# Patient Record
Sex: MALE | Race: WHITE | NOT HISPANIC OR LATINO | Employment: OTHER | URBAN - METROPOLITAN AREA
[De-identification: names, ages, dates, MRNs, and addresses within clinical notes are randomized per-mention and may not be internally consistent; named-entity substitution may affect disease eponyms.]

---

## 2017-01-01 ENCOUNTER — GENERIC CONVERSION - ENCOUNTER (OUTPATIENT)
Dept: OTHER | Facility: OTHER | Age: 82
End: 2017-01-01

## 2017-01-01 ENCOUNTER — ALLSCRIPTS OFFICE VISIT (OUTPATIENT)
Dept: OTHER | Facility: OTHER | Age: 82
End: 2017-01-01

## 2017-01-01 ENCOUNTER — APPOINTMENT (OUTPATIENT)
Dept: PHYSICAL THERAPY | Facility: REHABILITATION | Age: 82
End: 2017-01-01
Payer: COMMERCIAL

## 2017-01-01 ENCOUNTER — HOSPITAL ENCOUNTER (OUTPATIENT)
Dept: ULTRASOUND IMAGING | Facility: MEDICAL CENTER | Age: 82
Discharge: HOME | End: 2017-12-21
Payer: COMMERCIAL

## 2017-01-01 ENCOUNTER — LAB CONVERSION - ENCOUNTER (OUTPATIENT)
Dept: OTHER | Facility: OTHER | Age: 82
End: 2017-01-01

## 2017-01-01 ENCOUNTER — GENERIC CONVERSION - ENCOUNTER (OUTPATIENT)
Dept: FAMILY MEDICINE CLINIC | Facility: CLINIC | Age: 82
End: 2017-01-01

## 2017-01-01 ENCOUNTER — TRANSCRIBE ORDERS (OUTPATIENT)
Dept: ADMINISTRATIVE | Facility: HOSPITAL | Age: 82
End: 2017-01-01

## 2017-01-01 DIAGNOSIS — R33.9 INCOMPLETE BLADDER EMPTYING: ICD-10-CM

## 2017-01-01 DIAGNOSIS — I83.009 VARICOSE VEINS OF LOWER EXTREMITY WITH ULCER (HCC): ICD-10-CM

## 2017-01-01 DIAGNOSIS — G62.9 POLYNEUROPATHY: ICD-10-CM

## 2017-01-01 DIAGNOSIS — N52.9 MALE ERECTILE DYSFUNCTION: ICD-10-CM

## 2017-01-01 DIAGNOSIS — R33.9 RETENTION OF URINE: ICD-10-CM

## 2017-01-01 DIAGNOSIS — N40.0 BENIGN PROSTATIC HYPERPLASIA WITHOUT LOWER URINARY TRACT SYMPTOMS: ICD-10-CM

## 2017-01-01 DIAGNOSIS — R33.9 INCOMPLETE BLADDER EMPTYING: Primary | ICD-10-CM

## 2017-01-01 DIAGNOSIS — I73.9 PERIPHERAL VASCULAR DISEASE (HCC): ICD-10-CM

## 2017-01-01 DIAGNOSIS — R26.89 OTHER ABNORMALITIES OF GAIT AND MOBILITY: ICD-10-CM

## 2017-01-01 DIAGNOSIS — R31.9 HEMATURIA: ICD-10-CM

## 2017-01-01 DIAGNOSIS — R31.0 GROSS HEMATURIA: ICD-10-CM

## 2017-01-01 DIAGNOSIS — R73.9 HYPERGLYCEMIA: ICD-10-CM

## 2017-01-01 DIAGNOSIS — Z12.5 ENCOUNTER FOR SCREENING FOR MALIGNANT NEOPLASM OF PROSTATE: ICD-10-CM

## 2017-01-01 DIAGNOSIS — L97.909 VARICOSE VEINS OF LOWER EXTREMITY WITH ULCER (HCC): ICD-10-CM

## 2017-01-01 DIAGNOSIS — D03.9 MELANOMA IN SITU (HCC): ICD-10-CM

## 2017-01-01 LAB
A/G RATIO (HISTORICAL): 1.6 (CALC) (ref 1–2.5)
ALBUMIN SERPL BCP-MCNC: 4.4 G/DL (ref 3.6–5.1)
ALP SERPL-CCNC: 67 U/L (ref 40–115)
ALT SERPL W P-5'-P-CCNC: 20 U/L (ref 9–46)
AST SERPL W P-5'-P-CCNC: 25 U/L (ref 10–35)
BASOPHILS # BLD AUTO: 0.3 %
BASOPHILS # BLD AUTO: 11 CELLS/UL (ref 0–200)
BILIRUB SERPL-MCNC: 1 MG/DL (ref 0.2–1.2)
BUN SERPL-MCNC: 12 MG/DL (ref 7–25)
BUN/CREA RATIO (HISTORICAL): NORMAL (CALC) (ref 6–22)
CALCIUM SERPL-MCNC: 9.7 MG/DL (ref 8.6–10.3)
CHLORIDE SERPL-SCNC: 104 MMOL/L (ref 98–110)
CHOLEST SERPL-MCNC: 154 MG/DL (ref 125–200)
CHOLEST SERPL-MCNC: 174 MG/DL
CHOLEST/HDLC SERPL: 4.1 (CALC)
CHOLEST/HDLC SERPL: 5 (CALC)
CLARITY UR: NORMAL
CO2 SERPL-SCNC: 26 MMOL/L (ref 20–31)
COLOR UR: YELLOW
CREAT SERPL-MCNC: 0.99 MG/DL (ref 0.7–1.11)
DEPRECATED RDW RBC AUTO: 13.1 % (ref 11–15)
EGFR AFRICAN AMERICAN (HISTORICAL): 80 ML/MIN/1.73M2
EGFR-AMERICAN CALC (HISTORICAL): 69 ML/MIN/1.73M2
EOSINOPHIL # BLD AUTO: 148 CELLS/UL (ref 15–500)
EOSINOPHIL # BLD AUTO: 3.9 %
GAMMA GLOBULIN (HISTORICAL): 2.7 G/DL (CALC) (ref 1.9–3.7)
GLUCOSE (HISTORICAL): 95 MG/DL (ref 65–99)
GLUCOSE (HISTORICAL): NORMAL
HBA1C MFR BLD HPLC: 5.4 % OF TOTAL HGB
HBA1C MFR BLD HPLC: 5.5 % OF TOTAL HGB
HCT VFR BLD AUTO: 45.8 % (ref 38.5–50)
HDLC SERPL-MCNC: 35 MG/DL
HDLC SERPL-MCNC: 38 MG/DL
HGB BLD-MCNC: 15.5 G/DL (ref 13.2–17.1)
HGB UR QL STRIP.AUTO: NORMAL
KETONES UR STRIP-MCNC: NORMAL MG/DL
LDL CHOLESTEROL (HISTORICAL): 102 MG/DL (CALC)
LDL CHOLESTEROL (HISTORICAL): 117 MG/DL (CALC)
LEUKOCYTE ESTERASE UR QL STRIP: NORMAL
LYMPHOCYTES # BLD AUTO: 1554 CELLS/UL (ref 850–3900)
LYMPHOCYTES # BLD AUTO: 40.9 %
MCH RBC QN AUTO: 31.8 PG (ref 27–33)
MCHC RBC AUTO-ENTMCNC: 33.8 G/DL (ref 32–36)
MCV RBC AUTO: 93.9 FL (ref 80–100)
MONOCYTES # BLD AUTO: 426 CELLS/UL (ref 200–950)
MONOCYTES (HISTORICAL): 11.2 %
NEUTROPHILS # BLD AUTO: 1661 CELLS/UL (ref 1500–7800)
NEUTROPHILS # BLD AUTO: 43.7 %
NITRITE UR QL STRIP: NORMAL
NON-HDL-CHOL (CHOL-HDL) (HISTORICAL): 116 MG/DL (CALC)
NON-HDL-CHOL (CHOL-HDL) (HISTORICAL): 139 MG/DL (CALC)
PH UR STRIP.AUTO: 6 [PH]
PLATELET # BLD AUTO: 154 THOUSAND/UL (ref 140–400)
PMV BLD AUTO: 12 FL (ref 7.5–12.5)
POTASSIUM SERPL-SCNC: 4.3 MMOL/L (ref 3.5–5.3)
PROSTATE SPECIFIC ANTIGEN TOTAL (HISTORICAL): 1.9 NG/ML
PROT UR STRIP-MCNC: NORMAL MG/DL
RBC # BLD AUTO: 4.88 MILLION/UL (ref 4.2–5.8)
SODIUM SERPL-SCNC: 139 MMOL/L (ref 135–146)
SP GR UR STRIP.AUTO: 1.01
TOTAL PROTEIN (HISTORICAL): 7.1 G/DL (ref 6.1–8.1)
TRIGL SERPL-MCNC: 113 MG/DL
TRIGL SERPL-MCNC: 70 MG/DL
TSH SERPL DL<=0.05 MIU/L-ACNC: 1.73 MIU/L (ref 0.4–4.5)
TSH SERPL DL<=0.05 MIU/L-ACNC: 2.34 MIU/L (ref 0.4–4.5)
WBC # BLD AUTO: 3.8 THOUSAND/UL (ref 3.8–10.8)

## 2017-01-01 PROCEDURE — 97530 THERAPEUTIC ACTIVITIES: CPT

## 2017-01-01 PROCEDURE — 97110 THERAPEUTIC EXERCISES: CPT

## 2017-01-01 PROCEDURE — G8990 OTHER PT/OT CURRENT STATUS: HCPCS

## 2017-01-01 PROCEDURE — 97112 NEUROMUSCULAR REEDUCATION: CPT

## 2017-01-01 PROCEDURE — G8991 OTHER PT/OT GOAL STATUS: HCPCS

## 2017-01-01 PROCEDURE — 97140 MANUAL THERAPY 1/> REGIONS: CPT

## 2017-01-01 PROCEDURE — 51798 US URINE CAPACITY MEASURE: CPT

## 2017-01-01 PROCEDURE — 97161 PT EVAL LOW COMPLEX 20 MIN: CPT

## 2017-01-05 ENCOUNTER — GENERIC CONVERSION - ENCOUNTER (OUTPATIENT)
Dept: OTHER | Facility: OTHER | Age: 82
End: 2017-01-05

## 2017-01-24 ENCOUNTER — GENERIC CONVERSION - ENCOUNTER (OUTPATIENT)
Dept: OTHER | Facility: OTHER | Age: 82
End: 2017-01-24

## 2017-01-24 ENCOUNTER — APPOINTMENT (OUTPATIENT)
Dept: PHYSICAL THERAPY | Facility: REHABILITATION | Age: 82
End: 2017-01-24
Payer: COMMERCIAL

## 2017-01-24 PROCEDURE — 97161 PT EVAL LOW COMPLEX 20 MIN: CPT

## 2017-01-27 ENCOUNTER — APPOINTMENT (OUTPATIENT)
Dept: PHYSICAL THERAPY | Facility: REHABILITATION | Age: 82
End: 2017-01-27
Payer: COMMERCIAL

## 2017-01-27 PROCEDURE — 97112 NEUROMUSCULAR REEDUCATION: CPT

## 2017-01-31 ENCOUNTER — APPOINTMENT (OUTPATIENT)
Dept: PHYSICAL THERAPY | Facility: REHABILITATION | Age: 82
End: 2017-01-31
Payer: COMMERCIAL

## 2017-01-31 PROCEDURE — 97112 NEUROMUSCULAR REEDUCATION: CPT

## 2017-02-02 ENCOUNTER — APPOINTMENT (OUTPATIENT)
Dept: PHYSICAL THERAPY | Facility: REHABILITATION | Age: 82
End: 2017-02-02
Payer: COMMERCIAL

## 2017-02-02 PROCEDURE — 97112 NEUROMUSCULAR REEDUCATION: CPT

## 2017-02-03 DIAGNOSIS — R31.9 HEMATURIA: ICD-10-CM

## 2017-02-07 ENCOUNTER — APPOINTMENT (OUTPATIENT)
Dept: PHYSICAL THERAPY | Facility: REHABILITATION | Age: 82
End: 2017-02-07
Payer: COMMERCIAL

## 2017-02-07 PROCEDURE — 97112 NEUROMUSCULAR REEDUCATION: CPT

## 2017-02-09 ENCOUNTER — APPOINTMENT (OUTPATIENT)
Dept: PHYSICAL THERAPY | Facility: REHABILITATION | Age: 82
End: 2017-02-09
Payer: COMMERCIAL

## 2017-02-09 PROCEDURE — 97112 NEUROMUSCULAR REEDUCATION: CPT

## 2017-02-14 ENCOUNTER — APPOINTMENT (OUTPATIENT)
Dept: PHYSICAL THERAPY | Facility: REHABILITATION | Age: 82
End: 2017-02-14
Payer: COMMERCIAL

## 2017-02-14 PROCEDURE — 97112 NEUROMUSCULAR REEDUCATION: CPT

## 2017-02-16 ENCOUNTER — APPOINTMENT (OUTPATIENT)
Dept: PHYSICAL THERAPY | Facility: REHABILITATION | Age: 82
End: 2017-02-16
Payer: COMMERCIAL

## 2017-02-16 ENCOUNTER — ALLSCRIPTS OFFICE VISIT (OUTPATIENT)
Dept: OTHER | Facility: OTHER | Age: 82
End: 2017-02-16

## 2017-02-16 PROCEDURE — 97112 NEUROMUSCULAR REEDUCATION: CPT

## 2017-02-21 ENCOUNTER — APPOINTMENT (OUTPATIENT)
Dept: PHYSICAL THERAPY | Facility: REHABILITATION | Age: 82
End: 2017-02-21
Payer: COMMERCIAL

## 2017-02-21 PROCEDURE — 97112 NEUROMUSCULAR REEDUCATION: CPT

## 2017-02-23 ENCOUNTER — APPOINTMENT (OUTPATIENT)
Dept: PHYSICAL THERAPY | Facility: REHABILITATION | Age: 82
End: 2017-02-23
Payer: COMMERCIAL

## 2017-11-03 NOTE — PROGRESS NOTES
Assessment  1  Peripheral arterial disease (443 9) (I73 9)   2  Melanoma in situ (172 9) (D03 9)   3  Hyperglycemia (790 29) (R73 9)   4  Benign enlargement of prostate (600 00) (N40 0)   5  Never a smoker   6  Balance problems (781 99) (R26 89)   7  Peripheral neuropathy (356 9) (G62 9)    Plan  Balance problems, Peripheral neuropathy    · *1 - [facility]  Balance Training  Status: Active   Requested for: 19FAI3924  Care Summary provided  : Yes  Health Maintenance    · *VB - Urinary Incontinence Screen (Dx Z13 89 Screen for UI); Status:Complete -  Retrospective By Protocol Authorization;   Done: 63JAV0255 09:13AM    Discussion/Summary    1  Loss of balance-this is likely related to his peripheral neuropathy and would recommend evaluation by [facility] physical therapy, balance Center  Peripheral neuropathy-idiopathic  He has not had any problems with alcohol abuse, glucose abnormality  Peripheral arterial disease-presently stable per vascular specialist, no medication changes  Melanoma-stable per Dermatology  maintenance-flu vaccine and Prevnar 13 given today  Labs were ordered for 6 months  Chief Complaint  Follow up to chronic conditions and review bw off balance frequently  History of Present Illness  This is an 80year-old gentleman that presents to the office for follow-up of chronic conditions and recent blood test  He has been feeling well for the most part but he does mention some ongoing problems with loss of balance and feels that it has been worsening over time  He is not sure if this is related to his history of peripheral neuropathy or if it may be something to do with his balance Center  He does not have any dizziness however and just sitting at rest does not feel any sense of spinning  He does not have trouble with head movements  He just states that when he is walking he has a poor sense of his positioning   He also has a history of melanoma, basal cell cancer, and squamous cell cancer in continues to follow with Dermatology  Review of Systems    Constitutional: No fever or chills, feels well, no tiredness, no recent weight gain or weight loss  Eyes: No complaints of eye pain, no red eyes, no discharge from eyes, no itchy eyes  ENT: no complaints of earache, no hearing loss, no nosebleeds, no nasal discharge, no sore throat, no hoarseness  Cardiovascular: No complaints of slow heart rate, no fast heart rate, no chest pain, no palpitations, no leg claudication, no lower extremity  Respiratory: No complaints of shortness of breath, no wheezing, no cough, no SOB on exertion, no orthopnea or PND  Gastrointestinal: No complaints of abdominal pain, no constipation, no nausea or vomiting, no diarrhea or bloody stools  Genitourinary: No complaints of dysuria, no incontinence, no hesitancy, no nocturia, no genital lesion, no testicular pain  Musculoskeletal: No complaints of arthralgia, no myalgias, no joint swelling or stiffness, no limb pain or swelling  Integumentary: No complaints of skin rash or skin lesions, no itching, no skin wound, no dry skin  Neurological: No compliants of headache, no confusion, no convulsions, no numbness or tingling, no dizziness or fainting, no limb weakness, no difficulty walking  Hematologic/Lymphatic: No complaints of swollen glands, no swollen glands in the neck, does not bleed easily, no easy bruising  Active Problems  1  Acute conjunctivitis (372 00) (H10 30)   2  Acute pain of right foot (729 5) (M79 671)   3  Atopic dermatitis (691 8) (L20 9)   4  Basal cell carcinoma of skin (173 91) (C44 91)   5  Basal cell carcinoma of upper extremity (173 61) (C44 611)   6  Benign enlargement of prostate (600 00) (N40 0)   7  Blood pressure check (V81 1) (Z01 30)   8  Contusion of hand, right (923 20) (S60 221A)   9  Contusion of right shoulder (923 00) (S40 011A)   10   Contusion of right wrist (923 21) (K48 098Q) 11  Diverticulosis (562 10) (K57 90)   12  Encounter for prostate cancer screening (V76 44) (Z12 5)   13  Epistaxis (784 7) (R04 0)   14  Erectile dysfunction (607 84) (N52 9)   15  Hematuria (599 70) (R31 9)   16  Hematuria, gross (599 71) (R31 0)   17  Hyperglycemia (790 29) (R73 9)   18  Laceration of oral cavity, subsequent encounter (V58 89,873 60) (S01 512D)   19  Melanoma in situ (172 9) (D03 9)   20  Multiple contusions (924 8) (T07  XXXA)   21  Neck muscle spasm (728 85) (M62 838)   22  Neck pain (723 1) (M54 2)   23  Need for immunization against influenza (V04 81) (Z23)   24  Peripheral arterial disease (443 9) (I73 9)   25  Peripheral neuropathy (356 9) (G62 9)   26  Peripheral vascular disease (443 9) (I73 9)   27  Postoperative state (V45 89) (Z98 890)   28  Preop cardiovascular exam (V72 81) (Z01 810)   29  Right rotator cuff tear (840 4) (M75 101)   30  Rotator cuff tendinitis (726 10) (M75 80)   31  Sepsis secondary to UTI (038 9,995 91,599 0) (A41 9,N39 0)   32  Skin ulcer (707 9) (L98 499)   33  Squamous Cell Carcinoma Of The Skin (173 92)   34  Tinea cruris (110 3) (B35 6)   35  Triceps strain, left, initial encounter (840 8) (S46 312A)   36  Urinary retention (788 20) (R33 9)   37  UTI (urinary tract infection) (599 0) (N39 0)   38  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)   39  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)   40  Vasovagal syncope (780 2) (R55)   41  Visual disturbances (368 9) (H53 9)    Past Medical History  1  History of Abscess of face (682 0) (L02 01)   2  History of Herpes zoster (053 9) (B02 9)    The active problems and past medical history were reviewed and updated today  Surgical History  1  History of Complete Colonoscopy   2  History of Diagnostic Cystoscopy   3  History of Endovenous Ablation Of Incompetent Vein Laser   4  History of Hernia Repair   5   History of Stab Phlebectomy Of Varicose Veins 10-20 Stab Incisions    Family History  Mother    1  Family history of Coronary Artery Disease (V17 49)   2  Family history of Family Health Status Of Mother -    3  Family history of Stroke Syndrome (V17 1)  Father    4  Family history of Coronary Artery Disease (V17 49)   5  Family history of Family Health Status Of Father -    10  Family history of Heart Disease (V17 49)    Social History   · Consumes alcohol occasionally (V49 89) (Z78 9)   · Marital History - Currently    · Never a smoker   · No secondhand smoke exposure (V49 89) (Z78 9)   · Occupation: Retired    Current Meds   1  Centrum Silver Oral Tablet; TAKE 1 TABLET DAILY; Therapy: 57Oog3176 to Recorded   2  Fish Oil 1000 MG Oral Capsule; TAKE 1 CAPSULE DAILY IN THE MORNING Recorded   3  Vitamin C 250 MG Oral Tablet; Take 1 tablet daily Recorded    The medication list was reviewed and updated today  Allergies  1  IVP Dye  Denied    2  Bactrim TABS    Vitals  Vital Signs    Recorded: 88FQN4696 09:08AM   Heart Rate 76   Systolic 614   Diastolic 80   Height 5 ft 10 in   Weight 176 lb    BMI Calculated 25 25   BSA Calculated 1 98     Physical Exam    Constitutional   General appearance: No acute distress, well appearing and well nourished  Eyes   Conjunctiva and lids: No swelling, erythema, or discharge  Pupils and irises: Equal, round and reactive to light  Ears, Nose, Mouth, and Throat   External inspection of ears and nose: Normal     Otoscopic examination: Tympanic membrance translucent with normal light reflex  Canals patent without erythema  Nasal mucosa, septum, and turbinates: Normal without edema or erythema  Oropharynx: Normal with no erythema, edema, exudate or lesions  Pulmonary   Respiratory effort: No increased work of breathing or signs of respiratory distress  Auscultation of lungs: Clear to auscultation, equal breath sounds bilaterally, no wheezes, no rales, no rhonci      Cardiovascular   Auscultation of heart: Normal rate and rhythm, normal S1 and S2, without murmurs  Examination of extremities for edema and/or varicosities: Normal     Abdomen   Abdomen: Non-tender, no masses  Liver and spleen: No hepatomegaly or splenomegaly  Musculoskeletal   Gait and station: Normal     Digits and nails: Normal without clubbing or cyanosis  Neurologic   Reflexes: 2+ and symmetric  Psychiatric   Orientation to person, place and time: Normal     Mood and affect: Normal          Results/Data  *VB - Urinary Incontinence Screen (Dx Z13 89 Screen for UI) 61CMG3478 09:13AM Vineet Rogers     Test Name Result Flag Reference   Urinary Incontinence Assessment [id number]       PHQ-2 Adult Depression Screening 49LFD5145 09:12AM User, Ahs     Test Name Result Flag Reference   PHQ-2 Adult Depression Score 0     Over the last two weeks, how often have you been bothered by any of the following problems? Little interest or pleasure in doing things: Not at all - 0  Feeling down, depressed, or hopeless: Not at all - 0   PHQ-2 Adult Depression Screening Negative       Falls Risk Assessment (Dx Z13 89 Screen for Neurologic Disorder) 48UJK6155 09:12AM User, Ahs     Test Name Result Flag Reference   Falls Risk      No falls in the past year       Future Appointments    Date/Time Provider Specialty Site   11/16/2017 09:00 AM CHERRY Stein   [facility] PANIAGUA     Signatures   Electronically signed by : Adi Baugh, [facility]; Nov 2 2017  9:40AM EST                       (Author)    Electronically signed by : Nia Gann DO; Nov 2 2017  9:43AM EST

## 2017-11-17 NOTE — PROGRESS NOTES
Assessment    1  Enlarged prostate without lower urinary tract symptoms (luts) (600 00) (N40 0)   2  Urinary retention (788 20) (R33 9)    Plan  Contusion of hand, right    · Urine Dip Non-Automated- POC; Status:Complete - Retrospective By ProtocolAuthorization;   Done: 43MXC8655 09:12AM   Performed: In Office; Due:24Juh3747; Last Updated Dianne Malcolm; 11/16/2017 9:15:57 AM;Ordered;of hand, right; Ordered By:Marin Garnica; Hematuria, Hematuria, gross, Urinary retention    · US RETROPERITONEAL COMPLETE; Status:Active - Retrospective By ProtocolAuthorization; Requested for:63Ytb6620 10:45AM;    Perform:[facility] Radiology; Order Comments:with pvr; SKQ:33AGC1157; Last Updated By:Danni Payne; 11/16/2017 9:44:02 AM;Ordered; Hematuria, gross, Urinary retention; Ordered By:Marin Garnica;  Urinary retention    · Follow-up visit in 6 weeks Evaluation and Treatment  Follow-up  Status: Complete  Done:94Ynd1332   Ordered;Urinary retention; Ordered By: Janessa Steiner Performed:  Due: 65HYB2560; Last Updated By: Elena Cunningham; 11/16/2017 9:44:25 AM    Discussion/Summary  Discussion Summary:   We discussed that he continues to retain urine  He is comfortable however without symptoms  We discussed and I recommend double voiding  He is willing to try this  I also recommend additional cystoscopy due to his intermittent hematuria  He is not interested in this at this point  He does agree to at least an ultrasound of the urinary tract to evaluate for any gross abnormalities  Will also been opportunity to recheck his postvoid residual the  Follow-up to discuss results  Goals and Barriers: The patient has the current Goals: The patent has the current Barriers:   Patient's Capacity to Self-Care: Patient is able to Self-Care        Chief Complaint  Chief Complaint Free Text Note Form: Patient presents with urinary retention, BPH, PVR      History of Present Illness  HPI: Patient has good urinary stream without at any episodes of urinary frequency or urgency  AUA symptom score 1  He does report intermittent hematuria about once every 3 months  He denies constipation  His last cystoscopy was August 2016 which revealed a trabeculated bladder with a minimal bladder outlet obstruction  is not double voiding but does not feel that he is retaining urine  Review of Systems  Complete-Male Urology:  Constitutional: No fever or chills, feels well, no tiredness, no recent weight gain or weight loss  Respiratory: No complaints of shortness of breath, no wheezing, no cough, no SOB on exertion, no orthopnea or PND  Cardiovascular: No complaints of slow heart rate, no fast heart rate, no chest pain, no palpitations, no leg claudication, no lower extremity  Gastrointestinal: No complaints of abdominal pain, no constipation, no nausea or vomiting, no diarrhea or bloody stools  Genitourinary: Empty sensation-- and-- stream quality fair, but-- no dysuria,-- no urinary hesitancy,-- no incontinence,-- no nocturia-- and-- no feelings of urinary urgency--   The patient presents with complaints of hematuria (recent episode of blood in urine about 6 weeks ago, has since resolved  )  Musculoskeletal: No complaints of arthralgia, no myalgias, no joint swelling or stiffness, no limb pain or swelling  Integumentary: No complaints of skin rash or skin lesions, no itching, no skin wound, no dry skin  Hematologic/Lymphatic: No complaints of swollen glands, no swollen glands in the neck, does not bleed easily, no easy bruising  Neurological: No compliants of headache, no confusion, no convulsions, no numbness or tingling, no dizziness or fainting, no limb weakness, no difficulty walking  Active Problems  1  Acute conjunctivitis (372 00) (H10 30)   2  Acute pain of right foot (729 5) (M79 671)   3  Atopic dermatitis (691 8) (L20 9)   4  Balance problems (781 99) (R26 89)   5  Basal cell carcinoma of skin (173 91) (C44 91)   6   Basal cell carcinoma of upper extremity (173 61) (C44 611)   7  Blood pressure check (V81 1) (Z01 30)   8  Contusion of hand, right (923 20) (S60 221A)   9  Contusion of right shoulder (923 00) (S40 011A)   10  Contusion of right wrist (923 21) (S60 211A)   11  Diverticulosis (562 10) (K57 90)   12  Encounter for prostate cancer screening (V76 44) (Z12 5)   13  Enlarged prostate without lower urinary tract symptoms (luts) (600 00) (N40 0)   14  Epistaxis (784 7) (R04 0)   15  Erectile dysfunction (607 84) (N52 9)   16  Hematuria (599 70) (R31 9)   17  Hematuria, gross (599 71) (R31 0)   18  Hyperglycemia (790 29) (R73 9)   19  Laceration of oral cavity, subsequent encounter (V58 89,873 60) (S01 512D)   20  Melanoma in situ (172 9) (D03 9)   21  Multiple contusions (924 8) (T07  XXXA)   22  Neck muscle spasm (728 85) (M62 838)   23  Neck pain (723 1) (M54 2)   24  Need for immunization against influenza (V04 81) (Z23)   25  Need for pneumococcal vaccination (V03 82) (Z23)   26  Peripheral arterial disease (443 9) (I73 9)   27  Peripheral neuropathy (356 9) (G62 9)   28  Peripheral vascular disease (443 9) (I73 9)   29  Postoperative state (V45 89) (Z98 890)   30  Preop cardiovascular exam (V72 81) (Z01 810)   31  Right rotator cuff tear (840 4) (M75 101)   32  Rotator cuff tendinitis (726 10) (M75 80)   33  Sepsis secondary to UTI (038 9,995 91,599 0) (A41 9,N39 0)   34  Skin ulcer (707 9) (L98 499)   35  Squamous Cell Carcinoma Of The Skin (173 92)   36  Tinea cruris (110 3) (B35 6)   37  Triceps strain, left, initial encounter (840 8) (S46 312A)   38  Urinary retention (788 20) (R33 9)   39  UTI (urinary tract infection) (599 0) (N39 0)   40  Varicose veins of left lower extremity with other complications (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 0) (B55 216)   41  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)   42  Vasovagal syncope (780 2) (R55)   43  Visual disturbances (368 9) (H53 9)    Past Medical History  1  History of Abscess of face (682 0) (L02 01)   2  History of Herpes zoster (053 9) (B02 9)    Surgical History  1  History of Complete Colonoscopy   2  History of Diagnostic Cystoscopy   3  History of Endovenous Ablation Of Incompetent Vein Laser   4  History of Hernia Repair   5  History of Stab Phlebectomy Of Varicose Veins 10-20 Stab Incisions    Family History  Mother    1  Family history of Coronary Artery Disease (V17 49)   2  Family history of Family Health Status Of Mother -    3  Family history of Stroke Syndrome (V17 1)  Father    4  Family history of Coronary Artery Disease (V17 49)   5  Family history of Family Health Status Of Father -    10  Family history of Heart Disease (V17 49)    Social History     · Consumes alcohol occasionally (V49 89) (Z78 9)   · Marital History - Currently    · Never a smoker   · No secondhand smoke exposure (V49 89) (Z78 9)   · Occupation: Retired    Current Meds   1  Centrum Silver Oral Tablet; TAKE 1 TABLET DAILY; Therapy: 80Pvd4271 to Recorded   2  Fish Oil 1000 MG Oral Capsule; TAKE 1 CAPSULE DAILY IN THE MORNING Recorded   3  Vitamin C 250 MG Oral Tablet; Take 1 tablet daily Recorded  Medication List Reviewed: The medication list was reviewed and updated today  Allergies  1  IVP Dye  Denied    2  Bactrim TABS    Vitals  Vital Signs    Recorded: 17DGO4059 09:09AM   Heart Rate 84   Systolic 548   Diastolic 82   Height 5 ft 10 in   Weight 179 lb    BMI Calculated 25 68   BSA Calculated 1 99       Physical Exam   Constitutional  General appearance: No acute distress, well appearing and well nourished  Pulmonary  Respiratory effort: No increased work of breathing or signs of respiratory distress  Abdomen  Abdomen: Non-tender, no masses  Genitourinary Declined prostate examination    Musculoskeletal  Gait and station: Normal        Results/Data  AUA Symptom Score 42GAE3020 09:15AM User, Ahs     Test Name Result Flag Reference   AUA Symptom Score (for prostate disease) 1       Incomplete emptying: Not at all (0) Frequency: Not at all (0) Intermittency: Not at all (0) Urgency: Not at all (0) Weak-stream: Not at all (0) Straining: Not at all (0) Nocturia: Less than 1 time in 5 (1)   AUA Symptom Score (for prostate disease) - Quality of Life Due to Urinary Symptoms Mostly satisfied     AUA Symptom Score (for prostate disease) - Score Category Mild       Urine Dip Non-Automated- POC 37PJR4810 09:12AM Bernardino Mcleod     Test Name Result Flag Reference   Color Yellow       Clarity Transparent     Leukocytes -     Nitrite -     Blood -     Protein -     Ph 6 0     Specific Gravity 1 015     Ketone -     Glucose -           Procedure   Procedure: Bladder Ultrasound Post Void Residual    Equipment And Procedure: The patient voided did not measure ml  U/S Findings: U/S PVR = 191 72 ml        Future Appointments    Date/Time Provider Specialty Site   12/29/2017 11:00 AM [facility]   05/10/2018 09:00 AM Jolynn Kaur, [facility]       Signatures   Electronically signed by : CHERRY Hagen ; Nov 16 2017 10:42AM EST                       (Author)

## 2017-12-30 NOTE — PROGRESS NOTES
Assessment   1  Enlarged prostate without lower urinary tract symptoms (luts) (600 00) (N40 0)   2  Urinary retention (788 20) (R33 9)   3  Hematuria, gross (599 71) (R31 0)    Plan   Enlarged prostate without lower urinary tract symptoms (luts)    · Cystourethroscopy - POC; Status:Active - Perform Order; Requested for:[id number];    Perform: In Office; [id number]; Ordered;For:Enlarged prostate without lower urinary tract symptoms (luts); Ordered By:Isabella Vides;    Discussion/Summary   Discussion Summary:    27-year-old male managed by Dr Dasha Valdez   BPH Elevated PVRs with a bladder diverticula  Gross hematuria   for continued gross hematuria the patient is doing well from a lower urinary tract standpoint but discussed that his postvoid residual obtained on his ultrasound was elevated  He deferred catheterizations today but is agreeable to proceeding with cystoscopy  Will follow-up with this in the near future for this  Did not start him on tamsulosin as he was on this in the past with little effect and his cystoscopy in August 2016 revealed no prostatic obstruction  The patient will continue to double void  He understands and agrees to this treatment plan  All questions and concerns have been addressed and answered  Chief Complaint   Chief Complaint Free Text Note Form: pt here for 6 week follow up for BPH; Retention      History of Present Illness   HPI: Ashutosh Mark is an 27-year-old male here for follow-up evaluation of his BPH, elevated PVRs, and gross hematuria  His last cystoscopy was August 2016 and revealed no prostatic obstruction but a bladder diverticula  His postvoid residual obtained prior to visit was 374mL  No hydronephrosis was seen  Lower urinary tract wise the patient is doing well in admits to only nocturia 1 time per night  AUA symptom score is 2 = mild and quality of life is 1 = pleased        Review of Systems   Complete-Male Urology:      Constitutional: No fever or chills, feels well, no tiredness, no recent weight gain or weight loss  Respiratory: No complaints of shortness of breath, no wheezing, no cough, no SOB on exertion, no orthopnea or PND  Cardiovascular: No complaints of slow heart rate, no fast heart rate, no chest pain, no palpitations, no leg claudication, no lower extremity  Gastrointestinal: No complaints of abdominal pain, no constipation, no nausea or vomiting, no diarrhea or bloody stools  Genitourinary: Empty sensation-- and-- stream quality good, but-- no dysuria,-- no urinary hesitancy,-- no hematuria,-- no incontinence,-- no nocturia-- and-- no feelings of urinary urgency  Musculoskeletal: No complaints of arthralgia, no myalgias, no joint swelling or stiffness, no limb pain or swelling  Integumentary: No complaints of skin rash or skin lesions, no itching, no skin wound, no dry skin  Hematologic/Lymphatic: No complaints of swollen glands, no swollen glands in the neck, does not bleed easily, no easy bruising  Neurological: No compliants of headache, no confusion, no convulsions, no numbness or tingling, no dizziness or fainting, no limb weakness, no difficulty walking  ROS Reviewed:    ROS reviewed  Active Problems   1  Acute conjunctivitis (372 00) (H10 30)   2  Acute pain of right foot (729 5) (M79 671)   3  Atopic dermatitis (691 8) (L20 9)   4  Balance problems (781 99) (R26 89)   5  Basal cell carcinoma of skin (173 91) (C44 91)   6  Basal cell carcinoma of upper extremity (173 61) (C44 611)   7  Blood pressure check (V81 1) (Z01 30)   8  Contusion of hand, right (923 20) (S60 221A)   9  Contusion of right shoulder (923 00) (S40 011A)   10  Contusion of right wrist (923 21) (S60 211A)   11  Diverticulosis (562 10) (K57 90)   12  Encounter for prostate cancer screening (V76 44) (Z12 5)   13  Enlarged prostate without lower urinary tract symptoms (luts) (600 00) (N40 0)   14  Epistaxis (784 7) (R04 0)   15   Erectile dysfunction (607 84) (N52 9)   16  Hematuria (599 70) (R31 9)   17  Hematuria, gross (599 71) (R31 0)   18  Hyperglycemia (790 29) (R73 9)   19  Laceration of oral cavity, subsequent encounter (V58 89,873 60) (S01 512D)   20  Melanoma in situ (172 9) (D03 9)   21  Multiple contusions (924 8) (T07  XXXA)   22  Neck muscle spasm (728 85) (M62 838)   23  Neck pain (723 1) (M54 2)   24  Need for immunization against influenza (V04 81) (Z23)   25  Need for pneumococcal vaccination (V03 82) (Z23)   26  Peripheral arterial disease (443 9) (I73 9)   27  Peripheral neuropathy (356 9) (G62 9)   28  Peripheral vascular disease (443 9) (I73 9)   29  Postoperative state (V45 89) (Z98 890)   30  Preop cardiovascular exam (V72 81) (Z01 810)   31  Right rotator cuff tear (840 4) (M75 101)   32  Rotator cuff tendinitis (726 10) (M75 80)   33  Sepsis secondary to UTI (038 9,995 91,599 0) (A41 9,N39 0)   34  Skin ulcer (707 9) (L98 499)   35  Squamous Cell Carcinoma Of The Skin (173 92)   36  Tinea cruris (110 3) (B35 6)   37  Triceps strain, left, initial encounter (840 8) (S46 312A)   38  Urinary retention (788 20) (R33 9)   39  UTI (urinary tract infection) (599 0) (N39 0)   40  Varicose veins of left lower extremity with other complications (460 6) (N23 880)   41  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)   42  Vasovagal syncope (780 2) (R55)   43  Visual disturbances (368 9) (H53 9)    Past Medical History   1  History of Abscess of face (682 0) (L02 01)   2  History of Herpes zoster (053 9) (B02 9)  Active Problems And Past Medical History Reviewed: The active problems and past medical history were reviewed and updated today  Surgical History   1  History of Complete Colonoscopy   2  History of Diagnostic Cystoscopy   3  History of Endovenous Ablation Of Incompetent Vein Laser   4  History of Hernia Repair   5  History of Stab Phlebectomy Of Varicose Veins 10-20 Stab Incisions  Surgical History Reviewed:     The surgical history was reviewed and updated today  Family History   Mother    1  Family history of Coronary Artery Disease (V17 49)   2  Family history of Family Health Status Of Mother -    3  Family history of Stroke Syndrome (V17 1)  Father    4  Family history of Coronary Artery Disease (V17 49)   5  Family history of Family Health Status Of Father -    10  Family history of Heart Disease (V17 49)  Family History Reviewed: The family history was reviewed and updated today  Social History    · Consumes alcohol occasionally (V49 89) (Z78 9)   · Marital History - Currently    · Never a smoker   · No secondhand smoke exposure (V49 89) (Z78 9)   · Occupation: Retired  Social History Reviewed: The social history was reviewed and updated today  The social history was reviewed and is unchanged  Current Meds    1  Centrum Silver Oral Tablet; TAKE 1 TABLET DAILY; Therapy: 71Vvp3208 to Recorded   2  Fish Oil 1000 MG Oral Capsule; TAKE 1 CAPSULE DAILY IN THE MORNING Recorded   3  Vitamin C 250 MG Oral Tablet; Take 1 tablet daily Recorded  Medication List Reviewed: The medication list was reviewed and updated today  Allergies   1  IVP Dye  Denied    2  Bactrim TABS    Vitals   Vital Signs    Recorded: 37VPU2683 11:33AM   Heart Rate 87   Systolic 328   Diastolic 70   Height 5 ft 10 in   Weight 181 lb    BMI Calculated 25 97   BSA Calculated 2     Physical Exam        Constitutional      General appearance: No acute distress, well appearing and well nourished  Eyes      Conjunctiva and lids: No erythema, swelling or discharge  Ears, Nose, Mouth, and Throat      Hearing: Normal        Pulmonary      Respiratory effort: No increased work of breathing or signs of respiratory distress  Abdomen      Abdomen: Non-tender, no masses         Musculoskeletal      Gait and station: Normal        Psychiatric      Mood and affect: Normal        Results/Data   US KIDNEY AND BLADDER WITH PVR [id number] 10:11AM Jeny Cobian      Test Name Result Flag Reference   US KIDNEY AND BLADDER WITH PVR (Report)     RENAL AND BLADDER ULTRASOUND with postvoid residual            INDICATION: R33 9: Retention of urine, unspecified      N40 0: Benign prostatic hyperplasia without lower urinary tract symptoms  History taken directly from the electronic ordering system  COMPARISON: April 21, 2016           TECHNIQUE:  Ultrasound of the retroperitoneum was performed with a curvilinear transducer utilizing volumetric sweeps and still imaging techniques  Bladder ultrasound was also performed with post void residual            FINDINGS:           KIDNEYS:      Symmetric and normal size  Right kidney: 12 6 cm  Normal echogenicity and contour  Mid pole 2 4 x 2 1 cm simple cyst         No hydronephrosis  No shadowing calculi  No perinephric fluid collections  Left kidney: 10 5 cm  Normal echogenicity and contour  No suspicious masses detected  No hydronephrosis  No shadowing calculi  No perinephric fluid collections  URETERS:      Nonvisualized  BLADDER:       Normally distended  Large left-sided bladder diverticulum is again identified as seen on the recent CT  No focal thickening or mass lesions  Bilateral ureteral jets detected  Prominent prostate measuring 4 4 x 4 1 x 4 2 cm  Marked post void residual urine volume  Estimated volume is 374-cc  IMPRESSION:                1  Severe post void residual measuring 3 74-mL  2  Right renal cyst       3  Large left bladder diverticulum is again noted  4  Prominent prostate measuring 4 4 x 4 1 x 4 2 cm                  Workstation performed: [id number]           Signed by:      Crystal Gutierrez MD      12/24/17      Future Appointments      Date/Time Provider Specialty Site   05/10/2018 09:00 AM Lilliana Goodman [place] [address] [place]     Signatures    Electronically signed by : Jayashree Rollins, ; Dec 29 2017 11:56AM EST                       (Author)     Electronically signed by : CHERRY Livingston ; Dec 29 2017 12:42PM EST

## 2018-01-01 ENCOUNTER — OFFICE VISIT (OUTPATIENT)
Dept: FAMILY MEDICINE CLINIC | Facility: CLINIC | Age: 83
End: 2018-01-01
Payer: COMMERCIAL

## 2018-01-01 ENCOUNTER — TRANSCRIBE ORDERS (OUTPATIENT)
Dept: ADMINISTRATIVE | Facility: HOSPITAL | Age: 83
End: 2018-01-01

## 2018-01-01 ENCOUNTER — HOSPITAL ENCOUNTER (INPATIENT)
Facility: HOSPITAL | Age: 83
LOS: 1 days | DRG: 834 | End: 2018-02-20
Attending: EMERGENCY MEDICINE | Admitting: INTERNAL MEDICINE
Payer: COMMERCIAL

## 2018-01-01 ENCOUNTER — TELEPHONE (OUTPATIENT)
Dept: HEMATOLOGY ONCOLOGY | Facility: CLINIC | Age: 83
End: 2018-01-01

## 2018-01-01 ENCOUNTER — APPOINTMENT (INPATIENT)
Dept: RADIOLOGY | Facility: HOSPITAL | Age: 83
DRG: 834 | End: 2018-01-01
Payer: COMMERCIAL

## 2018-01-01 ENCOUNTER — APPOINTMENT (EMERGENCY)
Dept: RADIOLOGY | Facility: HOSPITAL | Age: 83
DRG: 834 | End: 2018-01-01
Payer: COMMERCIAL

## 2018-01-01 ENCOUNTER — ALLSCRIPTS OFFICE VISIT (OUTPATIENT)
Dept: OTHER | Facility: OTHER | Age: 83
End: 2018-01-01

## 2018-01-01 ENCOUNTER — TELEPHONE (OUTPATIENT)
Dept: UROLOGY | Facility: AMBULATORY SURGERY CENTER | Age: 83
End: 2018-01-01

## 2018-01-01 ENCOUNTER — TELEPHONE (OUTPATIENT)
Dept: FAMILY MEDICINE CLINIC | Facility: CLINIC | Age: 83
End: 2018-01-01

## 2018-01-01 ENCOUNTER — LAB REQUISITION (OUTPATIENT)
Dept: LAB | Facility: HOSPITAL | Age: 83
End: 2018-01-01
Payer: COMMERCIAL

## 2018-01-01 ENCOUNTER — HOSPITAL ENCOUNTER (INPATIENT)
Facility: HOSPITAL | Age: 83
LOS: 18 days | Discharge: HOSPICE | DRG: 834 | End: 2018-03-10
Attending: INTERNAL MEDICINE | Admitting: INTERNAL MEDICINE
Payer: COMMERCIAL

## 2018-01-01 ENCOUNTER — HOSPITAL ENCOUNTER (OUTPATIENT)
Dept: RADIOLOGY | Facility: HOSPITAL | Age: 83
Discharge: HOME | End: 2018-01-29
Payer: COMMERCIAL

## 2018-01-01 ENCOUNTER — APPOINTMENT (EMERGENCY)
Dept: CT IMAGING | Facility: HOSPITAL | Age: 83
DRG: 834 | End: 2018-01-01
Payer: COMMERCIAL

## 2018-01-01 ENCOUNTER — TRANSITIONAL CARE MANAGEMENT (OUTPATIENT)
Dept: FAMILY MEDICINE CLINIC | Facility: CLINIC | Age: 83
End: 2018-01-01

## 2018-01-01 ENCOUNTER — APPOINTMENT (INPATIENT)
Dept: RADIOLOGY | Facility: HOSPITAL | Age: 83
DRG: 834 | End: 2018-01-01
Attending: INTERNAL MEDICINE
Payer: COMMERCIAL

## 2018-01-01 ENCOUNTER — GENERIC CONVERSION - ENCOUNTER (OUTPATIENT)
Dept: OTHER | Facility: OTHER | Age: 83
End: 2018-01-01

## 2018-01-01 VITALS
TEMPERATURE: 99.8 F | BODY MASS INDEX: 22.03 KG/M2 | WEIGHT: 153.88 LBS | HEART RATE: 95 BPM | OXYGEN SATURATION: 94 % | SYSTOLIC BLOOD PRESSURE: 106 MMHG | HEIGHT: 70 IN | DIASTOLIC BLOOD PRESSURE: 60 MMHG | RESPIRATION RATE: 20 BRPM

## 2018-01-01 VITALS
SYSTOLIC BLOOD PRESSURE: 118 MMHG | HEART RATE: 64 BPM | DIASTOLIC BLOOD PRESSURE: 68 MMHG | BODY MASS INDEX: 24.05 KG/M2 | RESPIRATION RATE: 16 BRPM | WEIGHT: 168 LBS | HEIGHT: 70 IN

## 2018-01-01 VITALS
SYSTOLIC BLOOD PRESSURE: 129 MMHG | OXYGEN SATURATION: 95 % | DIASTOLIC BLOOD PRESSURE: 69 MMHG | HEART RATE: 79 BPM | RESPIRATION RATE: 18 BRPM | TEMPERATURE: 99 F

## 2018-01-01 VITALS
DIASTOLIC BLOOD PRESSURE: 82 MMHG | WEIGHT: 175.4 LBS | RESPIRATION RATE: 16 BRPM | SYSTOLIC BLOOD PRESSURE: 140 MMHG | HEIGHT: 70 IN | HEART RATE: 72 BPM | BODY MASS INDEX: 25.11 KG/M2 | TEMPERATURE: 97.1 F

## 2018-01-01 VITALS
SYSTOLIC BLOOD PRESSURE: 142 MMHG | DIASTOLIC BLOOD PRESSURE: 70 MMHG | BODY MASS INDEX: 25.91 KG/M2 | WEIGHT: 181 LBS | HEIGHT: 70 IN | HEART RATE: 87 BPM

## 2018-01-01 VITALS
HEIGHT: 70 IN | SYSTOLIC BLOOD PRESSURE: 104 MMHG | HEART RATE: 84 BPM | DIASTOLIC BLOOD PRESSURE: 60 MMHG | WEIGHT: 177 LBS | BODY MASS INDEX: 25.34 KG/M2

## 2018-01-01 VITALS
HEART RATE: 84 BPM | SYSTOLIC BLOOD PRESSURE: 108 MMHG | BODY MASS INDEX: 24.05 KG/M2 | WEIGHT: 168 LBS | HEIGHT: 70 IN | DIASTOLIC BLOOD PRESSURE: 56 MMHG | TEMPERATURE: 100.3 F

## 2018-01-01 VITALS
SYSTOLIC BLOOD PRESSURE: 122 MMHG | TEMPERATURE: 97.5 F | BODY MASS INDEX: 24.82 KG/M2 | DIASTOLIC BLOOD PRESSURE: 68 MMHG | WEIGHT: 173.4 LBS | RESPIRATION RATE: 16 BRPM | HEIGHT: 70 IN | HEART RATE: 88 BPM

## 2018-01-01 VITALS
BODY MASS INDEX: 25.52 KG/M2 | HEART RATE: 88 BPM | WEIGHT: 178.25 LBS | HEIGHT: 70 IN | DIASTOLIC BLOOD PRESSURE: 60 MMHG | SYSTOLIC BLOOD PRESSURE: 106 MMHG

## 2018-01-01 VITALS
WEIGHT: 178.5 LBS | SYSTOLIC BLOOD PRESSURE: 130 MMHG | HEART RATE: 74 BPM | HEIGHT: 70 IN | DIASTOLIC BLOOD PRESSURE: 68 MMHG | BODY MASS INDEX: 25.56 KG/M2

## 2018-01-01 VITALS
SYSTOLIC BLOOD PRESSURE: 136 MMHG | HEART RATE: 84 BPM | DIASTOLIC BLOOD PRESSURE: 82 MMHG | WEIGHT: 179 LBS | HEIGHT: 70 IN | BODY MASS INDEX: 25.62 KG/M2

## 2018-01-01 VITALS
SYSTOLIC BLOOD PRESSURE: 124 MMHG | DIASTOLIC BLOOD PRESSURE: 74 MMHG | BODY MASS INDEX: 25.34 KG/M2 | HEART RATE: 72 BPM | HEIGHT: 70 IN | WEIGHT: 177 LBS

## 2018-01-01 VITALS
HEART RATE: 76 BPM | DIASTOLIC BLOOD PRESSURE: 80 MMHG | BODY MASS INDEX: 25.2 KG/M2 | SYSTOLIC BLOOD PRESSURE: 140 MMHG | WEIGHT: 176 LBS | HEIGHT: 70 IN

## 2018-01-01 DIAGNOSIS — J32.9 OTHER SINUSITIS, UNSPECIFIED CHRONICITY: ICD-10-CM

## 2018-01-01 DIAGNOSIS — C92.00 ACUTE MYELOID LEUKEMIA NOT HAVING ACHIEVED REMISSION (HCC): ICD-10-CM

## 2018-01-01 DIAGNOSIS — R23.8 GENERALIZED SKIN PAPULES: ICD-10-CM

## 2018-01-01 DIAGNOSIS — R26.89 BALANCE PROBLEMS: Primary | ICD-10-CM

## 2018-01-01 DIAGNOSIS — J18.9 COMMUNITY ACQUIRED PNEUMONIA OF RIGHT UPPER LOBE OF LUNG: ICD-10-CM

## 2018-01-01 DIAGNOSIS — R50.81 FEVER IN OTHER DISEASES: ICD-10-CM

## 2018-01-01 DIAGNOSIS — M79.10 MYALGIA: ICD-10-CM

## 2018-01-01 DIAGNOSIS — R06.6 HICCUP: Primary | ICD-10-CM

## 2018-01-01 DIAGNOSIS — R50.81 FEBRILE NEUTROPENIA (HCC): ICD-10-CM

## 2018-01-01 DIAGNOSIS — H83.09 LABYRINTHITIS, UNSPECIFIED LATERALITY: ICD-10-CM

## 2018-01-01 DIAGNOSIS — D64.9 ANEMIA: ICD-10-CM

## 2018-01-01 DIAGNOSIS — Z51.5 HOSPICE CARE: ICD-10-CM

## 2018-01-01 DIAGNOSIS — C92.02 ACUTE MYELOID LEUKEMIA IN RELAPSE (HCC): ICD-10-CM

## 2018-01-01 DIAGNOSIS — L20.9 ATOPIC DERMATITIS, UNSPECIFIED TYPE: ICD-10-CM

## 2018-01-01 DIAGNOSIS — R78.81 BACTEREMIA: ICD-10-CM

## 2018-01-01 DIAGNOSIS — R06.6 INTRACTABLE HICCUPS: ICD-10-CM

## 2018-01-01 DIAGNOSIS — R26.89 BALANCE PROBLEMS: ICD-10-CM

## 2018-01-01 DIAGNOSIS — J30.9 ALLERGIC RHINITIS, UNSPECIFIED CHRONICITY, UNSPECIFIED SEASONALITY, UNSPECIFIED TRIGGER: ICD-10-CM

## 2018-01-01 DIAGNOSIS — M10.9 GOUT, UNSPECIFIED CAUSE, UNSPECIFIED CHRONICITY, UNSPECIFIED SITE: ICD-10-CM

## 2018-01-01 DIAGNOSIS — L02.91 ABSCESS: ICD-10-CM

## 2018-01-01 DIAGNOSIS — C95.00 ACUTE LEUKEMIA (HCC): Primary | ICD-10-CM

## 2018-01-01 DIAGNOSIS — T40.2X5A CONSTIPATION DUE TO OPIOID THERAPY: ICD-10-CM

## 2018-01-01 DIAGNOSIS — J01.90 ACUTE SINUSITIS TREATED WITH ANTIBIOTICS IN THE PAST 60 DAYS: Primary | ICD-10-CM

## 2018-01-01 DIAGNOSIS — R53.83 LETHARGIC: ICD-10-CM

## 2018-01-01 DIAGNOSIS — R21 RASH AND OTHER NONSPECIFIC SKIN ERUPTION: ICD-10-CM

## 2018-01-01 DIAGNOSIS — D70.9 FEBRILE NEUTROPENIA (HCC): ICD-10-CM

## 2018-01-01 DIAGNOSIS — R52 PAIN: ICD-10-CM

## 2018-01-01 DIAGNOSIS — D72.829 LEUKOCYTOSIS: Primary | ICD-10-CM

## 2018-01-01 DIAGNOSIS — R45.1 AGITATION: ICD-10-CM

## 2018-01-01 DIAGNOSIS — F41.9 ANXIETY: ICD-10-CM

## 2018-01-01 DIAGNOSIS — D69.6 THROMBOCYTOPENIA (HCC): ICD-10-CM

## 2018-01-01 DIAGNOSIS — D72.821 MONOCYTOSIS: ICD-10-CM

## 2018-01-01 DIAGNOSIS — K59.03 CONSTIPATION DUE TO OPIOID THERAPY: ICD-10-CM

## 2018-01-01 DIAGNOSIS — R42 DIZZINESS: Primary | ICD-10-CM

## 2018-01-01 LAB
ABO GROUP BLD BPU: NORMAL
ABO GROUP BLD: NORMAL
ADENOVIRUS: NOT DETECTED
ALBUMIN SERPL BCP-MCNC: 2.4 G/DL (ref 3.5–5)
ALBUMIN SERPL BCP-MCNC: 2.5 G/DL (ref 3.5–5)
ALBUMIN SERPL BCP-MCNC: 3.2 G/DL (ref 3.5–5)
ALP SERPL-CCNC: 100 U/L (ref 46–116)
ALP SERPL-CCNC: 106 U/L (ref 46–116)
ALP SERPL-CCNC: 66 U/L (ref 46–116)
ALP SERPL-CCNC: 79 U/L (ref 46–116)
ALP SERPL-CCNC: 94 U/L (ref 46–116)
ALT SERPL W P-5'-P-CCNC: 21 U/L (ref 12–78)
ALT SERPL W P-5'-P-CCNC: 23 U/L (ref 12–78)
ALT SERPL W P-5'-P-CCNC: 24 U/L (ref 12–78)
ALT SERPL W P-5'-P-CCNC: 25 U/L (ref 12–78)
ALT SERPL W P-5'-P-CCNC: 33 U/L (ref 12–78)
ANION GAP SERPL CALCULATED.3IONS-SCNC: 10 MMOL/L (ref 4–13)
ANION GAP SERPL CALCULATED.3IONS-SCNC: 11 MMOL/L (ref 4–13)
ANION GAP SERPL CALCULATED.3IONS-SCNC: 12 MMOL/L (ref 4–13)
ANION GAP SERPL CALCULATED.3IONS-SCNC: 7 MMOL/L (ref 4–13)
ANION GAP SERPL CALCULATED.3IONS-SCNC: 8 MMOL/L (ref 4–13)
ANION GAP SERPL CALCULATED.3IONS-SCNC: 9 MMOL/L (ref 4–13)
ANISOCYTOSIS BLD QL SMEAR: PRESENT
APTT PPP: 41 SECONDS (ref 23–35)
APTT PPP: 46 SECONDS (ref 23–35)
AST SERPL W P-5'-P-CCNC: 23 U/L (ref 5–45)
AST SERPL W P-5'-P-CCNC: 26 U/L (ref 5–45)
AST SERPL W P-5'-P-CCNC: 37 U/L (ref 5–45)
AST SERPL W P-5'-P-CCNC: 40 U/L (ref 5–45)
AST SERPL W P-5'-P-CCNC: 55 U/L (ref 5–45)
ATRIAL RATE: 90 BPM
BACTERIA BLD CULT: ABNORMAL
BACTERIA BLD CULT: NORMAL
BACTERIA SPT RESP CULT: NORMAL
BACTERIA THROAT CULT: NORMAL
BACTERIA UR QL AUTO: ABNORMAL /HPF
BASOPHILS # BLD MANUAL: 0 THOUSAND/UL (ref 0–0.1)
BASOPHILS NFR MAR MANUAL: 0 % (ref 0–1)
BILIRUB DIRECT SERPL-MCNC: 0.18 MG/DL (ref 0–0.2)
BILIRUB SERPL-MCNC: 0.57 MG/DL (ref 0.2–1)
BILIRUB SERPL-MCNC: 0.6 MG/DL (ref 0.2–1)
BILIRUB SERPL-MCNC: 0.66 MG/DL (ref 0.2–1)
BILIRUB SERPL-MCNC: 0.68 MG/DL (ref 0.2–1)
BILIRUB SERPL-MCNC: 0.75 MG/DL (ref 0.2–1)
BILIRUB UR QL STRIP: NEGATIVE
BLASTS NFR BLD MANUAL: 12 %
BLASTS NFR BLD MANUAL: 14 %
BLASTS NFR BLD MANUAL: 15 %
BLASTS NFR BLD MANUAL: 16 %
BLASTS NFR BLD MANUAL: 25 %
BLASTS NFR BLD MANUAL: 3 %
BLASTS NFR BLD MANUAL: 30 %
BLASTS NFR BLD MANUAL: 32 %
BLASTS NFR BLD MANUAL: 4 %
BLASTS NFR BLD MANUAL: 49 %
BLASTS NFR BLD MANUAL: 50 %
BLASTS NFR BLD MANUAL: 58 %
BLASTS NFR BLD MANUAL: 58 %
BLASTS NFR BLD MANUAL: 6 %
BLASTS NFR BLD MANUAL: 6 %
BLASTS NFR BLD MANUAL: 9 %
BLD GP AB SCN SERPL QL: NEGATIVE
BLD SMEAR INTERP: NORMAL
BPU ID: NORMAL
BUN SERPL-MCNC: 12 MG/DL (ref 5–25)
BUN SERPL-MCNC: 14 MG/DL (ref 5–25)
BUN SERPL-MCNC: 15 MG/DL (ref 5–25)
BUN SERPL-MCNC: 16 MG/DL (ref 5–25)
BUN SERPL-MCNC: 18 MG/DL (ref 5–25)
BUN SERPL-MCNC: 18 MG/DL (ref 5–25)
BUN SERPL-MCNC: 20 MG/DL (ref 5–25)
BUN SERPL-MCNC: 23 MG/DL (ref 5–25)
BUN SERPL-MCNC: 25 MG/DL (ref 5–25)
BURR CELLS BLD QL SMEAR: PRESENT
C PNEUM DNA SPEC QL NAA+PROBE: NOT DETECTED
CALCIUM SERPL-MCNC: 7 MG/DL (ref 8.3–10.1)
CALCIUM SERPL-MCNC: 7.2 MG/DL (ref 8.3–10.1)
CALCIUM SERPL-MCNC: 7.2 MG/DL (ref 8.3–10.1)
CALCIUM SERPL-MCNC: 7.3 MG/DL (ref 8.3–10.1)
CALCIUM SERPL-MCNC: 7.4 MG/DL (ref 8.3–10.1)
CALCIUM SERPL-MCNC: 7.6 MG/DL (ref 8.3–10.1)
CALCIUM SERPL-MCNC: 7.7 MG/DL (ref 8.3–10.1)
CALCIUM SERPL-MCNC: 8 MG/DL (ref 8.3–10.1)
CALCIUM SERPL-MCNC: 8 MG/DL (ref 8.3–10.1)
CALCIUM SERPL-MCNC: 8.1 MG/DL (ref 8.3–10.1)
CALCIUM SERPL-MCNC: 8.1 MG/DL (ref 8.3–10.1)
CALCIUM SERPL-MCNC: 8.2 MG/DL (ref 8.3–10.1)
CALCIUM SERPL-MCNC: 8.2 MG/DL (ref 8.3–10.1)
CALCIUM SERPL-MCNC: 8.3 MG/DL (ref 8.3–10.1)
CALCIUM SERPL-MCNC: 8.3 MG/DL (ref 8.3–10.1)
CHLORIDE SERPL-SCNC: 100 MMOL/L (ref 100–108)
CHLORIDE SERPL-SCNC: 101 MMOL/L (ref 100–108)
CHLORIDE SERPL-SCNC: 103 MMOL/L (ref 100–108)
CHLORIDE SERPL-SCNC: 104 MMOL/L (ref 100–108)
CHLORIDE SERPL-SCNC: 105 MMOL/L (ref 100–108)
CHLORIDE SERPL-SCNC: 106 MMOL/L (ref 100–108)
CHLORIDE SERPL-SCNC: 107 MMOL/L (ref 100–108)
CHLORIDE SERPL-SCNC: 107 MMOL/L (ref 100–108)
CHLORIDE SERPL-SCNC: 108 MMOL/L (ref 100–108)
CHLORIDE SERPL-SCNC: 109 MMOL/L (ref 100–108)
CHOLEST SERPL-MCNC: 61 MG/DL (ref 50–200)
CLARITY UR: CLEAR
CO2 SERPL-SCNC: 20 MMOL/L (ref 21–32)
CO2 SERPL-SCNC: 20 MMOL/L (ref 21–32)
CO2 SERPL-SCNC: 21 MMOL/L (ref 21–32)
CO2 SERPL-SCNC: 22 MMOL/L (ref 21–32)
CO2 SERPL-SCNC: 23 MMOL/L (ref 21–32)
CO2 SERPL-SCNC: 24 MMOL/L (ref 21–32)
COLOR UR: YELLOW
CREAT SERPL-MCNC: 0.88 MG/DL (ref 0.6–1.3)
CREAT SERPL-MCNC: 0.96 MG/DL (ref 0.6–1.3)
CREAT SERPL-MCNC: 1.01 MG/DL (ref 0.6–1.3)
CREAT SERPL-MCNC: 1.05 MG/DL (ref 0.6–1.3)
CREAT SERPL-MCNC: 1.07 MG/DL (ref 0.6–1.3)
CREAT SERPL-MCNC: 1.09 MG/DL (ref 0.6–1.3)
CREAT SERPL-MCNC: 1.1 MG/DL (ref 0.6–1.3)
CREAT SERPL-MCNC: 1.11 MG/DL (ref 0.6–1.3)
CREAT SERPL-MCNC: 1.13 MG/DL (ref 0.6–1.3)
CREAT SERPL-MCNC: 1.31 MG/DL (ref 0.6–1.3)
CREAT SERPL-MCNC: 1.33 MG/DL (ref 0.6–1.3)
CREAT UR-MCNC: 95.1 MG/DL
CROSSMATCH: NORMAL
EOSINOPHIL # BLD MANUAL: 0 THOUSAND/UL (ref 0–0.4)
EOSINOPHIL NFR BLD MANUAL: 0 % (ref 0–6)
ERYTHROCYTE [DISTWIDTH] IN BLOOD BY AUTOMATED COUNT: 13.9 % (ref 11.6–15.1)
ERYTHROCYTE [DISTWIDTH] IN BLOOD BY AUTOMATED COUNT: 14 % (ref 11.6–15.1)
ERYTHROCYTE [DISTWIDTH] IN BLOOD BY AUTOMATED COUNT: 14 % (ref 11.6–15.1)
ERYTHROCYTE [DISTWIDTH] IN BLOOD BY AUTOMATED COUNT: 14.1 % (ref 11.6–15.1)
ERYTHROCYTE [DISTWIDTH] IN BLOOD BY AUTOMATED COUNT: 14.3 % (ref 11.6–15.1)
ERYTHROCYTE [DISTWIDTH] IN BLOOD BY AUTOMATED COUNT: 14.5 % (ref 11.6–15.1)
ERYTHROCYTE [DISTWIDTH] IN BLOOD BY AUTOMATED COUNT: 14.6 % (ref 11.6–15.1)
ERYTHROCYTE [DISTWIDTH] IN BLOOD BY AUTOMATED COUNT: 14.6 % (ref 11.6–15.1)
ERYTHROCYTE [DISTWIDTH] IN BLOOD BY AUTOMATED COUNT: 14.7 % (ref 11.6–15.1)
ERYTHROCYTE [DISTWIDTH] IN BLOOD BY AUTOMATED COUNT: 14.8 % (ref 11.6–15.1)
ERYTHROCYTE [DISTWIDTH] IN BLOOD BY AUTOMATED COUNT: 14.9 % (ref 11.6–15.1)
ERYTHROCYTE [DISTWIDTH] IN BLOOD BY AUTOMATED COUNT: 15.1 % (ref 11.6–15.1)
ERYTHROCYTE [DISTWIDTH] IN BLOOD BY AUTOMATED COUNT: 15.3 % (ref 11.6–15.1)
ERYTHROCYTE [DISTWIDTH] IN BLOOD BY AUTOMATED COUNT: 15.3 % (ref 11.6–15.1)
ERYTHROCYTE [DISTWIDTH] IN BLOOD BY AUTOMATED COUNT: 15.5 % (ref 11.6–15.1)
ERYTHROCYTE [DISTWIDTH] IN BLOOD BY AUTOMATED COUNT: 15.8 % (ref 11.6–15.1)
EST. AVERAGE GLUCOSE BLD GHB EST-MCNC: 171 MG/DL
FDP BLD QL AGGL: <10
FERRITIN SERPL-MCNC: 1008 NG/ML (ref 8–388)
FIBRINOGEN PPP-MCNC: 487 MG/DL (ref 227–495)
FINE GRAN CASTS URNS QL MICRO: ABNORMAL /LPF
FLUAV AG SPEC QL: NORMAL
FLUAV H1 RNA SPEC QL NAA+PROBE: NOT DETECTED
FLUAV H3 RNA SPEC QL NAA+PROBE: NOT DETECTED
FLUAV RNA SPEC QL NAA+PROBE: NOT DETECTED
FLUBV AG SPEC QL: NORMAL
FLUBV RNA SPEC QL NAA+PROBE: NOT DETECTED
GFR SERPL CREATININE-BSD FRML MDRD: 48 ML/MIN/1.73SQ M
GFR SERPL CREATININE-BSD FRML MDRD: 49 ML/MIN/1.73SQ M
GFR SERPL CREATININE-BSD FRML MDRD: 59 ML/MIN/1.73SQ M
GFR SERPL CREATININE-BSD FRML MDRD: 60 ML/MIN/1.73SQ M
GFR SERPL CREATININE-BSD FRML MDRD: 61 ML/MIN/1.73SQ M
GFR SERPL CREATININE-BSD FRML MDRD: 63 ML/MIN/1.73SQ M
GFR SERPL CREATININE-BSD FRML MDRD: 64 ML/MIN/1.73SQ M
GFR SERPL CREATININE-BSD FRML MDRD: 67 ML/MIN/1.73SQ M
GFR SERPL CREATININE-BSD FRML MDRD: 71 ML/MIN/1.73SQ M
GFR SERPL CREATININE-BSD FRML MDRD: 78 ML/MIN/1.73SQ M
GLUCOSE SERPL-MCNC: 105 MG/DL (ref 65–140)
GLUCOSE SERPL-MCNC: 106 MG/DL (ref 65–140)
GLUCOSE SERPL-MCNC: 106 MG/DL (ref 65–140)
GLUCOSE SERPL-MCNC: 107 MG/DL (ref 65–140)
GLUCOSE SERPL-MCNC: 107 MG/DL (ref 65–140)
GLUCOSE SERPL-MCNC: 116 MG/DL (ref 65–140)
GLUCOSE SERPL-MCNC: 117 MG/DL (ref 65–140)
GLUCOSE SERPL-MCNC: 125 MG/DL (ref 65–140)
GLUCOSE SERPL-MCNC: 89 MG/DL (ref 65–140)
GLUCOSE SERPL-MCNC: 95 MG/DL (ref 65–140)
GLUCOSE SERPL-MCNC: 96 MG/DL (ref 65–140)
GLUCOSE SERPL-MCNC: 96 MG/DL (ref 65–140)
GLUCOSE SERPL-MCNC: 97 MG/DL (ref 65–140)
GLUCOSE SERPL-MCNC: 98 MG/DL (ref 65–140)
GLUCOSE SERPL-MCNC: 98 MG/DL (ref 65–140)
GLUCOSE UR STRIP-MCNC: NEGATIVE MG/DL
GRAM STN SPEC: ABNORMAL
GRAM STN SPEC: NORMAL
HBA1C MFR BLD: 7.6 % (ref 4.2–6.3)
HBOV DNA SPEC QL NAA+PROBE: NOT DETECTED
HCOV 229E RNA SPEC QL NAA+PROBE: NOT DETECTED
HCOV HKU1 RNA SPEC QL NAA+PROBE: NOT DETECTED
HCOV NL63 RNA SPEC QL NAA+PROBE: NOT DETECTED
HCOV OC43 RNA SPEC QL NAA+PROBE: NOT DETECTED
HCT VFR BLD AUTO: 18.7 % (ref 36.5–49.3)
HCT VFR BLD AUTO: 19.7 % (ref 36.5–49.3)
HCT VFR BLD AUTO: 19.7 % (ref 36.5–49.3)
HCT VFR BLD AUTO: 19.8 % (ref 36.5–49.3)
HCT VFR BLD AUTO: 20.2 % (ref 36.5–49.3)
HCT VFR BLD AUTO: 20.5 % (ref 36.5–49.3)
HCT VFR BLD AUTO: 20.8 % (ref 36.5–49.3)
HCT VFR BLD AUTO: 21.1 % (ref 36.5–49.3)
HCT VFR BLD AUTO: 21.2 % (ref 36.5–49.3)
HCT VFR BLD AUTO: 21.3 % (ref 36.5–49.3)
HCT VFR BLD AUTO: 21.5 % (ref 36.5–49.3)
HCT VFR BLD AUTO: 21.7 % (ref 36.5–49.3)
HCT VFR BLD AUTO: 22.3 % (ref 36.5–49.3)
HCT VFR BLD AUTO: 22.5 % (ref 36.5–49.3)
HCT VFR BLD AUTO: 22.8 % (ref 36.5–49.3)
HCT VFR BLD AUTO: 23 % (ref 36.5–49.3)
HCT VFR BLD AUTO: 23.4 % (ref 36.5–49.3)
HCT VFR BLD AUTO: 23.5 % (ref 36.5–49.3)
HCT VFR BLD AUTO: 23.6 % (ref 36.5–49.3)
HCT VFR BLD AUTO: 23.7 % (ref 36.5–49.3)
HCT VFR BLD AUTO: 23.8 % (ref 36.5–49.3)
HCT VFR BLD AUTO: 24.2 % (ref 36.5–49.3)
HDLC SERPL-MCNC: 24 MG/DL (ref 40–60)
HGB BLD-MCNC: 6.6 G/DL (ref 12–17)
HGB BLD-MCNC: 6.9 G/DL (ref 12–17)
HGB BLD-MCNC: 7 G/DL (ref 12–17)
HGB BLD-MCNC: 7.1 G/DL (ref 12–17)
HGB BLD-MCNC: 7.2 G/DL (ref 12–17)
HGB BLD-MCNC: 7.3 G/DL (ref 12–17)
HGB BLD-MCNC: 7.4 G/DL (ref 12–17)
HGB BLD-MCNC: 7.4 G/DL (ref 12–17)
HGB BLD-MCNC: 7.5 G/DL (ref 12–17)
HGB BLD-MCNC: 7.6 G/DL (ref 12–17)
HGB BLD-MCNC: 7.7 G/DL (ref 12–17)
HGB BLD-MCNC: 7.7 G/DL (ref 12–17)
HGB BLD-MCNC: 7.8 G/DL (ref 12–17)
HGB BLD-MCNC: 7.9 G/DL (ref 12–17)
HGB BLD-MCNC: 8 G/DL (ref 12–17)
HGB BLD-MCNC: 8 G/DL (ref 12–17)
HGB BLD-MCNC: 8.1 G/DL (ref 12–17)
HGB BLD-MCNC: 8.3 G/DL (ref 12–17)
HGB BLD-MCNC: 8.4 G/DL (ref 12–17)
HGB UR QL STRIP.AUTO: ABNORMAL
HGB UR QL STRIP.AUTO: NEGATIVE
HPIV1 RNA SPEC QL NAA+PROBE: NOT DETECTED
HPIV2 RNA SPEC QL NAA+PROBE: NOT DETECTED
HPIV3 RNA SPEC QL NAA+PROBE: NOT DETECTED
HPIV4 RNA SPEC QL NAA+PROBE: NOT DETECTED
HYPERCHROMIA BLD QL SMEAR: PRESENT
INR PPP: 1.32 (ref 0.86–1.16)
INR PPP: 1.37 (ref 0.86–1.16)
INR PPP: 1.39 (ref 0.86–1.16)
IRON SATN MFR SERPL: 77 %
IRON SERPL-MCNC: 114 UG/DL (ref 65–175)
KETONES UR STRIP-MCNC: NEGATIVE MG/DL
L PNEUMO1 AG UR QL IA.RAPID: NEGATIVE
LACTATE SERPL-SCNC: 1 MMOL/L (ref 0.5–2)
LACTATE SERPL-SCNC: 1.3 MMOL/L (ref 0.5–2)
LACTATE SERPL-SCNC: 2.1 MMOL/L (ref 0.5–2)
LDH SERPL-CCNC: 486 U/L (ref 81–234)
LDLC SERPL CALC-MCNC: 22 MG/DL (ref 0–100)
LEUKOCYTE ESTERASE UR QL STRIP: NEGATIVE
LYMPHOCYTES # BLD AUTO: 11 % (ref 14–44)
LYMPHOCYTES # BLD AUTO: 11 % (ref 14–44)
LYMPHOCYTES # BLD AUTO: 11.3 THOUSAND/UL (ref 0.6–4.47)
LYMPHOCYTES # BLD AUTO: 12 % (ref 14–44)
LYMPHOCYTES # BLD AUTO: 14 % (ref 14–44)
LYMPHOCYTES # BLD AUTO: 14.64 THOUSAND/UL (ref 0.6–4.47)
LYMPHOCYTES # BLD AUTO: 15 % (ref 14–44)
LYMPHOCYTES # BLD AUTO: 15 % (ref 14–44)
LYMPHOCYTES # BLD AUTO: 15.71 THOUSAND/UL (ref 0.6–4.47)
LYMPHOCYTES # BLD AUTO: 16 % (ref 14–44)
LYMPHOCYTES # BLD AUTO: 18 % (ref 14–44)
LYMPHOCYTES # BLD AUTO: 18 % (ref 14–44)
LYMPHOCYTES # BLD AUTO: 19 % (ref 14–44)
LYMPHOCYTES # BLD AUTO: 2.28 THOUSAND/UL (ref 0.6–4.47)
LYMPHOCYTES # BLD AUTO: 20 % (ref 14–44)
LYMPHOCYTES # BLD AUTO: 20.26 THOUSAND/UL (ref 0.6–4.47)
LYMPHOCYTES # BLD AUTO: 21 % (ref 14–44)
LYMPHOCYTES # BLD AUTO: 21 % (ref 14–44)
LYMPHOCYTES # BLD AUTO: 21.02 THOUSAND/UL (ref 0.6–4.47)
LYMPHOCYTES # BLD AUTO: 23 % (ref 14–44)
LYMPHOCYTES # BLD AUTO: 24.66 THOUSAND/UL (ref 0.6–4.47)
LYMPHOCYTES # BLD AUTO: 26 % (ref 14–44)
LYMPHOCYTES # BLD AUTO: 27 % (ref 14–44)
LYMPHOCYTES # BLD AUTO: 28 % (ref 14–44)
LYMPHOCYTES # BLD AUTO: 3.34 THOUSAND/UL (ref 0.6–4.47)
LYMPHOCYTES # BLD AUTO: 3.71 THOUSAND/UL (ref 0.6–4.47)
LYMPHOCYTES # BLD AUTO: 4.37 THOUSAND/UL (ref 0.6–4.47)
LYMPHOCYTES # BLD AUTO: 4.49 THOUSAND/UL (ref 0.6–4.47)
LYMPHOCYTES # BLD AUTO: 5.17 THOUSAND/UL (ref 0.6–4.47)
LYMPHOCYTES # BLD AUTO: 5.88 THOUSAND/UL (ref 0.6–4.47)
LYMPHOCYTES # BLD AUTO: 6.09 THOUSAND/UL (ref 0.6–4.47)
LYMPHOCYTES # BLD AUTO: 6.42 THOUSAND/UL (ref 0.6–4.47)
LYMPHOCYTES # BLD AUTO: 7.61 THOUSAND/UL (ref 0.6–4.47)
LYMPHOCYTES # BLD AUTO: 7.82 THOUSAND/UL (ref 0.6–4.47)
LYMPHOCYTES # BLD AUTO: 8 % (ref 14–44)
LYMPHOCYTES # BLD AUTO: 8.74 THOUSAND/UL (ref 0.6–4.47)
LYMPHOCYTES # BLD AUTO: 9.19 THOUSAND/UL (ref 0.6–4.47)
LYMPHOCYTES # BLD AUTO: 9.32 THOUSAND/UL (ref 0.6–4.47)
LYMPHOCYTES # BLD AUTO: 9.51 THOUSAND/UL (ref 0.6–4.47)
M PNEUMO DNA SPEC QL NAA+PROBE: NOT DETECTED
MACROCYTES BLD QL AUTO: PRESENT
MAGNESIUM SERPL-MCNC: 2 MG/DL (ref 1.6–2.6)
MAGNESIUM SERPL-MCNC: 2 MG/DL (ref 1.6–2.6)
MAGNESIUM SERPL-MCNC: 2.1 MG/DL (ref 1.6–2.6)
MAGNESIUM SERPL-MCNC: 2.2 MG/DL (ref 1.6–2.6)
MAGNESIUM SERPL-MCNC: 2.3 MG/DL (ref 1.6–2.6)
MCH RBC QN AUTO: 30.2 PG (ref 26.8–34.3)
MCH RBC QN AUTO: 30.3 PG (ref 26.8–34.3)
MCH RBC QN AUTO: 30.7 PG (ref 26.8–34.3)
MCH RBC QN AUTO: 30.7 PG (ref 26.8–34.3)
MCH RBC QN AUTO: 30.9 PG (ref 26.8–34.3)
MCH RBC QN AUTO: 30.9 PG (ref 26.8–34.3)
MCH RBC QN AUTO: 31.1 PG (ref 26.8–34.3)
MCH RBC QN AUTO: 31.3 PG (ref 26.8–34.3)
MCH RBC QN AUTO: 31.3 PG (ref 26.8–34.3)
MCH RBC QN AUTO: 31.4 PG (ref 26.8–34.3)
MCH RBC QN AUTO: 31.4 PG (ref 26.8–34.3)
MCH RBC QN AUTO: 31.5 PG (ref 26.8–34.3)
MCH RBC QN AUTO: 31.7 PG (ref 26.8–34.3)
MCH RBC QN AUTO: 31.9 PG (ref 26.8–34.3)
MCH RBC QN AUTO: 32 PG (ref 26.8–34.3)
MCH RBC QN AUTO: 32 PG (ref 26.8–34.3)
MCH RBC QN AUTO: 32.2 PG (ref 26.8–34.3)
MCH RBC QN AUTO: 32.3 PG (ref 26.8–34.3)
MCH RBC QN AUTO: 32.5 PG (ref 26.8–34.3)
MCH RBC QN AUTO: 32.5 PG (ref 26.8–34.3)
MCH RBC QN AUTO: 32.7 PG (ref 26.8–34.3)
MCH RBC QN AUTO: 32.9 PG (ref 26.8–34.3)
MCHC RBC AUTO-ENTMCNC: 33.6 G/DL (ref 31.4–37.4)
MCHC RBC AUTO-ENTMCNC: 33.8 G/DL (ref 31.4–37.4)
MCHC RBC AUTO-ENTMCNC: 34.1 G/DL (ref 31.4–37.4)
MCHC RBC AUTO-ENTMCNC: 34.2 G/DL (ref 31.4–37.4)
MCHC RBC AUTO-ENTMCNC: 34.2 G/DL (ref 31.4–37.4)
MCHC RBC AUTO-ENTMCNC: 34.4 G/DL (ref 31.4–37.4)
MCHC RBC AUTO-ENTMCNC: 34.5 G/DL (ref 31.4–37.4)
MCHC RBC AUTO-ENTMCNC: 34.6 G/DL (ref 31.4–37.4)
MCHC RBC AUTO-ENTMCNC: 34.7 G/DL (ref 31.4–37.4)
MCHC RBC AUTO-ENTMCNC: 34.7 G/DL (ref 31.4–37.4)
MCHC RBC AUTO-ENTMCNC: 34.8 G/DL (ref 31.4–37.4)
MCHC RBC AUTO-ENTMCNC: 34.9 G/DL (ref 31.4–37.4)
MCHC RBC AUTO-ENTMCNC: 35 G/DL (ref 31.4–37.4)
MCHC RBC AUTO-ENTMCNC: 35.1 G/DL (ref 31.4–37.4)
MCHC RBC AUTO-ENTMCNC: 35.1 G/DL (ref 31.4–37.4)
MCHC RBC AUTO-ENTMCNC: 35.2 G/DL (ref 31.4–37.4)
MCHC RBC AUTO-ENTMCNC: 35.3 G/DL (ref 31.4–37.4)
MCHC RBC AUTO-ENTMCNC: 35.5 G/DL (ref 31.4–37.4)
MCHC RBC AUTO-ENTMCNC: 35.5 G/DL (ref 31.4–37.4)
MCHC RBC AUTO-ENTMCNC: 35.9 G/DL (ref 31.4–37.4)
MCHC RBC AUTO-ENTMCNC: 36 G/DL (ref 31.4–37.4)
MCHC RBC AUTO-ENTMCNC: 36.4 G/DL (ref 31.4–37.4)
MCV RBC AUTO: 88 FL (ref 82–98)
MCV RBC AUTO: 89 FL (ref 82–98)
MCV RBC AUTO: 90 FL (ref 82–98)
MCV RBC AUTO: 91 FL (ref 82–98)
MCV RBC AUTO: 92 FL (ref 82–98)
MCV RBC AUTO: 93 FL (ref 82–98)
MCV RBC AUTO: 93 FL (ref 82–98)
METAPNEUMOVIRUS: NOT DETECTED
MISCELLANEOUS LAB TEST RESULT: NORMAL
MONOCYTES # BLD AUTO: 10.15 THOUSAND/UL (ref 0–1.22)
MONOCYTES # BLD AUTO: 13.53 THOUSAND/UL (ref 0–1.22)
MONOCYTES # BLD AUTO: 16.7 THOUSAND/UL (ref 0–1.22)
MONOCYTES # BLD AUTO: 20.76 THOUSAND/UL (ref 0–1.22)
MONOCYTES # BLD AUTO: 21.56 THOUSAND/UL (ref 0–1.22)
MONOCYTES # BLD AUTO: 28.94 THOUSAND/UL (ref 0–1.22)
MONOCYTES # BLD AUTO: 29 THOUSAND/UL (ref 0–1.22)
MONOCYTES # BLD AUTO: 3.59 THOUSAND/UL (ref 0–1.22)
MONOCYTES # BLD AUTO: 30.19 THOUSAND/UL (ref 0–1.22)
MONOCYTES # BLD AUTO: 34.79 THOUSAND/UL (ref 0–1.22)
MONOCYTES # BLD AUTO: 4.93 THOUSAND/UL (ref 0–1.22)
MONOCYTES # BLD AUTO: 48.26 THOUSAND/UL (ref 0–1.22)
MONOCYTES # BLD AUTO: 5.46 THOUSAND/UL (ref 0–1.22)
MONOCYTES # BLD AUTO: 52.16 THOUSAND/UL (ref 0–1.22)
MONOCYTES # BLD AUTO: 53.36 THOUSAND/UL (ref 0–1.22)
MONOCYTES # BLD AUTO: 58.01 THOUSAND/UL (ref 0–1.22)
MONOCYTES # BLD AUTO: 61.86 THOUSAND/UL (ref 0–1.22)
MONOCYTES # BLD AUTO: 68.46 THOUSAND/UL (ref 0–1.22)
MONOCYTES # BLD AUTO: 7.64 THOUSAND/UL (ref 0–1.22)
MONOCYTES # BLD AUTO: 85.08 THOUSAND/UL (ref 0–1.22)
MONOCYTES # BLD AUTO: 86.26 THOUSAND/UL (ref 0–1.22)
MONOCYTES NFR BLD: 14 % (ref 4–12)
MONOCYTES NFR BLD: 16 % (ref 4–12)
MONOCYTES NFR BLD: 25 % (ref 4–12)
MONOCYTES NFR BLD: 32 % (ref 4–12)
MONOCYTES NFR BLD: 41 % (ref 4–12)
MONOCYTES NFR BLD: 43 % (ref 4–12)
MONOCYTES NFR BLD: 57 % (ref 4–12)
MONOCYTES NFR BLD: 58 % (ref 4–12)
MONOCYTES NFR BLD: 60 % (ref 4–12)
MONOCYTES NFR BLD: 62 % (ref 4–12)
MONOCYTES NFR BLD: 63 % (ref 4–12)
MONOCYTES NFR BLD: 66 % (ref 4–12)
MONOCYTES NFR BLD: 68 % (ref 4–12)
MONOCYTES NFR BLD: 69 % (ref 4–12)
MONOCYTES NFR BLD: 69 % (ref 4–12)
MONOCYTES NFR BLD: 70 % (ref 4–12)
MONOCYTES NFR BLD: 72 % (ref 4–12)
MONOCYTES NFR BLD: 73 % (ref 4–12)
MONOCYTES NFR BLD: 84 % (ref 4–12)
NEUTROPHILS # BLD MANUAL: 0 THOUSAND/UL (ref 1.85–7.62)
NEUTROPHILS # BLD MANUAL: 0.22 THOUSAND/UL (ref 1.85–7.62)
NEUTROPHILS # BLD MANUAL: 0.25 THOUSAND/UL (ref 1.85–7.62)
NEUTROPHILS # BLD MANUAL: 0.35 THOUSAND/UL (ref 1.85–7.62)
NEUTROPHILS # BLD MANUAL: 0.44 THOUSAND/UL (ref 1.85–7.62)
NEUTROPHILS # BLD MANUAL: 0.73 THOUSAND/UL (ref 1.85–7.62)
NEUTROPHILS # BLD MANUAL: 0.79 THOUSAND/UL (ref 1.85–7.62)
NEUTROPHILS # BLD MANUAL: 0.92 THOUSAND/UL (ref 1.85–7.62)
NEUTROPHILS # BLD MANUAL: 1.12 THOUSAND/UL (ref 1.85–7.62)
NEUTS BAND NFR BLD MANUAL: 1 % (ref 0–8)
NEUTS SEG NFR BLD AUTO: 0 % (ref 43–75)
NEUTS SEG NFR BLD AUTO: 1 % (ref 43–75)
NEUTS SEG NFR BLD AUTO: 2 % (ref 43–75)
NEUTS SEG NFR BLD AUTO: 2 % (ref 43–75)
NITRITE UR QL STRIP: NEGATIVE
NON-SQ EPI CELLS URNS QL MICRO: ABNORMAL /HPF
NRBC BLD AUTO-RTO: 0 /100 WBCS
NRBC BLD AUTO-RTO: 1 /100 WBC (ref 0–2)
OVALOCYTES BLD QL SMEAR: PRESENT
P AXIS: 55 DEGREES
PH UR STRIP.AUTO: 7 [PH] (ref 4.5–8)
PHOSPHATE SERPL-MCNC: 1.6 MG/DL (ref 2.3–4.1)
PHOSPHATE SERPL-MCNC: 2.4 MG/DL (ref 2.3–4.1)
PHOSPHATE SERPL-MCNC: 2.5 MG/DL (ref 2.3–4.1)
PHOSPHATE SERPL-MCNC: 2.7 MG/DL (ref 2.3–4.1)
PHOSPHATE SERPL-MCNC: 3.2 MG/DL (ref 2.3–4.1)
PHOSPHATE SERPL-MCNC: 3.8 MG/DL (ref 2.3–4.1)
PHOSPHATE SERPL-MCNC: 3.8 MG/DL (ref 2.3–4.1)
PHOSPHATE SERPL-MCNC: 4.2 MG/DL (ref 2.3–4.1)
PLATELET # BLD AUTO: 17 THOUSANDS/UL (ref 149–390)
PLATELET # BLD AUTO: 19 THOUSANDS/UL (ref 149–390)
PLATELET # BLD AUTO: 20 THOUSANDS/UL (ref 149–390)
PLATELET # BLD AUTO: 21 THOUSANDS/UL (ref 149–390)
PLATELET # BLD AUTO: 23 THOUSANDS/UL (ref 149–390)
PLATELET # BLD AUTO: 25 THOUSANDS/UL (ref 149–390)
PLATELET # BLD AUTO: 25 THOUSANDS/UL (ref 149–390)
PLATELET # BLD AUTO: 26 THOUSANDS/UL (ref 149–390)
PLATELET # BLD AUTO: 26 THOUSANDS/UL (ref 149–390)
PLATELET # BLD AUTO: 27 THOUSANDS/UL (ref 149–390)
PLATELET # BLD AUTO: 27 THOUSANDS/UL (ref 149–390)
PLATELET # BLD AUTO: 28 THOUSANDS/UL (ref 149–390)
PLATELET # BLD AUTO: 30 THOUSANDS/UL (ref 149–390)
PLATELET # BLD AUTO: 31 THOUSANDS/UL (ref 149–390)
PLATELET # BLD AUTO: 32 THOUSANDS/UL (ref 149–390)
PLATELET # BLD AUTO: 32 THOUSANDS/UL (ref 149–390)
PLATELET # BLD AUTO: 39 THOUSANDS/UL (ref 149–390)
PLATELET # BLD AUTO: 45 THOUSANDS/UL (ref 149–390)
PLATELET # BLD AUTO: 46 THOUSANDS/UL (ref 149–390)
PLATELET # BLD AUTO: 52 THOUSANDS/UL (ref 149–390)
PLATELET BLD QL SMEAR: ABNORMAL
PMV BLD AUTO: 10 FL (ref 8.9–12.7)
PMV BLD AUTO: 10.1 FL (ref 8.9–12.7)
PMV BLD AUTO: 10.2 FL (ref 8.9–12.7)
PMV BLD AUTO: 10.2 FL (ref 8.9–12.7)
PMV BLD AUTO: 10.3 FL (ref 8.9–12.7)
PMV BLD AUTO: 10.3 FL (ref 8.9–12.7)
PMV BLD AUTO: 8.5 FL (ref 8.9–12.7)
PMV BLD AUTO: 9 FL (ref 8.9–12.7)
PMV BLD AUTO: 9.1 FL (ref 8.9–12.7)
PMV BLD AUTO: 9.1 FL (ref 8.9–12.7)
PMV BLD AUTO: 9.2 FL (ref 8.9–12.7)
PMV BLD AUTO: 9.4 FL (ref 8.9–12.7)
PMV BLD AUTO: 9.6 FL (ref 8.9–12.7)
PMV BLD AUTO: 9.7 FL (ref 8.9–12.7)
PMV BLD AUTO: 9.9 FL (ref 8.9–12.7)
POIKILOCYTOSIS BLD QL SMEAR: PRESENT
POLYCHROMASIA BLD QL SMEAR: PRESENT
POTASSIUM SERPL-SCNC: 3.6 MMOL/L (ref 3.5–5.3)
POTASSIUM SERPL-SCNC: 3.8 MMOL/L (ref 3.5–5.3)
POTASSIUM SERPL-SCNC: 3.9 MMOL/L (ref 3.5–5.3)
POTASSIUM SERPL-SCNC: 3.9 MMOL/L (ref 3.5–5.3)
POTASSIUM SERPL-SCNC: 4 MMOL/L (ref 3.5–5.3)
POTASSIUM SERPL-SCNC: 4.1 MMOL/L (ref 3.5–5.3)
POTASSIUM SERPL-SCNC: 4.2 MMOL/L (ref 3.5–5.3)
POTASSIUM SERPL-SCNC: 4.3 MMOL/L (ref 3.5–5.3)
POTASSIUM SERPL-SCNC: 4.3 MMOL/L (ref 3.5–5.3)
POTASSIUM SERPL-SCNC: 4.6 MMOL/L (ref 3.5–5.3)
PR INTERVAL: 162 MS
PROMYELOCYTES NFR BLD MANUAL: 4 % (ref 0–0)
PROMYELOCYTES NFR BLD MANUAL: 6 % (ref 0–0)
PROT SERPL-MCNC: 6.3 G/DL (ref 6.4–8.2)
PROT SERPL-MCNC: 6.8 G/DL (ref 6.4–8.2)
PROT SERPL-MCNC: 7.5 G/DL (ref 6.4–8.2)
PROT SERPL-MCNC: 7.6 G/DL (ref 6.4–8.2)
PROT SERPL-MCNC: 7.7 G/DL (ref 6.4–8.2)
PROT UR STRIP-MCNC: ABNORMAL MG/DL
PROTHROMBIN TIME: 16.5 SECONDS (ref 12.1–14.4)
PROTHROMBIN TIME: 16.9 SECONDS (ref 12.1–14.4)
PROTHROMBIN TIME: 17.1 SECONDS (ref 12.1–14.4)
QRS AXIS: -10 DEGREES
QRSD INTERVAL: 88 MS
QT INTERVAL: 362 MS
QTC INTERVAL: 442 MS
RBC # BLD AUTO: 2.06 MILLION/UL (ref 3.88–5.62)
RBC # BLD AUTO: 2.12 MILLION/UL (ref 3.88–5.62)
RBC # BLD AUTO: 2.16 MILLION/UL (ref 3.88–5.62)
RBC # BLD AUTO: 2.17 MILLION/UL (ref 3.88–5.62)
RBC # BLD AUTO: 2.2 MILLION/UL (ref 3.88–5.62)
RBC # BLD AUTO: 2.24 MILLION/UL (ref 3.88–5.62)
RBC # BLD AUTO: 2.3 MILLION/UL (ref 3.88–5.62)
RBC # BLD AUTO: 2.36 MILLION/UL (ref 3.88–5.62)
RBC # BLD AUTO: 2.36 MILLION/UL (ref 3.88–5.62)
RBC # BLD AUTO: 2.37 MILLION/UL (ref 3.88–5.62)
RBC # BLD AUTO: 2.51 MILLION/UL (ref 3.88–5.62)
RBC # BLD AUTO: 2.51 MILLION/UL (ref 3.88–5.62)
RBC # BLD AUTO: 2.54 MILLION/UL (ref 3.88–5.62)
RBC # BLD AUTO: 2.54 MILLION/UL (ref 3.88–5.62)
RBC # BLD AUTO: 2.56 MILLION/UL (ref 3.88–5.62)
RBC # BLD AUTO: 2.59 MILLION/UL (ref 3.88–5.62)
RBC # BLD AUTO: 2.6 MILLION/UL (ref 3.88–5.62)
RBC # BLD AUTO: 2.62 MILLION/UL (ref 3.88–5.62)
RBC # BLD AUTO: 2.64 MILLION/UL (ref 3.88–5.62)
RBC # BLD AUTO: 2.7 MILLION/UL (ref 3.88–5.62)
RBC #/AREA URNS AUTO: ABNORMAL /HPF
RBC MORPH BLD: PRESENT
RBC, URINE: NORMAL
RH BLD: POSITIVE
RHINOVIRUS RNA SPEC QL NAA+PROBE: NOT DETECTED
RSV A RNA SPEC QL NAA+PROBE: NOT DETECTED
RSV B RNA SPEC QL NAA+PROBE: NORMAL
RSV B RNA SPEC QL NAA+PROBE: NOT DETECTED
S PNEUM AG UR QL: NEGATIVE
SCAN RESULT: NORMAL
SCAN RESULT: NORMAL
SODIUM 24H UR-SCNC: 41 MOL/L
SODIUM SERPL-SCNC: 130 MMOL/L (ref 136–145)
SODIUM SERPL-SCNC: 134 MMOL/L (ref 136–145)
SODIUM SERPL-SCNC: 135 MMOL/L (ref 136–145)
SODIUM SERPL-SCNC: 136 MMOL/L (ref 136–145)
SODIUM SERPL-SCNC: 137 MMOL/L (ref 136–145)
SODIUM SERPL-SCNC: 139 MMOL/L (ref 136–145)
SODIUM SERPL-SCNC: 139 MMOL/L (ref 136–145)
SP GR UR STRIP.AUTO: 1.01 (ref 1–1.03)
SPECIMEN EXPIRATION DATE: NORMAL
T WAVE AXIS: 47 DEGREES
TIBC SERPL-MCNC: 148 UG/DL (ref 250–450)
TOTAL CELLS COUNTED SPEC: 100
TRANSFUSION STATUS PATIENT QL: NORMAL
TRIGL SERPL-MCNC: 77 MG/DL
UNIT DISPENSE STATUS: NORMAL
UNIT PRODUCT CODE: NORMAL
UNIT RH: NORMAL
URATE SERPL-MCNC: 4.6 MG/DL (ref 4.2–8)
URATE SERPL-MCNC: 5.4 MG/DL (ref 4.2–8)
URATE SERPL-MCNC: 6.2 MG/DL (ref 4.2–8)
URATE SERPL-MCNC: 7.3 MG/DL (ref 4.2–8)
URATE SERPL-MCNC: 7.6 MG/DL (ref 4.2–8)
UROBILINOGEN UR QL STRIP.AUTO: 0.2 E.U./DL
VANCOMYCIN TROUGH SERPL-MCNC: 8.9 UG/ML (ref 10–20)
VARIANT LYMPHS # BLD AUTO: 1 %
VARIANT LYMPHS # BLD AUTO: 12 %
VARIANT LYMPHS # BLD AUTO: 13 %
VARIANT LYMPHS # BLD AUTO: 13 %
VARIANT LYMPHS # BLD AUTO: 17 %
VARIANT LYMPHS # BLD AUTO: 2 %
VARIANT LYMPHS # BLD AUTO: 20 %
VARIANT LYMPHS # BLD AUTO: 23 %
VARIANT LYMPHS # BLD AUTO: 4 %
VARIANT LYMPHS # BLD AUTO: 5 %
VARIANT LYMPHS # BLD AUTO: 6 %
VARIANT LYMPHS # BLD AUTO: 6 %
VARIANT LYMPHS # BLD AUTO: 7 %
VARIANT LYMPHS # BLD AUTO: 8 %
VENTRICULAR RATE: 90 BPM
WBC # BLD AUTO: 102.69 THOUSAND/UL (ref 4.31–10.16)
WBC # BLD AUTO: 106.65 THOUSAND/UL (ref 4.31–10.16)
WBC # BLD AUTO: 12.69 THOUSAND/UL (ref 4.31–10.16)
WBC # BLD AUTO: 123.3 THOUSAND/UL (ref 4.31–10.16)
WBC # BLD AUTO: 22.46 THOUSAND/UL (ref 4.31–10.16)
WBC # BLD AUTO: 24.75 THOUSAND/UL (ref 4.31–10.16)
WBC # BLD AUTO: 27.84 THOUSAND/UL (ref 4.31–10.16)
WBC # BLD AUTO: 29.95 THOUSAND/UL (ref 4.31–10.16)
WBC # BLD AUTO: 30.57 THOUSAND/UL (ref 4.31–10.16)
WBC # BLD AUTO: 35.23 THOUSAND/UL (ref 4.31–10.16)
WBC # BLD AUTO: 36.42 THOUSAND/UL (ref 4.31–10.16)
WBC # BLD AUTO: 42.03 THOUSAND/UL (ref 4.31–10.16)
WBC # BLD AUTO: 42.27 THOUSAND/UL (ref 4.31–10.16)
WBC # BLD AUTO: 44.4 THOUSAND/UL (ref 4.31–10.16)
WBC # BLD AUTO: 50.77 THOUSAND/UL (ref 4.31–10.16)
WBC # BLD AUTO: 56.11 THOUSAND/UL (ref 4.31–10.16)
WBC # BLD AUTO: 76.61 THOUSAND/UL (ref 4.31–10.16)
WBC # BLD AUTO: 79.46 THOUSAND/UL (ref 4.31–10.16)
WBC # BLD AUTO: 80.85 THOUSAND/UL (ref 4.31–10.16)
WBC # BLD AUTO: 90.45 THOUSAND/UL (ref 4.31–10.16)
WBC # BLD AUTO: 91.5 THOUSAND/UL (ref 4.31–10.16)
WBC # BLD AUTO: 97.8 THOUSAND/UL (ref 4.31–10.16)
WBC #/AREA URNS AUTO: ABNORMAL /HPF

## 2018-01-01 PROCEDURE — 99222 1ST HOSP IP/OBS MODERATE 55: CPT | Performed by: INTERNAL MEDICINE

## 2018-01-01 PROCEDURE — 88305 TISSUE EXAM BY PATHOLOGIST: CPT | Performed by: INTERNAL MEDICINE

## 2018-01-01 PROCEDURE — 99232 SBSQ HOSP IP/OBS MODERATE 35: CPT | Performed by: INTERNAL MEDICINE

## 2018-01-01 PROCEDURE — P9040 RBC LEUKOREDUCED IRRADIATED: HCPCS

## 2018-01-01 PROCEDURE — 84100 ASSAY OF PHOSPHORUS: CPT | Performed by: INTERNAL MEDICINE

## 2018-01-01 PROCEDURE — 38222 DX BONE MARROW BX & ASPIR: CPT

## 2018-01-01 PROCEDURE — 85007 BL SMEAR W/DIFF WBC COUNT: CPT | Performed by: INTERNAL MEDICINE

## 2018-01-01 PROCEDURE — 85027 COMPLETE CBC AUTOMATED: CPT | Performed by: INTERNAL MEDICINE

## 2018-01-01 PROCEDURE — 80202 ASSAY OF VANCOMYCIN: CPT | Performed by: INTERNAL MEDICINE

## 2018-01-01 PROCEDURE — 80053 COMPREHEN METABOLIC PANEL: CPT | Performed by: INTERNAL MEDICINE

## 2018-01-01 PROCEDURE — 81003 URINALYSIS AUTO W/O SCOPE: CPT | Performed by: INTERNAL MEDICINE

## 2018-01-01 PROCEDURE — 85730 THROMBOPLASTIN TIME PARTIAL: CPT | Performed by: PHYSICIAN ASSISTANT

## 2018-01-01 PROCEDURE — 86900 BLOOD TYPING SEROLOGIC ABO: CPT | Performed by: INTERNAL MEDICINE

## 2018-01-01 PROCEDURE — 80048 BASIC METABOLIC PNL TOTAL CA: CPT | Performed by: INTERNAL MEDICINE

## 2018-01-01 PROCEDURE — 86901 BLOOD TYPING SEROLOGIC RH(D): CPT | Performed by: EMERGENCY MEDICINE

## 2018-01-01 PROCEDURE — 70220 X-RAY EXAM OF SINUSES: CPT

## 2018-01-01 PROCEDURE — 71046 X-RAY EXAM CHEST 2 VIEWS: CPT

## 2018-01-01 PROCEDURE — 30233R1 TRANSFUSION OF NONAUTOLOGOUS PLATELETS INTO PERIPHERAL VEIN, PERCUTANEOUS APPROACH: ICD-10-PCS | Performed by: INTERNAL MEDICINE

## 2018-01-01 PROCEDURE — 86901 BLOOD TYPING SEROLOGIC RH(D): CPT | Performed by: INTERNAL MEDICINE

## 2018-01-01 PROCEDURE — 88342 IMHCHEM/IMCYTCHM 1ST ANTB: CPT | Performed by: INTERNAL MEDICINE

## 2018-01-01 PROCEDURE — 88374 M/PHMTRC ALYS ISHQUANT/SEMIQ: CPT | Performed by: SPECIALIST

## 2018-01-01 PROCEDURE — 84550 ASSAY OF BLOOD/URIC ACID: CPT | Performed by: INTERNAL MEDICINE

## 2018-01-01 PROCEDURE — 88313 SPECIAL STAINS GROUP 2: CPT | Performed by: PATHOLOGY

## 2018-01-01 PROCEDURE — 80076 HEPATIC FUNCTION PANEL: CPT | Performed by: INTERNAL MEDICINE

## 2018-01-01 PROCEDURE — 85027 COMPLETE CBC AUTOMATED: CPT | Performed by: PHYSICIAN ASSISTANT

## 2018-01-01 PROCEDURE — 99214 OFFICE O/P EST MOD 30 MIN: CPT | Performed by: FAMILY MEDICINE

## 2018-01-01 PROCEDURE — 88184 FLOWCYTOMETRY/ TC 1 MARKER: CPT | Performed by: INTERNAL MEDICINE

## 2018-01-01 PROCEDURE — 85097 BONE MARROW INTERPRETATION: CPT | Performed by: INTERNAL MEDICINE

## 2018-01-01 PROCEDURE — 88333 PATH CONSLTJ SURG CYTO XM 1: CPT | Performed by: PATHOLOGY

## 2018-01-01 PROCEDURE — 88341 IMHCHEM/IMCYTCHM EA ADD ANTB: CPT | Performed by: PATHOLOGY

## 2018-01-01 PROCEDURE — 87205 SMEAR GRAM STAIN: CPT | Performed by: INTERNAL MEDICINE

## 2018-01-01 PROCEDURE — 93010 ELECTROCARDIOGRAM REPORT: CPT | Performed by: INTERNAL MEDICINE

## 2018-01-01 PROCEDURE — 99233 SBSQ HOSP IP/OBS HIGH 50: CPT | Performed by: INTERNAL MEDICINE

## 2018-01-01 PROCEDURE — 88341 IMHCHEM/IMCYTCHM EA ADD ANTB: CPT | Performed by: SPECIALIST

## 2018-01-01 PROCEDURE — 87633 RESP VIRUS 12-25 TARGETS: CPT | Performed by: INTERNAL MEDICINE

## 2018-01-01 PROCEDURE — 81015 MICROSCOPIC EXAM OF URINE: CPT | Performed by: INTERNAL MEDICINE

## 2018-01-01 PROCEDURE — 81275 KRAS GENE VARIANTS EXON 2: CPT | Performed by: INTERNAL MEDICINE

## 2018-01-01 PROCEDURE — 88305 TISSUE EXAM BY PATHOLOGIST: CPT | Performed by: SPECIALIST

## 2018-01-01 PROCEDURE — 85014 HEMATOCRIT: CPT | Performed by: INTERNAL MEDICINE

## 2018-01-01 PROCEDURE — 87077 CULTURE AEROBIC IDENTIFY: CPT | Performed by: INTERNAL MEDICINE

## 2018-01-01 PROCEDURE — 36415 COLL VENOUS BLD VENIPUNCTURE: CPT | Performed by: PHYSICIAN ASSISTANT

## 2018-01-01 PROCEDURE — 86900 BLOOD TYPING SEROLOGIC ABO: CPT | Performed by: EMERGENCY MEDICINE

## 2018-01-01 PROCEDURE — 83735 ASSAY OF MAGNESIUM: CPT | Performed by: PHYSICIAN ASSISTANT

## 2018-01-01 PROCEDURE — 87070 CULTURE OTHR SPECIMN AEROBIC: CPT | Performed by: INTERNAL MEDICINE

## 2018-01-01 PROCEDURE — 85018 HEMOGLOBIN: CPT | Performed by: INTERNAL MEDICINE

## 2018-01-01 PROCEDURE — 99239 HOSP IP/OBS DSCHRG MGMT >30: CPT | Performed by: INTERNAL MEDICINE

## 2018-01-01 PROCEDURE — 83735 ASSAY OF MAGNESIUM: CPT | Performed by: INTERNAL MEDICINE

## 2018-01-01 PROCEDURE — RECHECK: Performed by: INTERNAL MEDICINE

## 2018-01-01 PROCEDURE — 85007 BL SMEAR W/DIFF WBC COUNT: CPT | Performed by: PHYSICIAN ASSISTANT

## 2018-01-01 PROCEDURE — 85610 PROTHROMBIN TIME: CPT | Performed by: PHYSICIAN ASSISTANT

## 2018-01-01 PROCEDURE — 99223 1ST HOSP IP/OBS HIGH 75: CPT | Performed by: PHYSICIAN ASSISTANT

## 2018-01-01 PROCEDURE — 84550 ASSAY OF BLOOD/URIC ACID: CPT | Performed by: PHYSICIAN ASSISTANT

## 2018-01-01 PROCEDURE — 99223 1ST HOSP IP/OBS HIGH 75: CPT | Performed by: INTERNAL MEDICINE

## 2018-01-01 PROCEDURE — 1101F PT FALLS ASSESS-DOCD LE1/YR: CPT | Performed by: FAMILY MEDICINE

## 2018-01-01 PROCEDURE — 80053 COMPREHEN METABOLIC PANEL: CPT | Performed by: PHYSICIAN ASSISTANT

## 2018-01-01 PROCEDURE — 88305 TISSUE EXAM BY PATHOLOGIST: CPT | Performed by: PATHOLOGY

## 2018-01-01 PROCEDURE — 80061 LIPID PANEL: CPT | Performed by: INTERNAL MEDICINE

## 2018-01-01 PROCEDURE — 86920 COMPATIBILITY TEST SPIN: CPT

## 2018-01-01 PROCEDURE — 93005 ELECTROCARDIOGRAM TRACING: CPT | Performed by: EMERGENCY MEDICINE

## 2018-01-01 PROCEDURE — 77012 CT SCAN FOR NEEDLE BIOPSY: CPT | Performed by: RADIOLOGY

## 2018-01-01 PROCEDURE — 76536 US EXAM OF HEAD AND NECK: CPT

## 2018-01-01 PROCEDURE — 36415 COLL VENOUS BLD VENIPUNCTURE: CPT

## 2018-01-01 PROCEDURE — 87040 BLOOD CULTURE FOR BACTERIA: CPT | Performed by: INTERNAL MEDICINE

## 2018-01-01 PROCEDURE — G8987 SELF CARE CURRENT STATUS: HCPCS

## 2018-01-01 PROCEDURE — 87040 BLOOD CULTURE FOR BACTERIA: CPT | Performed by: EMERGENCY MEDICINE

## 2018-01-01 PROCEDURE — 88185 FLOWCYTOMETRY/TC ADD-ON: CPT

## 2018-01-01 PROCEDURE — 83036 HEMOGLOBIN GLYCOSYLATED A1C: CPT | Performed by: INTERNAL MEDICINE

## 2018-01-01 PROCEDURE — 88342 IMHCHEM/IMCYTCHM 1ST ANTB: CPT | Performed by: PATHOLOGY

## 2018-01-01 PROCEDURE — 38222 DX BONE MARROW BX & ASPIR: CPT | Performed by: RADIOLOGY

## 2018-01-01 PROCEDURE — 30233N1 TRANSFUSION OF NONAUTOLOGOUS RED BLOOD CELLS INTO PERIPHERAL VEIN, PERCUTANEOUS APPROACH: ICD-10-PCS | Performed by: HOSPITALIST

## 2018-01-01 PROCEDURE — 86923 COMPATIBILITY TEST ELECTRIC: CPT

## 2018-01-01 PROCEDURE — P9037 PLATE PHERES LEUKOREDU IRRAD: HCPCS

## 2018-01-01 PROCEDURE — 0HB6XZX EXCISION OF BACK SKIN, EXTERNAL APPROACH, DIAGNOSTIC: ICD-10-PCS | Performed by: SPECIALIST

## 2018-01-01 PROCEDURE — 70450 CT HEAD/BRAIN W/O DYE: CPT

## 2018-01-01 PROCEDURE — 80053 COMPREHEN METABOLIC PANEL: CPT | Performed by: EMERGENCY MEDICINE

## 2018-01-01 PROCEDURE — 86850 RBC ANTIBODY SCREEN: CPT | Performed by: INTERNAL MEDICINE

## 2018-01-01 PROCEDURE — 77012 CT SCAN FOR NEEDLE BIOPSY: CPT

## 2018-01-01 PROCEDURE — 96361 HYDRATE IV INFUSION ADD-ON: CPT

## 2018-01-01 PROCEDURE — 83615 LACTATE (LD) (LDH) ENZYME: CPT | Performed by: PHYSICIAN ASSISTANT

## 2018-01-01 PROCEDURE — 87798 DETECT AGENT NOS DNA AMP: CPT | Performed by: EMERGENCY MEDICINE

## 2018-01-01 PROCEDURE — 82570 ASSAY OF URINE CREATININE: CPT | Performed by: PHYSICIAN ASSISTANT

## 2018-01-01 PROCEDURE — 99222 1ST HOSP IP/OBS MODERATE 55: CPT | Performed by: STUDENT IN AN ORGANIZED HEALTH CARE EDUCATION/TRAINING PROGRAM

## 2018-01-01 PROCEDURE — 85384 FIBRINOGEN ACTIVITY: CPT | Performed by: INTERNAL MEDICINE

## 2018-01-01 PROCEDURE — G8979 MOBILITY GOAL STATUS: HCPCS

## 2018-01-01 PROCEDURE — 88237 TISSUE CULTURE BONE MARROW: CPT | Performed by: INTERNAL MEDICINE

## 2018-01-01 PROCEDURE — 83605 ASSAY OF LACTIC ACID: CPT | Performed by: INTERNAL MEDICINE

## 2018-01-01 PROCEDURE — 88342 IMHCHEM/IMCYTCHM 1ST ANTB: CPT | Performed by: SPECIALIST

## 2018-01-01 PROCEDURE — 3725F SCREEN DEPRESSION PERFORMED: CPT | Performed by: FAMILY MEDICINE

## 2018-01-01 PROCEDURE — 88311 DECALCIFY TISSUE: CPT | Performed by: PATHOLOGY

## 2018-01-01 PROCEDURE — 86880 COOMBS TEST DIRECT: CPT | Performed by: INTERNAL MEDICINE

## 2018-01-01 PROCEDURE — 97167 OT EVAL HIGH COMPLEX 60 MIN: CPT

## 2018-01-01 PROCEDURE — G8988 SELF CARE GOAL STATUS: HCPCS

## 2018-01-01 PROCEDURE — 99231 SBSQ HOSP IP/OBS SF/LOW 25: CPT | Performed by: INTERNAL MEDICINE

## 2018-01-01 PROCEDURE — 07DR3ZX EXTRACTION OF ILIAC BONE MARROW, PERCUTANEOUS APPROACH, DIAGNOSTIC: ICD-10-PCS | Performed by: RADIOLOGY

## 2018-01-01 PROCEDURE — 86850 RBC ANTIBODY SCREEN: CPT | Performed by: EMERGENCY MEDICINE

## 2018-01-01 PROCEDURE — 85097 BONE MARROW INTERPRETATION: CPT | Performed by: PATHOLOGY

## 2018-01-01 PROCEDURE — 83605 ASSAY OF LACTIC ACID: CPT | Performed by: PHYSICIAN ASSISTANT

## 2018-01-01 PROCEDURE — 99239 HOSP IP/OBS DSCHRG MGMT >30: CPT | Performed by: HOSPITALIST

## 2018-01-01 PROCEDURE — 83605 ASSAY OF LACTIC ACID: CPT | Performed by: EMERGENCY MEDICINE

## 2018-01-01 PROCEDURE — 88262 CHROMOSOME ANALYSIS 15-20: CPT | Performed by: INTERNAL MEDICINE

## 2018-01-01 PROCEDURE — 88341 IMHCHEM/IMCYTCHM EA ADD ANTB: CPT | Performed by: INTERNAL MEDICINE

## 2018-01-01 PROCEDURE — 88333 PATH CONSLTJ SURG CYTO XM 1: CPT | Performed by: INTERNAL MEDICINE

## 2018-01-01 PROCEDURE — 85027 COMPLETE CBC AUTOMATED: CPT | Performed by: EMERGENCY MEDICINE

## 2018-01-01 PROCEDURE — 99285 EMERGENCY DEPT VISIT HI MDM: CPT

## 2018-01-01 PROCEDURE — 84100 ASSAY OF PHOSPHORUS: CPT | Performed by: PHYSICIAN ASSISTANT

## 2018-01-01 PROCEDURE — 88189 FLOWCYTOMETRY/READ 16 & >: CPT | Performed by: INTERNAL MEDICINE

## 2018-01-01 PROCEDURE — 81479 UNLISTED MOLECULAR PATHOLOGY: CPT

## 2018-01-01 PROCEDURE — 81001 URINALYSIS AUTO W/SCOPE: CPT

## 2018-01-01 PROCEDURE — 88311 DECALCIFY TISSUE: CPT | Performed by: INTERNAL MEDICINE

## 2018-01-01 PROCEDURE — 30233N1 TRANSFUSION OF NONAUTOLOGOUS RED BLOOD CELLS INTO PERIPHERAL VEIN, PERCUTANEOUS APPROACH: ICD-10-PCS | Performed by: INTERNAL MEDICINE

## 2018-01-01 PROCEDURE — 83550 IRON BINDING TEST: CPT | Performed by: INTERNAL MEDICINE

## 2018-01-01 PROCEDURE — 87186 SC STD MICRODIL/AGAR DIL: CPT | Performed by: INTERNAL MEDICINE

## 2018-01-01 PROCEDURE — 99233 SBSQ HOSP IP/OBS HIGH 50: CPT | Performed by: NURSE PRACTITIONER

## 2018-01-01 PROCEDURE — 88184 FLOWCYTOMETRY/ TC 1 MARKER: CPT | Performed by: PHYSICIAN ASSISTANT

## 2018-01-01 PROCEDURE — 96360 HYDRATION IV INFUSION INIT: CPT

## 2018-01-01 PROCEDURE — 97163 PT EVAL HIGH COMPLEX 45 MIN: CPT

## 2018-01-01 PROCEDURE — 87449 NOS EACH ORGANISM AG IA: CPT | Performed by: PHYSICIAN ASSISTANT

## 2018-01-01 PROCEDURE — 81002 URINALYSIS NONAUTO W/O SCOPE: CPT | Performed by: EMERGENCY MEDICINE

## 2018-01-01 PROCEDURE — 3E03305 INTRODUCTION OF OTHER ANTINEOPLASTIC INTO PERIPHERAL VEIN, PERCUTANEOUS APPROACH: ICD-10-PCS | Performed by: INTERNAL MEDICINE

## 2018-01-01 PROCEDURE — 88377 M/PHMTRC ALYS ISHQUANT/SEMIQ: CPT

## 2018-01-01 PROCEDURE — 85007 BL SMEAR W/DIFF WBC COUNT: CPT | Performed by: EMERGENCY MEDICINE

## 2018-01-01 PROCEDURE — 81403 MOPATH PROCEDURE LEVEL 4: CPT

## 2018-01-01 PROCEDURE — G8978 MOBILITY CURRENT STATUS: HCPCS

## 2018-01-01 PROCEDURE — 4040F PNEUMOC VAC/ADMIN/RCVD: CPT | Performed by: FAMILY MEDICINE

## 2018-01-01 PROCEDURE — 85610 PROTHROMBIN TIME: CPT | Performed by: INTERNAL MEDICINE

## 2018-01-01 PROCEDURE — 85362 FIBRIN DEGRADATION PRODUCTS: CPT | Performed by: INTERNAL MEDICINE

## 2018-01-01 PROCEDURE — 81450 HL NEO GSAP 5-50DNA/DNA&RNA: CPT | Performed by: INTERNAL MEDICINE

## 2018-01-01 PROCEDURE — 84300 ASSAY OF URINE SODIUM: CPT | Performed by: PHYSICIAN ASSISTANT

## 2018-01-01 PROCEDURE — 97535 SELF CARE MNGMENT TRAINING: CPT

## 2018-01-01 PROCEDURE — 81276 KRAS GENE ADDL VARIANTS: CPT

## 2018-01-01 PROCEDURE — 88313 SPECIAL STAINS GROUP 2: CPT | Performed by: INTERNAL MEDICINE

## 2018-01-01 PROCEDURE — 82728 ASSAY OF FERRITIN: CPT | Performed by: INTERNAL MEDICINE

## 2018-01-01 PROCEDURE — 1160F RVW MEDS BY RX/DR IN RCRD: CPT | Performed by: FAMILY MEDICINE

## 2018-01-01 PROCEDURE — 83540 ASSAY OF IRON: CPT | Performed by: INTERNAL MEDICINE

## 2018-01-01 RX ORDER — DOCUSATE SODIUM 100 MG/1
100 CAPSULE, LIQUID FILLED ORAL 2 TIMES DAILY
Status: DISCONTINUED | OUTPATIENT
Start: 2018-01-01 | End: 2018-01-01 | Stop reason: HOSPADM

## 2018-01-01 RX ORDER — BACLOFEN 10 MG/1
5 TABLET ORAL 2 TIMES DAILY
Status: DISCONTINUED | OUTPATIENT
Start: 2018-01-01 | End: 2018-01-01

## 2018-01-01 RX ORDER — ACYCLOVIR 200 MG/1
400 CAPSULE ORAL EVERY 12 HOURS SCHEDULED
Status: DISCONTINUED | OUTPATIENT
Start: 2018-01-01 | End: 2018-01-01 | Stop reason: HOSPADM

## 2018-01-01 RX ORDER — CHLORPROMAZINE HYDROCHLORIDE 25 MG/1
25 TABLET, FILM COATED ORAL EVERY 6 HOURS PRN
Qty: 30 TABLET | Refills: 0 | Status: SHIPPED | OUTPATIENT
Start: 2018-01-01

## 2018-01-01 RX ORDER — ACETAMINOPHEN 325 MG/1
650 TABLET ORAL EVERY 6 HOURS PRN
Status: DISCONTINUED | OUTPATIENT
Start: 2018-01-01 | End: 2018-01-01 | Stop reason: HOSPADM

## 2018-01-01 RX ORDER — MORPHINE SULFATE 20 MG/ML
5 SOLUTION ORAL EVERY 2 HOUR PRN
Status: DISCONTINUED | OUTPATIENT
Start: 2018-01-01 | End: 2018-01-01 | Stop reason: HOSPADM

## 2018-01-01 RX ORDER — LORAZEPAM 0.5 MG/1
0.5 TABLET ORAL EVERY 2 HOUR PRN
Status: DISCONTINUED | OUTPATIENT
Start: 2018-01-01 | End: 2018-01-01 | Stop reason: HOSPADM

## 2018-01-01 RX ORDER — PREDNISONE 20 MG/1
TABLET ORAL
Qty: 20 TABLET | Refills: 0 | Status: SHIPPED | OUTPATIENT
Start: 2018-01-01 | End: 2018-01-01

## 2018-01-01 RX ORDER — ACETAMINOPHEN 325 MG/1
650 TABLET ORAL EVERY 6 HOURS PRN
Status: CANCELLED | OUTPATIENT
Start: 2018-01-01

## 2018-01-01 RX ORDER — SULFAMETHOXAZOLE AND TRIMETHOPRIM 800; 160 MG/1; MG/1
1 TABLET ORAL EVERY 12 HOURS SCHEDULED
Qty: 20 TABLET | Refills: 0 | Status: SHIPPED | OUTPATIENT
Start: 2018-01-01 | End: 2018-01-01

## 2018-01-01 RX ORDER — CEPHALEXIN 500 MG/1
500 CAPSULE ORAL EVERY 12 HOURS SCHEDULED
Status: DISCONTINUED | OUTPATIENT
Start: 2018-01-01 | End: 2018-01-01

## 2018-01-01 RX ORDER — HYDROXYZINE HYDROCHLORIDE 25 MG/1
25 TABLET, FILM COATED ORAL EVERY 6 HOURS PRN
Status: DISCONTINUED | OUTPATIENT
Start: 2018-01-01 | End: 2018-01-01

## 2018-01-01 RX ORDER — ACYCLOVIR 200 MG/1
400 CAPSULE ORAL EVERY 12 HOURS SCHEDULED
Qty: 60 CAPSULE | Refills: 0 | Status: SHIPPED | OUTPATIENT
Start: 2018-01-01 | End: 2018-01-01 | Stop reason: HOSPADM

## 2018-01-01 RX ORDER — POLYETHYLENE GLYCOL 3350 17 G/17G
17 POWDER, FOR SOLUTION ORAL DAILY
Status: DISCONTINUED | OUTPATIENT
Start: 2018-01-01 | End: 2018-01-01 | Stop reason: HOSPADM

## 2018-01-01 RX ORDER — BACLOFEN 10 MG/1
5 TABLET ORAL 2 TIMES DAILY PRN
Status: DISCONTINUED | OUTPATIENT
Start: 2018-01-01 | End: 2018-01-01 | Stop reason: HOSPADM

## 2018-01-01 RX ORDER — VANCOMYCIN HYDROCHLORIDE 1 G/200ML
12.5 INJECTION, SOLUTION INTRAVENOUS EVERY 12 HOURS
Status: DISCONTINUED | OUTPATIENT
Start: 2018-01-01 | End: 2018-01-01

## 2018-01-01 RX ORDER — TRIAMCINOLONE ACETONIDE 1 MG/G
CREAM TOPICAL
Refills: 1 | COMMUNITY
Start: 2017-01-01 | End: 2018-01-01

## 2018-01-01 RX ORDER — BISACODYL 10 MG
10 SUPPOSITORY, RECTAL RECTAL DAILY PRN
Qty: 12 SUPPOSITORY | Refills: 0 | Status: SHIPPED | OUTPATIENT
Start: 2018-01-01

## 2018-01-01 RX ORDER — ALLOPURINOL 100 MG/1
100 TABLET ORAL DAILY
Status: DISCONTINUED | OUTPATIENT
Start: 2018-01-01 | End: 2018-01-01 | Stop reason: HOSPADM

## 2018-01-01 RX ORDER — LORAZEPAM 0.5 MG/1
0.5 TABLET ORAL EVERY 2 HOUR PRN
Qty: 30 TABLET | Refills: 0 | Status: SHIPPED | OUTPATIENT
Start: 2018-01-01 | End: 2018-03-19

## 2018-01-01 RX ORDER — GUAIFENESIN 600 MG
600 TABLET, EXTENDED RELEASE 12 HR ORAL EVERY 12 HOURS SCHEDULED
Status: DISCONTINUED | OUTPATIENT
Start: 2018-01-01 | End: 2018-01-01 | Stop reason: HOSPADM

## 2018-01-01 RX ORDER — MORPHINE SULFATE 20 MG/ML
5 SOLUTION ORAL EVERY 2 HOUR PRN
Qty: 30 ML | Refills: 0 | Status: CANCELLED | OUTPATIENT
Start: 2018-01-01

## 2018-01-01 RX ORDER — BISACODYL 10 MG
10 SUPPOSITORY, RECTAL RECTAL DAILY PRN
Status: DISCONTINUED | OUTPATIENT
Start: 2018-01-01 | End: 2018-01-01 | Stop reason: HOSPADM

## 2018-01-01 RX ORDER — CHLORAL HYDRATE 500 MG
1 CAPSULE ORAL
COMMUNITY
End: 2018-01-01 | Stop reason: HOSPADM

## 2018-01-01 RX ORDER — DOCUSATE SODIUM 100 MG/1
100 CAPSULE, LIQUID FILLED ORAL 2 TIMES DAILY
Qty: 10 CAPSULE | Refills: 0 | Status: SHIPPED | OUTPATIENT
Start: 2018-01-01

## 2018-01-01 RX ORDER — CHLORPROMAZINE HYDROCHLORIDE 25 MG/1
25 TABLET, FILM COATED ORAL ONCE
Status: COMPLETED | OUTPATIENT
Start: 2018-01-01 | End: 2018-01-01

## 2018-01-01 RX ORDER — BACLOFEN 10 MG/1
5 TABLET ORAL 3 TIMES DAILY
Status: DISCONTINUED | OUTPATIENT
Start: 2018-01-01 | End: 2018-01-01

## 2018-01-01 RX ORDER — ALLOPURINOL 100 MG/1
100 TABLET ORAL DAILY
Qty: 30 TABLET | Refills: 0 | Status: SHIPPED | OUTPATIENT
Start: 2018-01-01

## 2018-01-01 RX ORDER — ACETAMINOPHEN 325 MG/1
650 TABLET ORAL ONCE
Status: COMPLETED | OUTPATIENT
Start: 2018-01-01 | End: 2018-01-01

## 2018-01-01 RX ORDER — LEVOFLOXACIN 500 MG/1
500 TABLET, FILM COATED ORAL EVERY 24 HOURS
Qty: 30 TABLET | Refills: 0 | Status: SHIPPED | OUTPATIENT
Start: 2018-01-01 | End: 2018-01-01

## 2018-01-01 RX ORDER — SODIUM CHLORIDE 9 MG/ML
75 INJECTION, SOLUTION INTRAVENOUS CONTINUOUS
Status: DISCONTINUED | OUTPATIENT
Start: 2018-01-01 | End: 2018-01-01

## 2018-01-01 RX ORDER — HYDROXYZINE HYDROCHLORIDE 10 MG/1
10 TABLET, FILM COATED ORAL 3 TIMES DAILY PRN
Status: DISCONTINUED | OUTPATIENT
Start: 2018-01-01 | End: 2018-01-01 | Stop reason: HOSPADM

## 2018-01-01 RX ORDER — VANCOMYCIN HYDROCHLORIDE 1 G/200ML
15 INJECTION, SOLUTION INTRAVENOUS EVERY 12 HOURS
Status: DISCONTINUED | OUTPATIENT
Start: 2018-01-01 | End: 2018-01-01

## 2018-01-01 RX ORDER — AZELASTINE 1 MG/ML
2 SPRAY, METERED NASAL 2 TIMES DAILY
COMMUNITY
Start: 2018-01-01 | End: 2018-01-01

## 2018-01-01 RX ORDER — LEVOFLOXACIN 500 MG/1
500 TABLET, FILM COATED ORAL EVERY 24 HOURS
Status: DISCONTINUED | OUTPATIENT
Start: 2018-01-01 | End: 2018-01-01

## 2018-01-01 RX ORDER — HALOPERIDOL 0.5 MG/1
0.5 TABLET ORAL EVERY 2 HOUR PRN
Status: DISCONTINUED | OUTPATIENT
Start: 2018-01-01 | End: 2018-01-01 | Stop reason: HOSPADM

## 2018-01-01 RX ORDER — HALOPERIDOL 0.5 MG/1
0.5 TABLET ORAL EVERY 2 HOUR PRN
Qty: 30 TABLET | Refills: 0 | Status: SHIPPED | OUTPATIENT
Start: 2018-01-01

## 2018-01-01 RX ORDER — LORAZEPAM 0.5 MG/1
0.5 TABLET ORAL EVERY 2 HOUR PRN
Qty: 30 TABLET | Refills: 0 | Status: CANCELLED | OUTPATIENT
Start: 2018-01-01

## 2018-01-01 RX ORDER — HYDROXYZINE HYDROCHLORIDE 25 MG/1
25 TABLET, FILM COATED ORAL EVERY 6 HOURS PRN
Status: DISCONTINUED | OUTPATIENT
Start: 2018-01-01 | End: 2018-01-01 | Stop reason: HOSPADM

## 2018-01-01 RX ORDER — FENTANYL CITRATE 50 UG/ML
INJECTION, SOLUTION INTRAMUSCULAR; INTRAVENOUS CODE/TRAUMA/SEDATION MEDICATION
Status: COMPLETED | OUTPATIENT
Start: 2018-01-01 | End: 2018-01-01

## 2018-01-01 RX ORDER — CHLORPROMAZINE HYDROCHLORIDE 25 MG/1
25 TABLET, FILM COATED ORAL EVERY 6 HOURS PRN
Qty: 30 TABLET | Refills: 0 | Status: SHIPPED | OUTPATIENT
Start: 2018-01-01 | End: 2018-01-01

## 2018-01-01 RX ORDER — CHLORPROMAZINE HYDROCHLORIDE 25 MG/1
25 TABLET, FILM COATED ORAL EVERY 6 HOURS PRN
Status: DISCONTINUED | OUTPATIENT
Start: 2018-01-01 | End: 2018-01-01 | Stop reason: HOSPADM

## 2018-01-01 RX ORDER — TRIAMCINOLONE ACETONIDE 1 MG/G
CREAM TOPICAL 2 TIMES DAILY
Status: DISCONTINUED | OUTPATIENT
Start: 2018-01-01 | End: 2018-01-01 | Stop reason: HOSPADM

## 2018-01-01 RX ORDER — SENNOSIDES 8.6 MG
650 CAPSULE ORAL EVERY 6 HOURS PRN
Qty: 30 TABLET | Refills: 0 | Status: SHIPPED | OUTPATIENT
Start: 2018-01-01

## 2018-01-01 RX ORDER — HYDROXYUREA 500 MG/1
1000 CAPSULE ORAL EVERY 12 HOURS
Qty: 30 CAPSULE | Refills: 0 | Status: CANCELLED | OUTPATIENT
Start: 2018-01-01

## 2018-01-01 RX ORDER — HYDROXYUREA 500 MG/1
1000 CAPSULE ORAL EVERY 12 HOURS
Status: DISCONTINUED | OUTPATIENT
Start: 2018-01-01 | End: 2018-01-01 | Stop reason: HOSPADM

## 2018-01-01 RX ORDER — HYDROXYZINE HYDROCHLORIDE 25 MG/1
25 TABLET, FILM COATED ORAL EVERY 6 HOURS PRN
Status: CANCELLED | OUTPATIENT
Start: 2018-01-01

## 2018-01-01 RX ORDER — HYDROXYUREA 500 MG/1
1000 CAPSULE ORAL DAILY
Status: COMPLETED | OUTPATIENT
Start: 2018-01-01 | End: 2018-01-01

## 2018-01-01 RX ORDER — LEVOFLOXACIN 500 MG/1
500 TABLET, FILM COATED ORAL EVERY 24 HOURS
Qty: 12 TABLET | Refills: 0 | Status: SHIPPED | OUTPATIENT
Start: 2018-01-01 | End: 2018-03-22

## 2018-01-01 RX ORDER — LANOLIN ALCOHOL/MO/W.PET/CERES
3 CREAM (GRAM) TOPICAL
Status: DISCONTINUED | OUTPATIENT
Start: 2018-01-01 | End: 2018-01-01 | Stop reason: HOSPADM

## 2018-01-01 RX ORDER — LANOLIN ALCOHOL/MO/W.PET/CERES
3 CREAM (GRAM) TOPICAL
Status: DISCONTINUED | OUTPATIENT
Start: 2018-01-01 | End: 2018-01-01

## 2018-01-01 RX ORDER — CHLORPROMAZINE HYDROCHLORIDE 10 MG/1
TABLET, FILM COATED ORAL
Qty: 20 TABLET | Refills: 1 | Status: SHIPPED | OUTPATIENT
Start: 2018-01-01 | End: 2018-01-01

## 2018-01-01 RX ORDER — ACETAMINOPHEN 650 MG/1
325 SUPPOSITORY RECTAL EVERY 4 HOURS PRN
Status: DISCONTINUED | OUTPATIENT
Start: 2018-01-01 | End: 2018-01-01 | Stop reason: HOSPADM

## 2018-01-01 RX ORDER — MIDAZOLAM HYDROCHLORIDE 1 MG/ML
INJECTION INTRAMUSCULAR; INTRAVENOUS CODE/TRAUMA/SEDATION MEDICATION
Status: COMPLETED | OUTPATIENT
Start: 2018-01-01 | End: 2018-01-01

## 2018-01-01 RX ORDER — BENZONATATE 100 MG/1
100 CAPSULE ORAL 3 TIMES DAILY PRN
Status: DISCONTINUED | OUTPATIENT
Start: 2018-01-01 | End: 2018-01-01 | Stop reason: HOSPADM

## 2018-01-01 RX ORDER — HYDROXYUREA 500 MG/1
1000 CAPSULE ORAL EVERY 12 HOURS SCHEDULED
Status: DISPENSED | OUTPATIENT
Start: 2018-01-01 | End: 2018-01-01

## 2018-01-01 RX ORDER — GUAIFENESIN 600 MG
600 TABLET, EXTENDED RELEASE 12 HR ORAL EVERY 12 HOURS SCHEDULED
Status: CANCELLED | OUTPATIENT
Start: 2018-01-01

## 2018-01-01 RX ORDER — MORPHINE SULFATE 20 MG/ML
5 SOLUTION ORAL EVERY 2 HOUR PRN
Qty: 30 ML | Refills: 0 | Status: SHIPPED | OUTPATIENT
Start: 2018-01-01 | End: 2018-03-19

## 2018-01-01 RX ORDER — GUAIFENESIN 600 MG
600 TABLET, EXTENDED RELEASE 12 HR ORAL 2 TIMES DAILY PRN
Qty: 30 TABLET | Refills: 0 | Status: SHIPPED | OUTPATIENT
Start: 2018-01-01

## 2018-01-01 RX ORDER — ACETAMINOPHEN 325 MG/1
650 TABLET ORAL EVERY 6 HOURS PRN
Status: DISCONTINUED | OUTPATIENT
Start: 2018-01-01 | End: 2018-01-01

## 2018-01-01 RX ORDER — BENZONATATE 100 MG/1
100 CAPSULE ORAL 3 TIMES DAILY PRN
Status: CANCELLED | OUTPATIENT
Start: 2018-01-01

## 2018-01-01 RX ORDER — SODIUM CHLORIDE 9 MG/ML
100 INJECTION, SOLUTION INTRAVENOUS CONTINUOUS
Status: DISCONTINUED | OUTPATIENT
Start: 2018-01-01 | End: 2018-01-01

## 2018-01-01 RX ORDER — HALOPERIDOL 0.5 MG/1
0.5 TABLET ORAL EVERY 2 HOUR PRN
Qty: 30 TABLET | Refills: 0 | Status: CANCELLED | OUTPATIENT
Start: 2018-01-01

## 2018-01-01 RX ORDER — CHLORPROMAZINE HYDROCHLORIDE 25 MG/1
25 TABLET, FILM COATED ORAL 3 TIMES DAILY
Status: DISCONTINUED | OUTPATIENT
Start: 2018-01-01 | End: 2018-01-01 | Stop reason: HOSPADM

## 2018-01-01 RX ORDER — HYDROXYUREA 500 MG/1
1000 CAPSULE ORAL EVERY 12 HOURS
Qty: 30 CAPSULE | Refills: 0 | Status: SHIPPED | OUTPATIENT
Start: 2018-01-01

## 2018-01-01 RX ORDER — SODIUM CHLORIDE 450 MG/100ML
50 INJECTION, SOLUTION INTRAVENOUS CONTINUOUS
Status: DISCONTINUED | OUTPATIENT
Start: 2018-01-01 | End: 2018-01-01

## 2018-01-01 RX ADMIN — SODIUM BICARBONATE 50 ML/HR: 84 INJECTION, SOLUTION INTRAVENOUS at 13:52

## 2018-01-01 RX ADMIN — DOCUSATE SODIUM 100 MG: 100 CAPSULE, LIQUID FILLED ORAL at 17:48

## 2018-01-01 RX ADMIN — GUAIFENESIN 600 MG: 600 TABLET, EXTENDED RELEASE ORAL at 21:59

## 2018-01-01 RX ADMIN — ACYCLOVIR 400 MG: 200 CAPSULE ORAL at 10:45

## 2018-01-01 RX ADMIN — GUAIFENESIN 600 MG: 600 TABLET, EXTENDED RELEASE ORAL at 09:07

## 2018-01-01 RX ADMIN — TRIAMCINOLONE ACETONIDE: 1 CREAM TOPICAL at 17:00

## 2018-01-01 RX ADMIN — DOCUSATE SODIUM 100 MG: 100 CAPSULE, LIQUID FILLED ORAL at 17:36

## 2018-01-01 RX ADMIN — HYDROXYUREA 1000 MG: 500 CAPSULE ORAL at 16:24

## 2018-01-01 RX ADMIN — ALLOPURINOL 100 MG: 100 TABLET ORAL at 09:30

## 2018-01-01 RX ADMIN — ACYCLOVIR 400 MG: 200 CAPSULE ORAL at 10:41

## 2018-01-01 RX ADMIN — GUAIFENESIN 600 MG: 600 TABLET, EXTENDED RELEASE ORAL at 10:03

## 2018-01-01 RX ADMIN — GUAIFENESIN 600 MG: 600 TABLET, EXTENDED RELEASE ORAL at 08:56

## 2018-01-01 RX ADMIN — ACETAMINOPHEN 650 MG: 325 TABLET, FILM COATED ORAL at 08:39

## 2018-01-01 RX ADMIN — GUAIFENESIN 600 MG: 600 TABLET, EXTENDED RELEASE ORAL at 08:21

## 2018-01-01 RX ADMIN — DOCUSATE SODIUM 100 MG: 100 CAPSULE, LIQUID FILLED ORAL at 17:37

## 2018-01-01 RX ADMIN — GUAIFENESIN 600 MG: 600 TABLET, EXTENDED RELEASE ORAL at 22:40

## 2018-01-01 RX ADMIN — MELATONIN 3 MG: 3 TAB ORAL at 22:31

## 2018-01-01 RX ADMIN — Medication 38 MG: at 18:42

## 2018-01-01 RX ADMIN — GUAIFENESIN 600 MG: 600 TABLET, EXTENDED RELEASE ORAL at 21:27

## 2018-01-01 RX ADMIN — ACYCLOVIR 400 MG: 200 CAPSULE ORAL at 20:55

## 2018-01-01 RX ADMIN — Medication 2000 MG: at 13:22

## 2018-01-01 RX ADMIN — GUAIFENESIN 600 MG: 600 TABLET, EXTENDED RELEASE ORAL at 22:16

## 2018-01-01 RX ADMIN — BENZONATATE 100 MG: 100 CAPSULE ORAL at 12:12

## 2018-01-01 RX ADMIN — ACETAMINOPHEN 650 MG: 325 TABLET, FILM COATED ORAL at 10:57

## 2018-01-01 RX ADMIN — GUAIFENESIN 600 MG: 600 TABLET, EXTENDED RELEASE ORAL at 09:30

## 2018-01-01 RX ADMIN — ACYCLOVIR 400 MG: 200 CAPSULE ORAL at 20:12

## 2018-01-01 RX ADMIN — GUAIFENESIN 600 MG: 600 TABLET, EXTENDED RELEASE ORAL at 22:34

## 2018-01-01 RX ADMIN — MELATONIN TAB 3 MG 3 MG: 3 TAB at 16:53

## 2018-01-01 RX ADMIN — CEFTRIAXONE 1000 MG: 1 INJECTION, SOLUTION INTRAVENOUS at 14:06

## 2018-01-01 RX ADMIN — CEPHALEXIN 500 MG: 500 CAPSULE ORAL at 10:32

## 2018-01-01 RX ADMIN — CHLORPROMAZINE HYDROCHLORIDE 25 MG: 25 TABLET, SUGAR COATED ORAL at 17:37

## 2018-01-01 RX ADMIN — TRIAMCINOLONE ACETONIDE: 1 CREAM TOPICAL at 17:25

## 2018-01-01 RX ADMIN — CEPHALEXIN 500 MG: 500 CAPSULE ORAL at 23:17

## 2018-01-01 RX ADMIN — HYDROXYUREA 1000 MG: 500 CAPSULE ORAL at 11:10

## 2018-01-01 RX ADMIN — ALLOPURINOL 100 MG: 100 TABLET ORAL at 08:21

## 2018-01-01 RX ADMIN — BACLOFEN 5 MG: 10 TABLET ORAL at 17:47

## 2018-01-01 RX ADMIN — BACLOFEN 5 MG: 10 TABLET ORAL at 17:28

## 2018-01-01 RX ADMIN — MELATONIN 3 MG: 3 TAB ORAL at 21:23

## 2018-01-01 RX ADMIN — GUAIFENESIN 600 MG: 600 TABLET, EXTENDED RELEASE ORAL at 20:55

## 2018-01-01 RX ADMIN — SODIUM CHLORIDE 50 ML/HR: 0.45 INJECTION, SOLUTION INTRAVENOUS at 09:12

## 2018-01-01 RX ADMIN — CEFTRIAXONE 1000 MG: 1 INJECTION, SOLUTION INTRAVENOUS at 15:31

## 2018-01-01 RX ADMIN — TRIAMCINOLONE ACETONIDE: 1 CREAM TOPICAL at 10:09

## 2018-01-01 RX ADMIN — CEFTRIAXONE 1000 MG: 1 INJECTION, SOLUTION INTRAVENOUS at 15:40

## 2018-01-01 RX ADMIN — TRIAMCINOLONE ACETONIDE: 1 CREAM TOPICAL at 21:28

## 2018-01-01 RX ADMIN — DOCUSATE SODIUM 100 MG: 100 CAPSULE, LIQUID FILLED ORAL at 10:32

## 2018-01-01 RX ADMIN — ACETAMINOPHEN 650 MG: 325 TABLET, FILM COATED ORAL at 17:13

## 2018-01-01 RX ADMIN — ALLOPURINOL 100 MG: 100 TABLET ORAL at 10:08

## 2018-01-01 RX ADMIN — ONDANSETRON 16 MG: 2 INJECTION INTRAMUSCULAR; INTRAVENOUS at 17:34

## 2018-01-01 RX ADMIN — SODIUM CHLORIDE 75 ML/HR: 0.9 INJECTION, SOLUTION INTRAVENOUS at 22:55

## 2018-01-01 RX ADMIN — DOCUSATE SODIUM 100 MG: 100 CAPSULE, LIQUID FILLED ORAL at 17:42

## 2018-01-01 RX ADMIN — ACYCLOVIR 400 MG: 200 CAPSULE ORAL at 10:31

## 2018-01-01 RX ADMIN — BACLOFEN 5 MG: 10 TABLET ORAL at 16:59

## 2018-01-01 RX ADMIN — GUAIFENESIN 600 MG: 600 TABLET, EXTENDED RELEASE ORAL at 21:36

## 2018-01-01 RX ADMIN — TRIAMCINOLONE ACETONIDE 1 APPLICATION: 1 CREAM TOPICAL at 17:20

## 2018-01-01 RX ADMIN — HYDROXYUREA 1000 MG: 500 CAPSULE ORAL at 13:08

## 2018-01-01 RX ADMIN — BACLOFEN 5 MG: 10 TABLET ORAL at 09:30

## 2018-01-01 RX ADMIN — POLYETHYLENE GLYCOL 3350 17 G: 17 POWDER, FOR SOLUTION ORAL at 10:32

## 2018-01-01 RX ADMIN — TRIAMCINOLONE ACETONIDE: 1 CREAM TOPICAL at 18:28

## 2018-01-01 RX ADMIN — DOCUSATE SODIUM 100 MG: 100 CAPSULE, LIQUID FILLED ORAL at 09:11

## 2018-01-01 RX ADMIN — TRIAMCINOLONE ACETONIDE: 1 CREAM TOPICAL at 10:04

## 2018-01-01 RX ADMIN — TRIAMCINOLONE ACETONIDE: 1 CREAM TOPICAL at 19:40

## 2018-01-01 RX ADMIN — ALLOPURINOL 100 MG: 100 TABLET ORAL at 10:41

## 2018-01-01 RX ADMIN — ALLOPURINOL 100 MG: 100 TABLET ORAL at 08:37

## 2018-01-01 RX ADMIN — GUAIFENESIN 600 MG: 600 TABLET, EXTENDED RELEASE ORAL at 20:53

## 2018-01-01 RX ADMIN — SODIUM CHLORIDE 50 ML/HR: 0.45 INJECTION, SOLUTION INTRAVENOUS at 10:44

## 2018-01-01 RX ADMIN — TRIAMCINOLONE ACETONIDE: 1 CREAM TOPICAL at 10:32

## 2018-01-01 RX ADMIN — AZITHROMYCIN MONOHYDRATE 500 MG: 500 INJECTION, POWDER, LYOPHILIZED, FOR SOLUTION INTRAVENOUS at 00:43

## 2018-01-01 RX ADMIN — TRIAMCINOLONE ACETONIDE: 1 CREAM TOPICAL at 17:51

## 2018-01-01 RX ADMIN — GUAIFENESIN 600 MG: 600 TABLET, EXTENDED RELEASE ORAL at 08:37

## 2018-01-01 RX ADMIN — HYDROXYZINE HYDROCHLORIDE 25 MG: 25 TABLET, FILM COATED ORAL at 17:13

## 2018-01-01 RX ADMIN — DECITABINE 38 MG: 50 INJECTION, POWDER, LYOPHILIZED, FOR SOLUTION INTRAVENOUS at 18:24

## 2018-01-01 RX ADMIN — GUAIFENESIN 600 MG: 600 TABLET, EXTENDED RELEASE ORAL at 20:15

## 2018-01-01 RX ADMIN — CHLORPROMAZINE HYDROCHLORIDE 25 MG: 25 TABLET, SUGAR COATED ORAL at 11:10

## 2018-01-01 RX ADMIN — AZITHROMYCIN MONOHYDRATE 500 MG: 500 INJECTION, POWDER, LYOPHILIZED, FOR SOLUTION INTRAVENOUS at 01:26

## 2018-01-01 RX ADMIN — ACETAMINOPHEN 650 MG: 325 TABLET, FILM COATED ORAL at 13:13

## 2018-01-01 RX ADMIN — ACYCLOVIR 400 MG: 200 CAPSULE ORAL at 21:59

## 2018-01-01 RX ADMIN — GUAIFENESIN 600 MG: 600 TABLET, EXTENDED RELEASE ORAL at 10:45

## 2018-01-01 RX ADMIN — CEPHALEXIN 500 MG: 500 CAPSULE ORAL at 22:16

## 2018-01-01 RX ADMIN — BACLOFEN 5 MG: 10 TABLET ORAL at 10:03

## 2018-01-01 RX ADMIN — POLYETHYLENE GLYCOL 3350 17 G: 17 POWDER, FOR SOLUTION ORAL at 09:10

## 2018-01-01 RX ADMIN — DOCUSATE SODIUM 100 MG: 100 CAPSULE, LIQUID FILLED ORAL at 19:34

## 2018-01-01 RX ADMIN — CEFEPIME HYDROCHLORIDE 2000 MG: 2 INJECTION, POWDER, FOR SOLUTION INTRAVENOUS at 12:14

## 2018-01-01 RX ADMIN — ALLOPURINOL 100 MG: 100 TABLET ORAL at 11:09

## 2018-01-01 RX ADMIN — TRIAMCINOLONE ACETONIDE: 1 CREAM TOPICAL at 17:03

## 2018-01-01 RX ADMIN — CHLORPROMAZINE HYDROCHLORIDE 25 MG: 25 TABLET, SUGAR COATED ORAL at 22:00

## 2018-01-01 RX ADMIN — ACYCLOVIR 400 MG: 200 CAPSULE ORAL at 09:07

## 2018-01-01 RX ADMIN — GUAIFENESIN 600 MG: 600 TABLET, EXTENDED RELEASE ORAL at 20:18

## 2018-01-01 RX ADMIN — ONDANSETRON 16 MG: 2 INJECTION INTRAMUSCULAR; INTRAVENOUS at 16:34

## 2018-01-01 RX ADMIN — ALLOPURINOL 100 MG: 100 TABLET ORAL at 10:15

## 2018-01-01 RX ADMIN — MELATONIN TAB 3 MG 3 MG: 3 TAB at 17:47

## 2018-01-01 RX ADMIN — ALLOPURINOL 100 MG: 100 TABLET ORAL at 08:50

## 2018-01-01 RX ADMIN — AZITHROMYCIN MONOHYDRATE 500 MG: 500 INJECTION, POWDER, LYOPHILIZED, FOR SOLUTION INTRAVENOUS at 01:10

## 2018-01-01 RX ADMIN — ALLOPURINOL 100 MG: 100 TABLET ORAL at 09:11

## 2018-01-01 RX ADMIN — ACYCLOVIR 400 MG: 200 CAPSULE ORAL at 11:09

## 2018-01-01 RX ADMIN — BACLOFEN 5 MG: 10 TABLET ORAL at 10:40

## 2018-01-01 RX ADMIN — HYDROXYUREA 1000 MG: 500 CAPSULE ORAL at 05:29

## 2018-01-01 RX ADMIN — GUAIFENESIN 600 MG: 600 TABLET, EXTENDED RELEASE ORAL at 21:35

## 2018-01-01 RX ADMIN — ALLOPURINOL 100 MG: 100 TABLET ORAL at 10:32

## 2018-01-01 RX ADMIN — CEPHALEXIN 500 MG: 500 CAPSULE ORAL at 10:41

## 2018-01-01 RX ADMIN — CEFEPIME HYDROCHLORIDE 2000 MG: 2 INJECTION, POWDER, FOR SOLUTION INTRAVENOUS at 02:01

## 2018-01-01 RX ADMIN — GUAIFENESIN 600 MG: 600 TABLET, EXTENDED RELEASE ORAL at 21:28

## 2018-01-01 RX ADMIN — GUAIFENESIN 600 MG: 600 TABLET, EXTENDED RELEASE ORAL at 20:24

## 2018-01-01 RX ADMIN — TRIAMCINOLONE ACETONIDE: 1 CREAM TOPICAL at 10:15

## 2018-01-01 RX ADMIN — MELATONIN 3 MG: 3 TAB ORAL at 21:04

## 2018-01-01 RX ADMIN — BACLOFEN 5 MG: 10 TABLET ORAL at 19:34

## 2018-01-01 RX ADMIN — ACYCLOVIR 400 MG: 200 CAPSULE ORAL at 10:20

## 2018-01-01 RX ADMIN — BACLOFEN 5 MG: 10 TABLET ORAL at 20:56

## 2018-01-01 RX ADMIN — GUAIFENESIN 600 MG: 600 TABLET, EXTENDED RELEASE ORAL at 10:08

## 2018-01-01 RX ADMIN — TRIAMCINOLONE ACETONIDE: 1 CREAM TOPICAL at 08:54

## 2018-01-01 RX ADMIN — CEFEPIME HYDROCHLORIDE 1000 MG: 1 INJECTION, SOLUTION INTRAVENOUS at 22:55

## 2018-01-01 RX ADMIN — GUAIFENESIN 600 MG: 600 TABLET, EXTENDED RELEASE ORAL at 10:20

## 2018-01-01 RX ADMIN — MELATONIN 3 MG: 3 TAB ORAL at 21:59

## 2018-01-01 RX ADMIN — CEPHALEXIN 500 MG: 500 CAPSULE ORAL at 08:21

## 2018-01-01 RX ADMIN — ACYCLOVIR 400 MG: 200 CAPSULE ORAL at 21:19

## 2018-01-01 RX ADMIN — HYDROXYUREA 1000 MG: 500 CAPSULE ORAL at 14:21

## 2018-01-01 RX ADMIN — Medication 2000 MG: at 13:57

## 2018-01-01 RX ADMIN — SODIUM CHLORIDE 50 ML/HR: 0.45 INJECTION, SOLUTION INTRAVENOUS at 12:11

## 2018-01-01 RX ADMIN — DOCUSATE SODIUM 100 MG: 100 CAPSULE, LIQUID FILLED ORAL at 08:21

## 2018-01-01 RX ADMIN — TRIAMCINOLONE ACETONIDE: 1 CREAM TOPICAL at 17:42

## 2018-01-01 RX ADMIN — TRIAMCINOLONE ACETONIDE: 1 CREAM TOPICAL at 17:28

## 2018-01-01 RX ADMIN — VANCOMYCIN HYDROCHLORIDE 750 MG: 10 INJECTION, POWDER, LYOPHILIZED, FOR SOLUTION INTRAVENOUS at 11:54

## 2018-01-01 RX ADMIN — TRIAMCINOLONE ACETONIDE 1 APPLICATION: 1 CREAM TOPICAL at 10:25

## 2018-01-01 RX ADMIN — BACLOFEN 5 MG: 10 TABLET ORAL at 08:50

## 2018-01-01 RX ADMIN — SODIUM CHLORIDE 75 ML/HR: 0.9 INJECTION, SOLUTION INTRAVENOUS at 12:47

## 2018-01-01 RX ADMIN — TRIAMCINOLONE ACETONIDE: 1 CREAM TOPICAL at 08:40

## 2018-01-01 RX ADMIN — SODIUM BICARBONATE 50 ML/HR: 84 INJECTION, SOLUTION INTRAVENOUS at 17:41

## 2018-01-01 RX ADMIN — GUAIFENESIN 600 MG: 600 TABLET, EXTENDED RELEASE ORAL at 22:30

## 2018-01-01 RX ADMIN — VANCOMYCIN HYDROCHLORIDE 750 MG: 10 INJECTION, POWDER, LYOPHILIZED, FOR SOLUTION INTRAVENOUS at 02:27

## 2018-01-01 RX ADMIN — SODIUM CHLORIDE 50 ML/HR: 0.45 INJECTION, SOLUTION INTRAVENOUS at 17:23

## 2018-01-01 RX ADMIN — HYDROXYUREA 1000 MG: 500 CAPSULE ORAL at 17:20

## 2018-01-01 RX ADMIN — ACYCLOVIR 400 MG: 200 CAPSULE ORAL at 22:15

## 2018-01-01 RX ADMIN — CEFEPIME HYDROCHLORIDE 2000 MG: 2 INJECTION, POWDER, FOR SOLUTION INTRAVENOUS at 11:50

## 2018-01-01 RX ADMIN — ACYCLOVIR 400 MG: 200 CAPSULE ORAL at 21:34

## 2018-01-01 RX ADMIN — SODIUM BICARBONATE 50 ML/HR: 84 INJECTION, SOLUTION INTRAVENOUS at 10:45

## 2018-01-01 RX ADMIN — ALLOPURINOL 100 MG: 100 TABLET ORAL at 09:07

## 2018-01-01 RX ADMIN — ACYCLOVIR 400 MG: 200 CAPSULE ORAL at 20:15

## 2018-01-01 RX ADMIN — HYDROXYUREA 1000 MG: 500 CAPSULE ORAL at 12:12

## 2018-01-01 RX ADMIN — DECITABINE 38 MG: 50 INJECTION, POWDER, LYOPHILIZED, FOR SOLUTION INTRAVENOUS at 17:15

## 2018-01-01 RX ADMIN — ACYCLOVIR 400 MG: 200 CAPSULE ORAL at 08:37

## 2018-01-01 RX ADMIN — CEPHALEXIN 500 MG: 500 CAPSULE ORAL at 21:34

## 2018-01-01 RX ADMIN — ONDANSETRON 16 MG: 2 INJECTION INTRAMUSCULAR; INTRAVENOUS at 17:06

## 2018-01-01 RX ADMIN — ACYCLOVIR 400 MG: 200 CAPSULE ORAL at 21:25

## 2018-01-01 RX ADMIN — ACETAMINOPHEN 650 MG: 325 TABLET, FILM COATED ORAL at 17:48

## 2018-01-01 RX ADMIN — MELATONIN TAB 3 MG 3 MG: 3 TAB at 16:59

## 2018-01-01 RX ADMIN — HYDROXYUREA 1000 MG: 500 CAPSULE ORAL at 01:41

## 2018-01-01 RX ADMIN — Medication 2000 MG: at 00:12

## 2018-01-01 RX ADMIN — DOCUSATE SODIUM 100 MG: 100 CAPSULE, LIQUID FILLED ORAL at 11:09

## 2018-01-01 RX ADMIN — GUAIFENESIN 600 MG: 600 TABLET, EXTENDED RELEASE ORAL at 10:32

## 2018-01-01 RX ADMIN — ACYCLOVIR 400 MG: 200 CAPSULE ORAL at 22:31

## 2018-01-01 RX ADMIN — HYDROXYUREA 1000 MG: 500 CAPSULE ORAL at 15:47

## 2018-01-01 RX ADMIN — HYDROXYUREA 1000 MG: 500 CAPSULE ORAL at 04:20

## 2018-01-01 RX ADMIN — ACYCLOVIR 400 MG: 200 CAPSULE ORAL at 10:03

## 2018-01-01 RX ADMIN — HYDROXYUREA 1000 MG: 500 CAPSULE ORAL at 14:06

## 2018-01-01 RX ADMIN — CEFEPIME HYDROCHLORIDE 1000 MG: 1 INJECTION, SOLUTION INTRAVENOUS at 10:48

## 2018-01-01 RX ADMIN — TRIAMCINOLONE ACETONIDE: 1 CREAM TOPICAL at 08:21

## 2018-01-01 RX ADMIN — DOCUSATE SODIUM 100 MG: 100 CAPSULE, LIQUID FILLED ORAL at 08:56

## 2018-01-01 RX ADMIN — GUAIFENESIN 600 MG: 600 TABLET, EXTENDED RELEASE ORAL at 08:40

## 2018-01-01 RX ADMIN — TRIAMCINOLONE ACETONIDE: 1 CREAM TOPICAL at 10:45

## 2018-01-01 RX ADMIN — CEFTRIAXONE 1000 MG: 1 INJECTION, SOLUTION INTRAVENOUS at 14:23

## 2018-01-01 RX ADMIN — ACETAMINOPHEN 325 MG: 650 SUPPOSITORY RECTAL at 23:41

## 2018-01-01 RX ADMIN — CEFTRIAXONE 1000 MG: 1 INJECTION, SOLUTION INTRAVENOUS at 15:02

## 2018-01-01 RX ADMIN — HYDROXYUREA 1000 MG: 500 CAPSULE ORAL at 22:15

## 2018-01-01 RX ADMIN — ACYCLOVIR 400 MG: 200 CAPSULE ORAL at 20:53

## 2018-01-01 RX ADMIN — MIDAZOLAM 0.5 MG: 1 INJECTION INTRAMUSCULAR; INTRAVENOUS at 13:42

## 2018-01-01 RX ADMIN — MELATONIN TAB 3 MG 3 MG: 3 TAB at 17:36

## 2018-01-01 RX ADMIN — GUAIFENESIN 600 MG: 600 TABLET, EXTENDED RELEASE ORAL at 10:41

## 2018-01-01 RX ADMIN — BACLOFEN 5 MG: 10 TABLET ORAL at 20:18

## 2018-01-01 RX ADMIN — MELATONIN 3 MG: 3 TAB ORAL at 21:25

## 2018-01-01 RX ADMIN — GUAIFENESIN 600 MG: 600 TABLET, EXTENDED RELEASE ORAL at 10:31

## 2018-01-01 RX ADMIN — ALLOPURINOL 100 MG: 100 TABLET ORAL at 14:06

## 2018-01-01 RX ADMIN — ALLOPURINOL 100 MG: 100 TABLET ORAL at 10:45

## 2018-01-01 RX ADMIN — TRIAMCINOLONE ACETONIDE: 1 CREAM TOPICAL at 10:21

## 2018-01-01 RX ADMIN — DOCUSATE SODIUM 100 MG: 100 CAPSULE, LIQUID FILLED ORAL at 17:28

## 2018-01-01 RX ADMIN — ACYCLOVIR 400 MG: 200 CAPSULE ORAL at 21:36

## 2018-01-01 RX ADMIN — ACYCLOVIR 400 MG: 200 CAPSULE ORAL at 10:08

## 2018-01-01 RX ADMIN — CHLORPROMAZINE HYDROCHLORIDE 25 MG: 25 TABLET, SUGAR COATED ORAL at 23:37

## 2018-01-01 RX ADMIN — CEFEPIME HYDROCHLORIDE 2000 MG: 2 INJECTION, POWDER, FOR SOLUTION INTRAVENOUS at 13:59

## 2018-01-01 RX ADMIN — ONDANSETRON 16 MG: 2 INJECTION INTRAMUSCULAR; INTRAVENOUS at 18:01

## 2018-01-01 RX ADMIN — GUAIFENESIN 600 MG: 600 TABLET, EXTENDED RELEASE ORAL at 21:23

## 2018-01-01 RX ADMIN — TRIAMCINOLONE ACETONIDE: 1 CREAM TOPICAL at 09:13

## 2018-01-01 RX ADMIN — ALLOPURINOL 100 MG: 100 TABLET ORAL at 10:20

## 2018-01-01 RX ADMIN — TRIAMCINOLONE ACETONIDE: 1 CREAM TOPICAL at 13:26

## 2018-01-01 RX ADMIN — SODIUM CHLORIDE 100 ML/HR: 0.9 INJECTION, SOLUTION INTRAVENOUS at 18:47

## 2018-01-01 RX ADMIN — GUAIFENESIN 600 MG: 600 TABLET, EXTENDED RELEASE ORAL at 16:07

## 2018-01-01 RX ADMIN — ACYCLOVIR 400 MG: 200 CAPSULE ORAL at 08:56

## 2018-01-01 RX ADMIN — TRIAMCINOLONE ACETONIDE 1 APPLICATION: 1 CREAM TOPICAL at 17:50

## 2018-01-01 RX ADMIN — AZITHROMYCIN MONOHYDRATE 500 MG: 500 INJECTION, POWDER, LYOPHILIZED, FOR SOLUTION INTRAVENOUS at 00:02

## 2018-01-01 RX ADMIN — SODIUM CHLORIDE 50 ML/HR: 0.45 INJECTION, SOLUTION INTRAVENOUS at 23:42

## 2018-01-01 RX ADMIN — SODIUM CHLORIDE 1000 ML: 0.9 INJECTION, SOLUTION INTRAVENOUS at 17:09

## 2018-01-01 RX ADMIN — MELATONIN TAB 3 MG 3 MG: 3 TAB at 19:34

## 2018-01-01 RX ADMIN — MELATONIN TAB 3 MG 3 MG: 3 TAB at 20:19

## 2018-01-01 RX ADMIN — SODIUM CHLORIDE 50 ML/HR: 0.45 INJECTION, SOLUTION INTRAVENOUS at 21:23

## 2018-01-01 RX ADMIN — GUAIFENESIN 600 MG: 600 TABLET, EXTENDED RELEASE ORAL at 11:09

## 2018-01-01 RX ADMIN — DIBASIC SODIUM PHOSPHATE, MONOBASIC POTASSIUM PHOSPHATE AND MONOBASIC SODIUM PHOSPHATE 2 TABLET: 852; 155; 130 TABLET ORAL at 10:14

## 2018-01-01 RX ADMIN — TRIAMCINOLONE ACETONIDE: 1 CREAM TOPICAL at 09:07

## 2018-01-01 RX ADMIN — DOCUSATE SODIUM 100 MG: 100 CAPSULE, LIQUID FILLED ORAL at 10:41

## 2018-01-01 RX ADMIN — ACYCLOVIR 400 MG: 200 CAPSULE ORAL at 21:23

## 2018-01-01 RX ADMIN — HYDROXYUREA 1000 MG: 500 CAPSULE ORAL at 13:18

## 2018-01-01 RX ADMIN — DOCUSATE SODIUM 100 MG: 100 CAPSULE, LIQUID FILLED ORAL at 17:46

## 2018-01-01 RX ADMIN — Medication 2000 MG: at 00:43

## 2018-01-01 RX ADMIN — AZITHROMYCIN MONOHYDRATE 500 MG: 500 INJECTION, POWDER, LYOPHILIZED, FOR SOLUTION INTRAVENOUS at 01:30

## 2018-01-01 RX ADMIN — GUAIFENESIN 600 MG: 600 TABLET, EXTENDED RELEASE ORAL at 10:38

## 2018-01-01 RX ADMIN — CEFTRIAXONE 1000 MG: 1 INJECTION, SOLUTION INTRAVENOUS at 14:11

## 2018-01-01 RX ADMIN — ACYCLOVIR 400 MG: 200 CAPSULE ORAL at 09:11

## 2018-01-01 RX ADMIN — GUAIFENESIN 600 MG: 600 TABLET, EXTENDED RELEASE ORAL at 21:19

## 2018-01-01 RX ADMIN — ACYCLOVIR 400 MG: 200 CAPSULE ORAL at 09:30

## 2018-01-01 RX ADMIN — HYDROXYUREA 1000 MG: 500 CAPSULE ORAL at 00:05

## 2018-01-01 RX ADMIN — ACYCLOVIR 400 MG: 200 CAPSULE ORAL at 20:24

## 2018-01-01 RX ADMIN — ACETAMINOPHEN 650 MG: 325 TABLET, FILM COATED ORAL at 08:21

## 2018-01-01 RX ADMIN — DOCUSATE SODIUM 100 MG: 100 CAPSULE, LIQUID FILLED ORAL at 10:04

## 2018-01-01 RX ADMIN — ACYCLOVIR 400 MG: 200 CAPSULE ORAL at 08:21

## 2018-01-01 RX ADMIN — TRIAMCINOLONE ACETONIDE: 1 CREAM TOPICAL at 17:37

## 2018-01-01 RX ADMIN — POLYETHYLENE GLYCOL 3350 17 G: 17 POWDER, FOR SOLUTION ORAL at 08:21

## 2018-01-01 RX ADMIN — MELATONIN 3 MG: 3 TAB ORAL at 21:35

## 2018-01-01 RX ADMIN — ACYCLOVIR 400 MG: 200 CAPSULE ORAL at 20:18

## 2018-01-01 RX ADMIN — TRIAMCINOLONE ACETONIDE: 1 CREAM TOPICAL at 10:41

## 2018-01-01 RX ADMIN — GUAIFENESIN 600 MG: 600 TABLET, EXTENDED RELEASE ORAL at 09:11

## 2018-01-01 RX ADMIN — TRIAMCINOLONE ACETONIDE: 1 CREAM TOPICAL at 09:32

## 2018-01-01 RX ADMIN — CHLORPROMAZINE HYDROCHLORIDE 25 MG: 25 TABLET, SUGAR COATED ORAL at 14:06

## 2018-01-01 RX ADMIN — Medication 2000 MG: at 02:43

## 2018-01-01 RX ADMIN — SODIUM BICARBONATE 50 ML/HR: 84 INJECTION, SOLUTION INTRAVENOUS at 17:23

## 2018-01-01 RX ADMIN — DECITABINE 38 MG: 50 INJECTION, POWDER, LYOPHILIZED, FOR SOLUTION INTRAVENOUS at 18:12

## 2018-01-01 RX ADMIN — Medication 16 MG: at 18:13

## 2018-01-01 RX ADMIN — Medication 2000 MG: at 13:33

## 2018-01-01 RX ADMIN — TRIAMCINOLONE ACETONIDE: 1 CREAM TOPICAL at 17:13

## 2018-01-01 RX ADMIN — POLYETHYLENE GLYCOL 3350 17 G: 17 POWDER, FOR SOLUTION ORAL at 08:55

## 2018-01-01 RX ADMIN — ALLOPURINOL 100 MG: 100 TABLET ORAL at 08:56

## 2018-01-01 RX ADMIN — FENTANYL CITRATE 50 MCG: 50 INJECTION, SOLUTION INTRAMUSCULAR; INTRAVENOUS at 13:42

## 2018-01-01 RX ADMIN — TRIAMCINOLONE ACETONIDE 1 APPLICATION: 1 CREAM TOPICAL at 08:37

## 2018-01-01 RX ADMIN — CEFEPIME HYDROCHLORIDE 2000 MG: 2 INJECTION, POWDER, FOR SOLUTION INTRAVENOUS at 02:16

## 2018-01-01 RX ADMIN — VANCOMYCIN HYDROCHLORIDE 750 MG: 10 INJECTION, POWDER, LYOPHILIZED, FOR SOLUTION INTRAVENOUS at 14:59

## 2018-01-01 RX ADMIN — VANCOMYCIN HYDROCHLORIDE 750 MG: 10 INJECTION, POWDER, LYOPHILIZED, FOR SOLUTION INTRAVENOUS at 00:12

## 2018-01-01 RX ADMIN — BACLOFEN 5 MG: 10 TABLET ORAL at 22:30

## 2018-01-01 RX ADMIN — MELATONIN TAB 3 MG 3 MG: 3 TAB at 17:28

## 2018-01-01 RX ADMIN — GUAIFENESIN 600 MG: 600 TABLET, EXTENDED RELEASE ORAL at 08:50

## 2018-01-01 RX ADMIN — GUAIFENESIN 600 MG: 600 TABLET, EXTENDED RELEASE ORAL at 20:35

## 2018-01-01 RX ADMIN — BACLOFEN 5 MG: 10 TABLET ORAL at 17:13

## 2018-01-01 RX ADMIN — TRIAMCINOLONE ACETONIDE: 1 CREAM TOPICAL at 11:11

## 2018-01-01 RX ADMIN — TRIAMCINOLONE ACETONIDE: 1 CREAM TOPICAL at 17:45

## 2018-01-01 RX ADMIN — CHLORPROMAZINE HYDROCHLORIDE 25 MG: 25 TABLET, SUGAR COATED ORAL at 09:13

## 2018-01-01 RX ADMIN — GUAIFENESIN 600 MG: 600 TABLET, EXTENDED RELEASE ORAL at 20:12

## 2018-01-01 RX ADMIN — DECITABINE 38 MG: 50 INJECTION, POWDER, LYOPHILIZED, FOR SOLUTION INTRAVENOUS at 18:27

## 2018-01-01 RX ADMIN — ACYCLOVIR 400 MG: 200 CAPSULE ORAL at 08:50

## 2018-01-01 RX ADMIN — Medication 2000 MG: at 23:28

## 2018-01-01 RX ADMIN — ACETAMINOPHEN 650 MG: 325 TABLET, FILM COATED ORAL at 16:46

## 2018-01-01 RX ADMIN — Medication 2000 MG: at 12:10

## 2018-01-01 RX ADMIN — POLYETHYLENE GLYCOL 3350 17 G: 17 POWDER, FOR SOLUTION ORAL at 10:04

## 2018-01-01 RX ADMIN — BACLOFEN 5 MG: 10 TABLET ORAL at 08:56

## 2018-01-01 RX ADMIN — HYDROXYUREA 1000 MG: 500 CAPSULE ORAL at 00:30

## 2018-01-01 RX ADMIN — POLYETHYLENE GLYCOL 3350 17 G: 17 POWDER, FOR SOLUTION ORAL at 10:41

## 2018-01-01 RX ADMIN — CEFTRIAXONE 1000 MG: 1 INJECTION, SOLUTION INTRAVENOUS at 13:40

## 2018-01-06 NOTE — PROGRESS NOTES
Assessment   1  Folliculitis (794 1) (F33 9)    Plan   Folliculitis    · Start: Amoxicillin 250 MG Oral Capsule; TAKE 1 CAPSULE 3 times daily   · Start: PredniSONE 10 MG Oral Tablet; TAKE 1 TABLET 3 times daily with meals    Discussion/Summary      #1  Folliculitis, rule out insect bites-patient placed on prednisone 10 mg 1 tablet 3 times a day # 21  The patient is to take the prednisone with meals  He was also instructed to  some Claritin 10 mg 1 daily for the H and was given amoxicillin 250 mg 1 3 times a day #20  #1  to get rid of his rash  up 1 week for progress and if no better, dermatology consult  Possible side effects of new medications were reviewed with the patient/guardian today  The treatment plan was reviewed with the patient/guardian  The patient/guardian understands and agrees with the treatment plan       Self Referrals: No      Chief Complaint   Itchy rash all over x 3 days  - [facility]      History of Present Illness   HPI: This is an 70-year-old male who comes in with a rash on his back and chest and abdomen for the last 3 days  He has not been in a hotel and the rash appears to be a possible insect bite problem  However some of the bites are red and appear to be mildly inflamed  He has not changed his soap and he has not been staying in a hotel room but did stay with friends over the holidays  He has been using Kenalog cream on the bed areas which helps slightly  His blood pressure today is 104/60 and his weight is down 4 lb from the previous visit to 177 lb  Review of Systems        Constitutional: no fever or chills, feels well, no tiredness, no recent weight loss or weight gain  Cardiovascular: no complaints of slow or fast heart rate, no chest pain, no palpitations, no leg claudication or lower extremity edema  Respiratory: no complaints of shortness of breath, no wheezing or cough, no dyspnea on exertion, no orthopnea or PND        Gastrointestinal: no complaints of abdominal pain, no constipation, no nausea or vomiting, no diarrhea or bloody stools  Genitourinary: no complaints of dysuria or incontinence, no hesitancy, no nocturia, no genital lesion, no inadequacy of penile erection  Integumentary: itching-- and-- Macular rash involving back chest and abdomen  Multiple papules inflamed, but-- as noted in HPI,-- no dry skin,-- no skin lesions-- and-- no skin wound--       The patient presents with complaints of sudden onset of constant episodes of moderate bilateral truncal area a rash  ROS reviewed  Active Problems   1  Acute conjunctivitis (372 00) (H10 30)  2  Acute pain of right foot (729 5) (M79 671)  3  Atopic dermatitis (691 8) (L20 9)  4  Balance problems (781 99) (R26 89)  5  Basal cell carcinoma of skin (173 91) (C44 91)  6  Basal cell carcinoma of upper extremity (173 61) (C44 611)  7  Blood pressure check (V81 1) (Z01 30)  8  Contusion of hand, right (923 20) (S60 221A)  9  Contusion of right shoulder (923 00) (S40 011A)  10  Contusion of right wrist (923 21) (S60 211A)  11  Diverticulosis (562 10) (K57 90)  12  Encounter for prostate cancer screening (V76 44) (Z12 5)  13  Enlarged prostate without lower urinary tract symptoms (luts) (600 00) (N40 0)  14  Epistaxis (784 7) (R04 0)  15  Erectile dysfunction (607 84) (N52 9)  16  Hematuria (599 70) (R31 9)  17  Hematuria, gross (599 71) (R31 0)  18  Hyperglycemia (790 29) (R73 9)  19  Laceration of oral cavity, subsequent encounter (V58 89,873 60) (S01 512D)  20  Melanoma in situ (172 9) (D03 9)  21  Multiple contusions (924 8) (T07  XXXA)  22  Neck muscle spasm (728 85) (M62 838)  23  Neck pain (723 1) (M54 2)  24  Need for immunization against influenza (V04 81) (Z23)  25  Need for pneumococcal vaccination (V03 82) (Z23)  26  Peripheral arterial disease (443 9) (I73 9)  27  Peripheral neuropathy (356 9) (G62 9)  28  Peripheral vascular disease (443 9) (I73 9)  29   Postoperative state (V44 89) (Z98 890)  30  Preop cardiovascular exam (V72 81) (Z01 810)  31  Right rotator cuff tear (840 4) (M75 101)  32  Rotator cuff tendinitis (726 10) (M75 80)  33  Sepsis secondary to UTI (038 9,995 91,599 0) (A41 9,N39 0)  34  Skin ulcer (707 9) (L98 499)  35  Squamous Cell Carcinoma Of The Skin (173 92)  36  Tinea cruris (110 3) (B35 6)  37  Triceps strain, left, initial encounter (840 8) (S46 312A)  38  Urinary retention (788 20) (R33 9)  39  UTI (urinary tract infection) (599 0) (N39 0)  40  Varicose veins of left lower extremity with other complications (222 1) (H18 152)  41  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)  42  Vasovagal syncope (780 2) (R55)  43  Visual disturbances (368 9) (H53 9)    Past Medical History   1  History of Abscess of face (682 0) (L02 01)  2  History of Herpes zoster (053 9) (B02 9)  Active Problems And Past Medical History Reviewed: The active problems and past medical history were reviewed and updated today  Family History   Mother   1  Family history of Coronary Artery Disease (V17 49)  2  Family history of Family Health Status Of Mother -   3  Family history of Stroke Syndrome (V17 1)  Father   4  Family history of Coronary Artery Disease (V17 49)  5  Family history of Family Health Status Of Father -   10  Family history of Heart Disease (V17 49)  Family History Reviewed: The family history was reviewed and updated today  Social History    · Consumes alcohol occasionally (V49 89) (Z78 9)   · Marital History - Currently    · Never a smoker   · No secondhand smoke exposure (V49 89) (Z78 9)   · Occupation: Retired  The social history was reviewed and updated today  The social history was reviewed and is unchanged  Surgical History   1  History of Complete Colonoscopy  2  History of Diagnostic Cystoscopy  3  History of Endovenous Ablation Of Incompetent Vein Laser  4  History of Hernia Repair  5   History of Stab Phlebectomy Of Varicose Veins 10-20 Stab Incisions  Surgical History Reviewed: The surgical history was reviewed and updated today  Current Meds   1  Centrum Silver Oral Tablet; TAKE 1 TABLET DAILY; Therapy: 73Pla3643 to Recorded  2  Fish Oil 1000 MG Oral Capsule; TAKE 1 CAPSULE DAILY IN THE MORNING Recorded  3  Vitamin C 250 MG Oral Tablet; Take 1 tablet daily Recorded     The medication list was reviewed and updated today  Allergies   1  IVP Dye  Denied   2  Bactrim TABS    Vitals    Recorded: 71TCR4101 09:43AM   Heart Rate 84, L Radial   Pulse Quality Regular, L Radial   Systolic 040   Diastolic 60   BP CUFF SIZE Large   Height 5 ft 10 in   Weight 177 lb    BMI Calculated 25 4   BSA Calculated 1 98     Physical Exam        Constitutional      General appearance: No acute distress, well appearing and well nourished  Pulmonary      Auscultation of lungs: Clear to auscultation, equal breath sounds bilaterally, no wheezes, no rales, no rhonci  Cardiovascular      Auscultation of heart: Normal rate and rhythm, normal S1 and S2, without murmurs  Skin      Skin and subcutaneous tissue: Abnormal  -- Papular rash involving back chest and abdomen with multiple inflamed papules  Future Appointments      Date/Time Provider Specialty Site   03/06/2018 03:00 PM CHERRY Santos  Urology  Carine PANIAGUA   05/10/2018 09:00 AM Lorin Randle, [facility]     Signatures    Electronically signed by : Dinah Chappell, [facility]; Elijah 15 2018  2:08PM EST                       (Author)     Electronically signed by :  CHERRY Villanueva ; Elijah 15 2018  5:34PM EST
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
## 2018-01-09 NOTE — RESULT NOTES
Verified Results  * XR HAND 3+ VIEW RIGHT 02Nov2016 03:20PM [facility] Order Number: [id number]     Test Name Result Flag Reference   XR HAND 3+ VW RIGHT (Report)     RIGHT HAND     INDICATION: Right posterior hand pain and swelling, patient fell Sunday  COMPARISON: None     VIEWS: 3; 3 images     For the purposes of institution wide universal language the following terms will apply: (thumb=1st digit/finger, index finger=2nd digit/finger, long finger=3rd digit/finger, ring=4th digit/finger and small finger=5th digit/finger)     FINDINGS:     There is no acute fracture or dislocation  Minor osteoarthritis 5th PIP joint  No lytic or blastic lesions are seen  Soft tissues are unremarkable  IMPRESSION:     No acute osseous abnormality         Workstation performed: [id number]     Signed by:   Netta Becker DO   11/3/16

## 2018-01-10 NOTE — H&P
Demographics     Zip Code: [id number]     Admission Date: 1/15/2016     Procedure: Stab phlebectomy of varicose veins, 10-20 stab incisions     Discharge Status: Alive  Risk Factors     Weight Height Units: US (pound - inch)  Procedure     Primary Indication: Varicose veins of lower extremities with ulcer [454 0]     Procedure Side: Left     Procedure Duration: 0

## 2018-01-11 NOTE — PROGRESS NOTES
Assessment    1  Postoperative state (V45 89) (Z98 89)   2  History of Endovenous Ablation Of Incompetent Vein Laser   3  History of Stab Phlebectomy Of Varicose Veins 10-20 Stab Incisions  Vascular Surgery Post-Op Vein Assessment [facility]:     Assessment: Post-op, the patient is doing well  Complications: The patient has no post-operative complications  Plan    1  Keep your leg elevated whenever you can to decrease swelling and increase circulation ;   Status:Complete;   Done: 10PBX5994   2  Follow-up visit in 6 weeks Evaluation and Treatment  Follow-up  Status: Hold For -   Scheduling  Requested for: 03YQN0030  Vascular Surgery Post-Op Vein Plan [facility]:     Plan: Activity Restrictions: advance as tolerated  Follow Up: This patient will be seen again in the office for follow up in 4-6 weeks  Pain Control: Patient will continue as needed for pain control The patient may apply warm compresses as needed to the thigh to help with post-operative pain   The patient should continue leg elevation periodically to help manage post-operative swelling  Discussion/Summary  Discussion Summary:   Mr Charly Bowie is doing well status post left EVLT with stab phlebectomies  He should continue to elevate to help manage postoperative edema  He may advance his activities as tolerated  I will see him in 6 weeks for reevaluation  If he has questions/concerns he may call the office  Chief Complaint  Chief Complaint Free Text Note Form: "I am following up from my vein surgery "      Active Problems    1  Acute conjunctivitis (372 00) (H10 30)   2  Atopic dermatitis (691 8) (L20 9)   3  Basal cell carcinoma of skin (173 91) (C44 91)   4  Basal cell carcinoma of upper extremity (173 61) (C44 611)   5  Benign prostatic hypertrophy (600 00) (N40 0)   6  Diverticulosis (562 10) (K57 90)   7  Epistaxis (784 7) (R04 0)   8  Erectile dysfunction (607 84) (N52 9)   9  Hematuria (599 70) (R31 9)   10   Hyperglycemia (790 29) (R73 9)   11  Melanoma in situ (172 9) (D03 9)   12  Peripheral arterial disease (443 9) (I73 9)   13  Peripheral neuropathy (356 9) (G62 9)   14  Peripheral vascular disease (443 9) (I73 9)   15  Preop cardiovascular exam (V72 81) (Z01 810)   16  Right rotator cuff tear (840 4) (M75 101)   17  Rotator cuff tendinitis (726 10) (M75 80)   18  Skin ulcer (707 9) (L98 499)   19  Squamous Cell Carcinoma Of The Skin (173 92)   20  Tinea cruris (110 3) (B35 6)   21  Triceps strain, left, initial encounter (840 8) (S46 312A)   22  Varicose veins of left lower extremity with other complications (791 2) (A75 802)   23  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)   24  Vasovagal syncope (780 2) (R55)   25  Visual disturbances (368 9) (H53 9)    Current Meds   1  Aspirin 81 MG Oral Tablet; TAKE 1 TABLET DAILY; Therapy: 12Ijz9507 to Recorded   2  Centrum Silver Oral Tablet; TAKE 1 TABLET DAILY; Therapy: 26Qje8734 to Recorded   3  Lidocaine HCl - 4 % External Solution; Apply PRN to wound prior to debridement for pain   control at [facility]; Therapy: (Recorded:16Ybz6085) to Recorded    Allergies    1  IVP Dye  Denied    2  Bactrim TABS    Vitals  Signs [Data Includes: Current Encounter]   Recorded: 97FEG1123 10:38AM   Heart Rate: 78, L Radial  Pulse Quality: Normal, L Radial  Respiration: 16  Respiration Quality: Normal  Systolic: 676, LUE, Sitting  Diastolic: 78, LUE, Sitting  Height: 5 ft 10 in  Weight: 173 lb 2 oz  BMI Calculated: 24 84  BSA Calculated: 1 96    Post Op  Post Op [facility]: Mr  Wendi Cross is here today for postop visit status post left EVLT with stab phlebectomies on 1/15/2016 by Dr Alf Barrera  He reports feeling slight discomfort to the left thigh  Denies any pain, fever or chills  He has minimal edema to the left thigh and slight ecchymosis  Vascular Surgery Post-OP Vein [facility]:      This patient is post-op endovenous laser ablation of the left greater saphenous vein and multiple stab phlebectomies of the left 10-20  This surgery was performed by Dr Pradip Sandoval on 1/15/2016  HPI: Post-Operative Symptoms:   PE: Healed: The Micropuncture site is well healed  The incisions are healing well  Hyperpigmentation to the left lower extremity  Skin intact, no venous ulcers  Easily palpable DP pulse        Signatures   Electronically signed by : Marie Lam; Jan 18 2016 11:05AM EST                       (Author)    Electronically signed by : Mayi Abraham MD; Jan 19 2016  7:49PM EST

## 2018-01-12 NOTE — MISCELLANEOUS
Provider Comments  Provider Comments:   Patient did not show for scheduled hospital followup today        Signatures   Electronically signed by : Nehemias Liang [facility]; Jun 16 2016  4:13PM EST                       (Author)    Electronically signed by : Didier Hernández DO; Jun 16 2016  4:26PM EST

## 2018-01-16 NOTE — H&P
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

## 2018-01-20 NOTE — PROGRESS NOTES
Assessment   1  Sinusitis, acute (461 9) (J01 90)    Plan   Sinusitis, acute    · Azithromycin 250 MG Oral Tablet; TAKE 2 TABLETS ON DAY 1 THEN TAKE 1    TABLET A DAY FOR 4 DAYS    Discussion/Summary      1  Acute sinusitis-recommended Zithromax Z-Arturo as directed  The patient was also advised to use Flonase, 2 sprays in each nostril daily  They may use over-the-counter NSAIDs such as ibuprofen as necessary for pressure  Follow-up in the next 4-5 days if not improved  Chief Complaint   C/o- sinus congestion, headache, post nasal drip x 3-4 days  [facility] [place] of Present Illness   HPI: This is an 59-year-old gentleman that presents to the office with 4 days of worsening sinus congestion, head pressure, generalized fatigue and malaise  He has not been having relief with over-the-counter medications  He has also noted some constipation despite trying to drink plenty of fluids and taking a stool softener  He he has not had any swelling of his extremities  He has not had any chest pains or increased shortness of breath  He denies much cough or chest congestion and has not had any fever  Review of Systems        Constitutional: feeling poorly-- and-- feeling tired, but-- no fever-- and-- no chills  ENT: earache,-- sore throat-- and-- nasal discharge  Cardiovascular: no chest pain-- and-- no palpitations  Respiratory: no cough-- and-- no shortness of breath during exertion  Gastrointestinal: no abdominal pain,-- no nausea,-- no constipation-- and-- no diarrhea  Musculoskeletal: no arthralgias,-- no joint swelling-- and-- no myalgias  Neurological: headache  Active Problems   1  Acute conjunctivitis (372 00) (H10 30)   2  Acute pain of right foot (729 5) (M79 671)   3  Atopic dermatitis (691 8) (L20 9)   4  Balance problems (781 99) (R26 89)   5  Basal cell carcinoma of skin (173 91) (C44 91)   6  Basal cell carcinoma of upper extremity (173 61) (C44 611)   7   Blood pressure check (V81 1) (Z01 30)   8  Contusion of hand, right (923 20) (S60 221A)   9  Contusion of right shoulder (923 00) (S40 011A)   10  Contusion of right wrist (923 21) (S60 211A)   11  Diverticulosis (562 10) (K57 90)   12  Encounter for prostate cancer screening (V76 44) (Z12 5)   13  Enlarged prostate without lower urinary tract symptoms (luts) (600 00) (N40 0)   14  Epistaxis (784 7) (R04 0)   15  Erectile dysfunction (607 84) (N52 9)   16  Folliculitis (197 7) (H57 7)   17  Hematuria (599 70) (R31 9)   18  Hematuria, gross (599 71) (R31 0)   19  Hyperglycemia (790 29) (R73 9)   20  Laceration of oral cavity, subsequent encounter (V58 89,873 60) (S01 512D)   21  Melanoma in situ (172 9) (D03 9)   22  Multiple contusions (924 8) (T07  XXXA)   23  Neck muscle spasm (728 85) (M62 838)   24  Neck pain (723 1) (M54 2)   25  Need for immunization against influenza (V04 81) (Z23)   26  Need for pneumococcal vaccination (V03 82) (Z23)   27  Peripheral arterial disease (443 9) (I73 9)   28  Peripheral neuropathy (356 9) (G62 9)   29  Peripheral vascular disease (443 9) (I73 9)   30  Postoperative state (V45 89) (Z98 890)   31  Preop cardiovascular exam (V72 81) (Z01 810)   32  Right rotator cuff tear (840 4) (M75 101)   33  Rotator cuff tendinitis (726 10) (M75 80)   34  Sepsis secondary to UTI (038 9,995 91,599 0) (A41 9,N39 0)   35  Skin ulcer (707 9) (L98 499)   36  Squamous Cell Carcinoma Of The Skin (173 92)   37  Tinea cruris (110 3) (B35 6)   38  Triceps strain, left, initial encounter (840 8) (S46 312A)   39  Urinary retention (788 20) (R33 9)   40  UTI (urinary tract infection) (599 0) (N39 0)   41  Varicose veins of left lower extremity with other complications (281 2) (Q38 177)   42  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)   43  Vasovagal syncope (780 2) (R55)   44  Visual disturbances (368 9) (H53 9)    Past Medical History   1  History of Abscess of face (682 0) (L02 01)   2   History of Herpes zoster (053 9) (B02 9)  Active Problems And Past Medical History Reviewed: The active problems and past medical history were reviewed and updated today  Family History   Mother    1  Family history of Coronary Artery Disease (V17 49)   2  Family history of Family Health Status Of Mother -    3  Family history of Stroke Syndrome (V17 1)  Father    4  Family history of Coronary Artery Disease (V17 49)   5  Family history of Family Health Status Of Father -    10  Family history of Heart Disease (V17 49)    Social History    · Consumes alcohol occasionally (V49 89) (Z78 9)   · Marital History - Currently    · Never a smoker   · No secondhand smoke exposure (V49 89) (Z78 9)   · Occupation: Retired    Surgical History   1  History of Complete Colonoscopy   2  History of Diagnostic Cystoscopy   3  History of Endovenous Ablation Of Incompetent Vein Laser   4  History of Hernia Repair   5  History of Stab Phlebectomy Of Varicose Veins 10-20 Stab Incisions    Current Meds    1  Centrum Silver Oral Tablet; TAKE 1 TABLET DAILY; Therapy: [id number] to Recorded   2  Fish Oil 1000 MG Oral Capsule; TAKE 1 CAPSULE DAILY IN THE MORNING Recorded   3  Vitamin C 250 MG Oral Tablet; Take 1 tablet daily Recorded     The medication list was reviewed and updated today  Allergies   1  IVP Dye  Denied    2  Bactrim TABS    Vitals    Recorded: 15ZAO8962 12:51PM   Temperature 97 1 F, Tympanic   Heart Rate 72, L Radial   Pulse Quality Normal, L Radial   Respiration Quality Normal   Respiration 16   Systolic 963, LUE, Sitting   Diastolic 82, LUE, Sitting   Height 5 ft 10 in   Weight 175 lb 6 4 oz   BMI Calculated 25 17   BSA Calculated 1 97     Physical Exam        Constitutional      General appearance: No acute distress, well appearing and well nourished  Eyes      Conjunctiva and lids: No swelling, erythema, or discharge  Pupils and irises: Equal, round and reactive to light         Ears, Nose, Mouth, and Throat      External inspection of ears and nose: Normal        Otoscopic examination: Tympanic membrance translucent with normal light reflex  Canals patent without erythema  Nasal mucosa, septum, and turbinates: Abnormal  -- Turbinates are erythematous and edematous bilaterally  Oropharynx: Normal with no erythema, edema, exudate or lesions  Pulmonary      Respiratory effort: No increased work of breathing or signs of respiratory distress  Auscultation of lungs: Clear to auscultation, equal breath sounds bilaterally, no wheezes, no rales, no rhonci  Cardiovascular      Auscultation of heart: Normal rate and rhythm, normal S1 and S2, without murmurs  Examination of extremities for edema and/or varicosities: Normal        Abdomen      Abdomen: Abnormal  -- Bowel sounds are hypoactive but present x4  Liver and spleen: No hepatomegaly or splenomegaly  Musculoskeletal      Gait and station: Normal        Digits and nails: Normal without clubbing or cyanosis  Neurologic      Reflexes: 2+ and symmetric  Sensation: No sensory loss  Psychiatric      Mood and affect: Normal           Future Appointments      Date/Time Provider Specialty Site   03/06/2018 03:00 PM CHERRY Torres   Urology [address]   05/10/2018 09:00 AM Andrez Amaral [facility] AND [facility]     Signatures    Electronically signed by : Valeriano Chapman, [facility]; Jan 17 2018  1:06PM EST                       (Author)     Electronically signed by : CHERRY Joe ; Jan 19 2018  9:45AM EST                       (Author)

## 2018-01-21 NOTE — PROGRESS NOTES
Assessment   1  Allergic rhinitis (477 9) (J30 9)   2  ETD (eustachian tube dysfunction) (381 81) (H69 80)   3  Otitis media (382 9) (H66 90)   4  Sinusitis, acute (461 9) (J01 90)    Plan   Allergic rhinitis, ETD (eustachian tube dysfunction), Otitis media, Sinusitis, acute    · Follow-up PRN Evaluation and Treatment  Follow-up  Status: Complete  Done:    [id number] 11:12AM  Sinusitis, acute    · Azithromycin 250 MG Oral Tablet    Discussion/Summary      1  Acute sinusitis, Zithromax was discontinued Ceftin was ordered Otitis media, both infectious and serous, Ceftin was ordered prednisone was ordered Astelin nasal spray was ordered Per allergic rhinitis, Astelin nasal spray was ordered Eustachian tube dysfunction  Prednisone was ordered Return to office in 1 week if still symptoms  Possible side effects of new medications were reviewed with the patient/guardian today  The treatment plan was reviewed with the patient/guardian  The patient/guardian understands and agrees with the treatment plan       Self Referrals: No      Chief Complaint   c/o continues with sinus infection  Feels worse  Finished the antibiotic  History of Present Illness   HPI: Patient was seen on the 17th diagnosis acute sinusitis Z-Arturo was prescribed  Patient has been using this however patient still has sinus pain and pressure head congestion sneezing the patient has fever chills cough or wheeze  No fever chills      Review of Systems        Constitutional: as noted in HPI       ENT: as noted in HPI  Cardiovascular: no complaints of slow or fast heart rate, no chest pain, no palpitations, no leg claudication or lower extremity edema  Respiratory: as noted in HPI  Gastrointestinal: no complaints of abdominal pain, no constipation, no nausea or vomiting, no diarrhea or bloody stools  Genitourinary: no complaints of dysuria or incontinence, no hesitancy, no nocturia, no genital lesion, no inadequacy of penile erection  Musculoskeletal: no complaints of arthralgia, no myalgia, no joint swelling or stiffness, no limb pain or swelling  Integumentary: no complaints of skin rash or lesion, no itching or dry skin, no skin wounds  Neurological: no complaints of headache, no confusion, no numbness or tingling, no dizziness or fainting  Active Problems   1  Balance problems (781 99) (R26 89)   2  Basal cell carcinoma of skin (173 91) (C44 91)   3  Basal cell carcinoma of upper extremity (173 61) (C44 611)   4  Diverticulosis (562 10) (K57 90)   5  Encounter for prostate cancer screening (V76 44) (Z12 5)   6  Enlarged prostate without lower urinary tract symptoms (luts) (600 00) (N40 0)   7  Erectile dysfunction (607 84) (N52 9)   8  Folliculitis (191 6) (F66 5)   9  Hematuria (599 70) (R31 9)   10  Hematuria, gross (599 71) (R31 0)   11  Hyperglycemia (790 29) (R73 9)   12  Melanoma in situ (172 9) (D03 9)   13  Peripheral arterial disease (443 9) (I73 9)   14  Peripheral neuropathy (356 9) (G62 9)   15  Peripheral vascular disease (443 9) (I73 9)   16  Sinusitis, acute (461 9) (J01 90)   17  Squamous Cell Carcinoma Of The Skin (173 92)   18  Triceps strain, left, initial encounter (840 8) (S46 312A)   19  Urinary retention (788 20) (R33 9)   20  Varicose veins of left lower extremity with other complications (192 6) (X35 615)   21  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)    Family History   Mother    1  Family history of Coronary Artery Disease (V17 49)   2  Family history of Family Health Status Of Mother -    3  Family history of Stroke Syndrome (V17 1)  Father    4  Family history of Coronary Artery Disease (V17 49)   5  Family history of Family Health Status Of Father -    10  Family history of Heart Disease (V17 49)  Family History Reviewed: The family history was reviewed and updated today         Social History    · Consumes alcohol occasionally (V49 89) (Z78 9)   · Marital History - Currently    · Never a smoker   · No secondhand smoke exposure (V49 89) (Z78 9)   · Occupation: Retired  The social history was reviewed and updated today  Surgical History   1  History of Complete Colonoscopy   2  History of Diagnostic Cystoscopy   3  History of Endovenous Ablation Of Incompetent Vein Laser   4  History of Hernia Repair   5  History of Stab Phlebectomy Of Varicose Veins 10-20 Stab Incisions  Surgical History Reviewed: The surgical history was reviewed and updated today  Current Meds    1  Azithromycin 250 MG Oral Tablet; TAKE 2 TABLETS ON DAY 1 THEN TAKE 1 TABLET A     DAY FOR 4 DAYS; Therapy: [id number] to (Last Rx:[id number])  Requested for: [id number] Ordered   2  Centrum Silver Oral Tablet; TAKE 1 TABLET DAILY; Therapy: [id number] to Recorded   3  Fish Oil 1000 MG Oral Capsule; TAKE 1 CAPSULE DAILY IN THE MORNING Recorded   4  Vitamin C 250 MG Oral Tablet; Take 1 tablet daily Recorded     The medication list was reviewed and updated today  Allergies   1  IVP Dye  Denied    2  Bactrim TABS    Vitals    Recorded: 74OVE9661 11:03AM   Temperature 36 0 F   Systolic 381   Diastolic 74   Height 5 ft 10 in   Weight 175 lb    BMI Calculated 25 11   BSA Calculated 1 97     Physical Exam        Constitutional      General appearance: No acute distress, well appearing and well nourished  Eyes      Conjunctiva and lids: No swelling, erythema, or discharge  Ears, Nose, Mouth, and Throat      External inspection of ears and nose: Normal        Otoscopic examination: Abnormal  -- Both tympanic membranes dull with fluid right TM with mild injection  Nasal mucosa, septum, and turbinates: Abnormal  -- Positive allergic turbinates positive pansinus tenderness to percussion  Oropharynx: Abnormal  -- Off white postnasal drip minimal injection negative exudate  Pulmonary      Respiratory effort: No increased work of breathing or signs of respiratory distress  Auscultation of lungs: Clear to auscultation, equal breath sounds bilaterally, no wheezes, no rales, no rhonci  Cardiovascular      Palpation of heart: Normal PMI, no thrills  Auscultation of heart: Normal rate and rhythm, normal S1 and S2, without murmurs  Lymphatic      Palpation of lymph nodes in neck: Abnormal  -- Positive shotty anterior nodes  Musculoskeletal      Gait and station: Normal        Skin Warm and dry  Neurologic Negative gross focal motor deficit  Future Appointments      Date/Time Provider Specialty Site   03/06/2018 03:00 PM CHERRY Barahona   [facility]   05/10/2018 09:00 AM Vicki Barreto, [facility] AND [facility]     Signatures    Electronically signed by : Froy Bright DO; Jan 20 2018 11:13AM EST                       (Author)

## 2018-01-29 PROBLEM — J30.9 ALLERGIC RHINITIS: Status: ACTIVE | Noted: 2018-01-01

## 2018-01-29 PROBLEM — R26.89 BALANCE PROBLEMS: Status: ACTIVE | Noted: 2017-01-01

## 2018-01-29 PROBLEM — L73.9 FOLLICULITIS: Status: ACTIVE | Noted: 2018-01-01

## 2018-01-29 NOTE — PROGRESS NOTES
Assessment/Plan:    No problem-specific Assessment & Plan notes found for this encounter  Diagnoses and all orders for this visit:    Dizziness    Labyrinthitis, unspecified laterality    Allergic rhinitis, unspecified chronicity, unspecified seasonality, unspecified trigger    Other sinusitis, unspecified chronicity    Balance problems        1  Sinusitis status post Zithromax and Ceftin and no signs of acute bacterial infection  We will check sinus x-ray rule out chronic sinusitis  2  Allergic rhinitis, continue Astelin nasal spray  3  Dizziness 4  Labyrinthitis 5  Balance problems we will start prednisone 20 mg daily on a tapering dose  6  Recheck in 2 weeks if still with symptomatology will refer to ENT for further evaluation and treatment  Subjective:      Patient ID: Kaila Morris is a 80 y o  male  Pt here with c/o- sinus pressure,  Congestion  X 2-3 weeks and not getting better  Patient was seen on 01/17 and 1/20 for acute sinusitis he was on Zithromax and Ceftin  Patient states he is doing much better but not progressing as quickly as I would like  Patient still with head congestion nasal congestion no fever chills headache patient states occasionally does feel dizzy especially when moving had no syncope or near-syncope no chest pain shortness of breath nausea vomiting palpitations or diaphoresis  Patient is continue use Astelin nasal spray as prescribed twice daily        The following portions of the patient's history were reviewed and updated as appropriate: allergies, current medications, past family history, past medical history, past social history, past surgical history and problem list     Review of Systems   Constitutional:        Per HPI   HENT:        Per HPI   Eyes: Negative  Respiratory: Negative  Cardiovascular: Negative  Gastrointestinal: Negative  Endocrine: Negative  Genitourinary: Negative  Musculoskeletal: Negative  Skin: Negative  Allergic/Immunologic: Negative  Neurological:        Per HPI   Hematological: Negative  Psychiatric/Behavioral: Negative  Objective:     Physical Exam   Constitutional: He is oriented to person, place, and time  He appears well-developed and well-nourished  HENT:   Head: Normocephalic and atraumatic  Right Ear: External ear normal    Left Ear: External ear normal    Positive allergic turbinates negative sinus tenderness to percussion positive scant clear postnasal drip negative pharyngeal injection or exudate   Eyes: Conjunctivae and EOM are normal  Pupils are equal, round, and reactive to light  Neck: Neck supple  Cardiovascular: Normal rate, regular rhythm and normal heart sounds  Carotid artery without bruit bilateral   Pulmonary/Chest: Effort normal and breath sounds normal    Musculoskeletal: He exhibits no edema  Neurological: He is alert and oriented to person, place, and time  He has normal reflexes  He displays normal reflexes  No cranial nerve deficit  He exhibits normal muscle tone  Coordination normal    Skin: Skin is warm and dry  Psychiatric: He has a normal mood and affect

## 2018-02-12 NOTE — PROGRESS NOTES
Assessment/Plan:    Atopic dermatitis  Patient should use moisturizer    Balance problems  Currently asymptomatic    Allergic rhinitis  Stable continue present therapy       Diagnoses and all orders for this visit:    Hiccup  Comments: Will prescribed Thorazine as needed risk and benefit especially drowsiness discussed  Orders:  -     chlorproMAZINE (THORAZINE) 10 mg tablet; Take 1 tablet 4 times daily as needed for hiccup    Atopic dermatitis, unspecified type    Balance problems    Allergic rhinitis, unspecified chronicity, unspecified seasonality, unspecified trigger          Subjective:      Patient ID: Chago Sarmiento is a 80 y o  male  Cc: Follow up on sinus infection  Pt states he feel better  Rash lower back x 2 month  And wants to discuss having hiccups during the day  R Elvin    Patient's balance issues and sinusitis symptoms have completely resolved  Patient does have a history of atopic dermatitis on his back and he is under treatment with Idania Swenson for this  Patient Thursday Friday and Saturday had intractable hiccups  However they did go away when he fell asleep  Explained is not much to do Thorazine is the only medication that is shown to help it has not improved but it may help  Side effects were discussed patient will accept the prescription for this  At the present time patient has no hiccups        The following portions of the patient's history were reviewed and updated as appropriate: allergies, current medications, past family history, past medical history, past social history, past surgical history and problem list     Review of Systems   Constitutional:        Per HPI   HENT:        Per HPI   Eyes: Negative  Respiratory:        Per HPI   Cardiovascular: Negative  Gastrointestinal: Negative  Endocrine: Negative  Genitourinary: Negative  Musculoskeletal: Negative  Skin: Negative  Allergic/Immunologic: Negative      Neurological:        Per HPI   Hematological: Negative  Psychiatric/Behavioral: Negative  Objective:    Vitals:    02/12/18 0833   BP: 118/68   BP Location: Left arm   Patient Position: Sitting   Cuff Size: Standard   Pulse: 64   Resp: 16   Weight: 76 2 kg (168 lb)   Height: 5' 10" (1 778 m)      Physical Exam   Constitutional: He is oriented to person, place, and time  He appears well-developed and well-nourished  HENT:   Head: Normocephalic and atraumatic  Left Ear: External ear normal    Nose: Nose normal    Mouth/Throat: Oropharynx is clear and moist    Eyes: Conjunctivae and EOM are normal  Pupils are equal, round, and reactive to light  Neck: Neck supple  Cardiovascular: Normal rate, regular rhythm and normal heart sounds  Pulmonary/Chest: Effort normal and breath sounds normal    Musculoskeletal:   Gait normal   Neurological: He is alert and oriented to person, place, and time  No cranial nerve deficit  Skin: Skin is warm and dry  Scan of back is mildly xerotic but negative rash or dermatitis is noticed   Psychiatric: He has a normal mood and affect

## 2018-02-12 NOTE — PATIENT INSTRUCTIONS
If Thorazine ineffective for hiccups call office, patient to follow up with usual primary care provider per that recommendation

## 2018-02-14 NOTE — TELEPHONE ENCOUNTER
Patient's daughter called Kevin because patient was seen on 02/12/18 and was given an RX for the hiccups that he is having  He is on day 2 of the medication and she doesn't feel that it is helping him any  She is also concerned because since the last time patient had a sinus infection he sleeps a lot and also is always exhausted  She wanted to know if there is any tests that could possibly be done to understand why the hiccup problem is happening?  Call patient's daughter Kevin with any information from the

## 2018-02-15 NOTE — TELEPHONE ENCOUNTER
PT'S WIFE CALLED AND WOULD PLEASE LIKE A CALL BACK  HIS HICCUPS ARE WORSE SOMETIMES AND NOT SO BAD OTHER TIMES  DOES HE NEED A STRONGER DOSE BECAUSE THIS HAS BEEN ONGOING FOR A WEEK NOW  HE HAS BEEN TO THE DOCTOR BUT IT SEEMS LIKE THE MEDS ARE NOT WORKING TOO WELL  SHE ASKED FOR A CALL BACK PLEASE ASAP  THANK YOU

## 2018-02-15 NOTE — TELEPHONE ENCOUNTER
Spoke with Pt  And Pt  Wife, they are aware  of recommendations and will try to increase medication, if no relief pt   Will be seen at the ER

## 2018-02-19 PROBLEM — N17.9 AKI (ACUTE KIDNEY INJURY) (HCC): Status: ACTIVE | Noted: 2018-01-01

## 2018-02-19 PROBLEM — D69.6 THROMBOCYTOPENIA (HCC): Status: ACTIVE | Noted: 2018-01-01

## 2018-02-19 PROBLEM — D72.821 MONOCYTOSIS: Status: ACTIVE | Noted: 2018-01-01

## 2018-02-19 PROBLEM — M79.10 MYALGIA: Status: ACTIVE | Noted: 2018-01-01

## 2018-02-19 PROBLEM — R65.10 SIRS (SYSTEMIC INFLAMMATORY RESPONSE SYNDROME) (HCC): Status: ACTIVE | Noted: 2018-01-01

## 2018-02-19 PROBLEM — R23.8 GENERALIZED SKIN PAPULES: Status: ACTIVE | Noted: 2018-01-01

## 2018-02-19 PROBLEM — D64.9 ANEMIA: Status: ACTIVE | Noted: 2018-01-01

## 2018-02-19 PROBLEM — I63.81 LACUNAR INFARCTION (HCC): Status: ACTIVE | Noted: 2018-01-01

## 2018-02-19 PROBLEM — R53.83 LETHARGIC: Status: ACTIVE | Noted: 2018-01-01

## 2018-02-19 PROBLEM — R53.83 LETHARGY: Status: ACTIVE | Noted: 2018-01-01

## 2018-02-19 PROBLEM — R50.9 FEVER: Status: ACTIVE | Noted: 2018-01-01

## 2018-02-19 NOTE — ED PROVIDER NOTES
History  Chief Complaint   Patient presents with    Fever - 75 years or older     Pt reports generalized weakness and fever for 1 wekk  Pt states he also has a rash on his back   Per pt he had a sinus infection a week ago  Ptdeneis nausea/sob/cp     This is an 19-year-old male that presents today with fever and lethargy  Patient states he does not have any medical problems and for the past week he has been feeling very tired and fatigued  States he has had a fever for the past week for which she has not been taking anything  Wife states he has been very lethargic barely eating or drinking anything  Patient denies any chest pain or shortness of breath  He does states he has been having a cough with greenish sputum production  States no abdominal pain urinary complaints numbness or tingling  Denies any headaches or neck pain  Patient does state he ever he has a rash on his back which she had about 6 weeks ago evaluated by PCP was told it was dermatitis he states he took some steroids which improved  He states he came back again and itchy  Denies any drainage  Today patient followed up with PCP advise him to come to the ER because patient appeared very lethargic and weak and had a fever  Will do a septic workup  May patient the hospital IV fluids              Prior to Admission Medications   Prescriptions Last Dose Informant Patient Reported? Taking? Ascorbic Acid (VITAMIN C) 100 MG tablet   Yes Yes   Sig: Take 100 mg by mouth daily  Multiple Vitamins-Minerals (CENTRUM SILVER 50+MEN PO)   Yes Yes   Sig: Take 1 tablet by mouth daily   Omega-3 Fatty Acids (FISH OIL) 1,000 mg   Yes Yes   Sig: Take 1 capsule by mouth      Facility-Administered Medications: None       History reviewed  No pertinent past medical history  Past Surgical History:   Procedure Laterality Date    COLONOSCOPY N/A 2012    COLONOSCOPY  01/2013    No repeat is recommended  Findings were diverticulosis and mild hemorrhoids       CYSTOSCOPY      onset: 05/06/2016; diagnostic    HERNIA REPAIR  12/09/2003    AR ENDOVENOUS LASER, 1ST VEIN Left 1/15/2016    Procedure: ENDOVASCULAR LASER THERAPY (EVLT) with multiple stab phlebectomies-between 10-20; Surgeon: Gertrude Harp MD;  Location[facility];  Service: Vascular    AR PHLEB VEINS - EXTREM - TO 20 Left 1/15/2016    Procedure: LEG ENDOVENOUS LASER OBLITERATION GREATER SAPHENOUS VEIN WITH MULTIPLE VEIN LIGATIONS ;  Surgeon: Gertrude Harp MD;  [facility];  Service: Vascular    TONSILLECTOMY N/A        Family History   Problem Relation Age of Onset    Coronary artery disease Mother     Stroke Mother     Coronary artery disease Father     Heart disease Father      I have reviewed and agree with the history as documented  Social History   Substance Use Topics    Smoking status: Never Smoker    Smokeless tobacco: Never Used    Alcohol use Yes      Comment: 4 drinks per month        Review of Systems   Constitutional: Positive for activity change, appetite change, chills, fatigue and fever  HENT: Negative  Eyes: Negative  Respiratory: Positive for cough  Negative for chest tightness  Cardiovascular: Negative  Gastrointestinal: Negative  Endocrine: Negative  Genitourinary: Negative  Musculoskeletal: Negative  Skin: Positive for rash  Neurological: Negative  Hematological: Negative  Psychiatric/Behavioral: Negative  All other systems reviewed and are negative        Physical Exam  ED Triage Vitals   Temperature Pulse Respirations Blood Pressure SpO2   02/19/18 1622 02/19/18 1623 02/19/18 1623 02/19/18 1623 02/19/18 1623   (!) 101 °F (38 3 °C) 100 18 135/64 96 %      Temp Source Heart Rate Source Patient Position - Orthostatic VS BP Location FiO2 (%)   02/19/18 1622 02/19/18 1623 02/19/18 1623 02/19/18 1623 --   Temporal Monitor Sitting Right arm       Pain Score       02/19/18 1846       No Pain           Orthostatic Vital Signs  Vitals: 02/19/18 1623 02/19/18 1845 02/19/18 2018 02/19/18 2255   BP: 135/64 120/65 124/69 94/53   Pulse: 100 85 74 74   Patient Position - Orthostatic VS: Sitting Lying Lying Lying       Physical Exam   Constitutional: He is oriented to person, place, and time  He appears well-developed  No distress  Patient appears lethargic in nature   HENT:   Head: Normocephalic  Nose: Nose normal    Mouth/Throat: Oropharynx is clear and moist    Eyes: Conjunctivae and EOM are normal  Pupils are equal, round, and reactive to light  Neck: Normal range of motion  Neck supple  Cardiovascular: Normal rate, regular rhythm and normal heart sounds  No murmur heard  Pulmonary/Chest: Effort normal and breath sounds normal    Abdominal: Soft  Bowel sounds are normal  He exhibits no distension  There is no tenderness  Musculoskeletal: Normal range of motion  He exhibits no edema or deformity  Neurological: He is alert and oriented to person, place, and time  Normal sensory and motor exam   Skin: Skin is warm  Capillary refill takes less than 2 seconds  Rash noted  He is diaphoretic  Papules with varying size on the back with some crusting  Appears to be chronic in nature rash on the back    Psychiatric: He has a normal mood and affect  Vitals reviewed        ED Medications  Medications   acetaminophen (TYLENOL) tablet 650 mg (not administered)   hydrOXYzine HCL (ATARAX) tablet 25 mg (not administered)   cefepime (MAXIPIME) IVPB (premix) 1,000 mg (1,000 mg Intravenous New Bag 2/19/18 2255)   azithromycin (ZITHROMAX) 500 mg in sodium chloride 0 9% 250mL IVPB 500 mg (not administered)   benzonatate (TESSALON PERLES) capsule 100 mg (not administered)   sodium chloride 0 9 % infusion (75 mL/hr Intravenous New Bag 2/19/18 2255)   acetaminophen (TYLENOL) tablet 650 mg (650 mg Oral Given 2/19/18 1646)   sodium chloride 0 9 % bolus 1,000 mL (0 mL Intravenous Stopped 2/19/18 1844)       Diagnostic Studies  Results Reviewed Procedure Component Value Units Date/Time    Protime-INR [[id number]]  (Abnormal) Collected:  02/19/18 2310    Lab Status:  Final result Specimen:  Blood from Arm, Left Updated:  02/20/18 0005     Protime 16 9 (H) seconds      INR 1 37 (H)    APTT [[id number]]  (Abnormal) Collected:  02/19/18 2310    Lab Status:  Final result Specimen:  Blood from Arm, Left Updated:  02/20/18 0005     PTT 46 (H) seconds     Narrative: Therapeutic Heparin Range = 60-90 seconds    Leukemia/Lymphoma flow cytometry [[id number]] Collected:  02/19/18 2318    Lab Status: In process Specimen:  Blood from Arm, Left Updated:  02/19/18 2323    Sodium, urine, random [[id number]] Collected:  02/19/18 2309    Lab Status: In process Specimen:  Urine from Urine, Clean Catch Updated:  02/19/18 2319    Creatinine, urine, random [[id number]] Collected:  02/19/18 2309    Lab Status:   In process Specimen:  Urine from Urine, Clean Catch Updated:  02/19/18 2319    Protime-INR [[id number]]     Lab Status:  No result Specimen:  Blood     APTT [[id number]]     Lab Status:  No result Specimen:  Blood     Urine Microscopic [[id number]]  (Abnormal) Collected:  02/19/18 1846    Lab Status:  Final result Specimen:  Urine from Urine, Clean Catch Updated:  02/19/18 1925     RBC, UA 10-20 (A) /hpf      WBC, UA 2-4 (A) /hpf      Epithelial Cells Occasional /hpf      Bacteria, UA Occasional /hpf      Fine granular casts 0-1 /lpf     POCT urinalysis dipstick [[id number]]  (Abnormal) Resulted:  02/19/18 1859    Lab Status:  Final result Specimen:  Urine Updated:  02/19/18 1859    ED Urine Macroscopic [[id number]]  (Abnormal) Collected:  02/19/18 1846    Lab Status:  Final result Specimen:  Urine Updated:  02/19/18 1845     Color, UA Yellow     Clarity, UA Clear     pH, UA 7 0     Leukocytes, UA Negative     Nitrite, UA Negative     Protein,  (2+) (A) mg/dl      Glucose, UA Negative mg/dl      Ketones, UA Negative mg/dl      Urobilinogen, UA 0 2 E U /dl      Bilirubin, UA Negative     Blood, UA Moderate (A)     Specific [place], UA 1 010    Narrative[place]indy Lick RESULT    Path Slide Review [[id number]] Collected:  02/19/18 1801    Lab Status: In process Specimen:  Blood Updated:  02/19/18 1801    Comprehensive metabolic panel [[id number]]  (Abnormal) Collected:  02/19/18 1642    Lab Status:  Final result Specimen:  Blood from Arm, Left Updated:  02/19/18 1756     Sodium 134 (L) mmol/L      Potassium 4 1 mmol/L      Chloride 100 mmol/L      CO2 22 mmol/L      Anion Gap 12 mmol/L      BUN 18 mg/dL      Creatinine 1 33 (H) mg/dL      Glucose 125 mg/dL      Calcium 8 2 (L) mg/dL      AST 40 U/L      ALT 25 U/L      Alkaline Phosphatase 79 U/L      Total Protein 7 7 g/dL      Albumin 3 2 (L) g/dL      Total Bilirubin 0 75 mg/dL      eGFR 48 ml/min/1 73sq m     Narrative:         National Kidney Disease Education Program recommendations are as follows:  GFR calculation is accurate only with a steady state creatinine  Chronic Kidney disease less than 60 ml/min/1 73 sq  meters  Kidney failure less than 15 ml/min/1 73 sq  meters  CBC and differential [[id number]]  (Abnormal) Collected:  02/19/18 1642    Lab Status:  Final result Specimen:  Blood from Arm, Left Updated:  02/19/18 1743     WBC 97 80 (HH) Thousand/uL      RBC 2 30 (L) Million/uL      Hemoglobin 7 4 (L) g/dL      Hematocrit 21 3 (L) %      MCV 93 fL      MCH 32 2 pg      MCHC 34 7 g/dL      RDW 14 1 %      MPV 10 3 fL      Platelets 26 (LL) Thousands/uL      nRBC 0 /100 WBCs     Lactic acid x2 [[id number]]  (Normal) Collected:  02/19/18 1642    Lab Status:  Final result Specimen:  Blood from Arm, Left Updated:  02/19/18 1741     LACTIC ACID 1 3 mmol/L     Narrative:         Result may be elevated if tourniquet was used during collection  Influenza A/B and RSV by PCR (indicated for patients >2 mo of age) [[id number]] Collected:  02/19/18 1708    Lab Status:   In process Specimen:  Nasopharyngeal from Nasopharyngeal Swab Updated: 02/19/18 1715    Blood culture #1 [[id number]] Collected:  02/19/18 1642    Lab Status: In process Specimen:  Blood from Arm, Left Updated:  02/19/18 1646    Blood culture #2 [[id number]] Collected:  02/19/18 1642    Lab Status: In process Specimen:  Blood from Arm, Right Updated:  02/19/18 1646                 CT head without contrast   Final Result by Darryl Sanchez MD (02/19 1813)      No acute intracranial abnormality  Microangiopathic changes  Workstation performed: [id number]         X-ray chest 2 views    (Results Pending)         Procedures  Procedures      Phone Consults  ED Phone Contact    ED Course  ED Course as of Feb 20 0008   Mon Feb 19, 2018   1746 WBC: (!!) 97 80   1747 Platelets: (!!) 26                               MDM  CritCare Time    Disposition  Final diagnoses:   Leukocytosis   Anemia   Thrombocytopenia ([facility] [address] )     Time reflects when diagnosis was documented in both MDM as applicable and the Disposition within this note     Time User Action Codes Description Comment    2/19/2018  6:40 PM Octaviano Andrade U  8  [D72 829] Leukocytosis     2/19/2018  6:40 PM Loman Rife [D64 9] Anemia     2/19/2018  6:40 PM Desire Andrade Add [D69 6] Thrombocytopenia ([facility] [address] )     2/19/2018 10:37 PM Aldair [place] Add [D72 821] Monocytosis     2/19/2018 10:37 PM Aldair [place] Modify [D72 821] Monocytosis     2/19/2018 10:38 PM Raegan Pizarro Add [R23 8] Generalized skin papules     2/19/2018 10:38 PM Aldair [place] Modify [R23 8] Generalized skin papules       ED Disposition     ED Disposition Condition Comment    Admit  Case was discussed with christen and the patient's admission status was agreed to be Admission Status: inpatient status to the service of Dr Kiara Rico   Follow-up Information    None       Patient's Medications   Discharge Prescriptions    No medications on file     No discharge procedures on file      ED Provider  Attending physically available and evaluated Laurey Litten David Ruiz I managed the patient along with the ED Attending      Electronically Signed by         Hodan Walton MD  02/20/18 9007

## 2018-02-19 NOTE — ED ATTENDING ATTESTATION
Felisa Casey MD, saw and evaluated the patient  I have discussed the patient with the resident/non-physician practitioner and agree with the resident's/non-physician practitioner's findings, Plan of Care, and MDM as documented in the resident's/non-physician practitioner's note, except where noted  All available labs and Radiology studies were reviewed  At this point I agree with the current assessment done in the Emergency Department  I have conducted an independent evaluation of this patient a history and physical is as follows:  Patient with c/o cough with phlem, fever, generalized weakness, sent from doctor';s office; also rash at back for about 6 weeks  On exam, no acute distress, VSS, Lungs CTA, CVS RRR, Abd soft, NTND; Extrem no calf swelling or tenderness; back/skin: scattered papular skin rash with old scabs  Impression: Febrile illness, Flu, pneumonia, UTI, non-specific rash, possible ?underlying pathology  We will check labs including CBC, CMP, EKG, CXR  Patient under resident care, workup pending; case discussed in sign out with Dr Yemi Soriano; patient would need to be admitted      Critical Care Time  CritCare Time    Procedures

## 2018-02-19 NOTE — PROGRESS NOTES
Assessment/Plan:    Fever  The patient has been running a fever and today in the office it is 100 3  Is not taken any medication for this  Lethargic  Patient is extremely lethargic and unable to follow simple instructions  He had trouble getting from a sitting position into the wheelchair to take him out to the car  Myalgia  Patient has been experiencing some achiness along with some upper respiratory symptoms  Balance problems  The patient has had balance problems in the past but everything is exacerbated now with his fever       Diagnoses and all orders for this visit:    Balance problems    Lethargic    Myalgia    Fever in other diseases          Subjective:      Patient ID: Dean Riedel is a 80 y o  male  CC :  Rash has returned on back  Pt very weak and off balance  Body aches  Loss of appetite  -  [facility]    HPI:  This is an 55-year-old male who comes in because of severe lethargy and a fever which started yesterday  He has been doing some coughing and has been blowing exudate from his nose  His wife says that the lethargy just began and that she has never seen him this lethargic before  He is not confused but he is unable to follow commands as far as sitting from a standing position  His blood pressure is 108/56 and his temperature is 100 3°  He is worried about a rash that began on his back but since he is so lethargic he is asking to go to the emergency room for further evaluation  The following portions of the patient's history were reviewed and updated as appropriate: allergies, current medications, past family history, past medical history, past social history, past surgical history and problem list     Review of Systems   Constitutional: Positive for fatigue and fever  HENT: Positive for congestion and rhinorrhea  Eyes: Negative  Respiratory: Positive for cough  Cardiovascular: Negative  Gastrointestinal: Negative  Endocrine: Negative      Genitourinary: Negative  Musculoskeletal: Positive for myalgias  Neurological: Positive for weakness  Vitals:    02/19/18 1459   BP: 108/56   BP Location: Left arm   Patient Position: Sitting   Cuff Size: Large   Pulse: 84   Temp: 100 3 °F (37 9 °C)   TempSrc: Oral   Weight: 76 2 kg (168 lb)   Height: 5' 10" (1 778 m)   Objective:      /56 (BP Location: Left arm, Patient Position: Sitting, Cuff Size: Large)   Pulse 84   Temp 100 3 °F (37 9 °C) (Oral)   Ht 5' 10" (1 778 m)   Wt 76 2 kg (168 lb)   BMI 24 11 kg/m²          Physical Exam   Constitutional: He is oriented to person, place, and time  He appears well-developed and well-nourished  HENT:   Head: Normocephalic  Right Ear: External ear normal    Left Ear: External ear normal    Mouth/Throat: Oropharynx is clear and moist    Eyes: Conjunctivae and EOM are normal  Pupils are equal, round, and reactive to light  Neck: Normal range of motion  Neck supple  Cardiovascular: Normal rate, regular rhythm and normal heart sounds  Pulmonary/Chest: Effort normal and breath sounds normal    Abdominal: Soft  Bowel sounds are normal    Musculoskeletal: Normal range of motion  Neurological: He is alert and oriented to person, place, and time  Skin: Skin is warm  Psychiatric: He has a normal mood and affect   His behavior is normal  Judgment and thought content normal

## 2018-02-19 NOTE — ASSESSMENT & PLAN NOTE
The patient has been running a fever and today in the office it is 100 3  Is not taken any medication for this

## 2018-02-19 NOTE — ASSESSMENT & PLAN NOTE
Patient is extremely lethargic and unable to follow simple instructions  He had trouble getting from a sitting position into the wheelchair to take him out to the car

## 2018-02-20 PROBLEM — J18.9 CAP (COMMUNITY ACQUIRED PNEUMONIA): Status: ACTIVE | Noted: 2018-01-01

## 2018-02-20 NOTE — ED NOTES
Spoke with patient wife at this time with permission from pt   Pt wife updated on continued plan of care      Phuong Alvarado RN  02/20/18 4168

## 2018-02-20 NOTE — ASSESSMENT & PLAN NOTE
By tachycardia fever, leukocytosis, LA unremarkable  Unclear if pt w/true infectious process fever may be 2* hyperleukocytosis  CXR appears negative for pna although interpretation pending UA unremarkable  Will provide cefepime 1g IV and azithromax for now, although   F/u blood cx from ED

## 2018-02-20 NOTE — CASE MANAGEMENT
Initial Clinical Review    Admission: Date/Time/Statement: 2/19/18 @ 1843     Orders Placed This Encounter   Procedures    Inpatient Admission (expected length of stay for this patient is greater than two midnights)     Standing Status:   Standing     Number of Occurrences:   1     Order Specific Question:   Admitting Physician     Answer:   Shantel King [[id number]]     Order Specific Question:   Level of Care     Answer:   Med Surg [16]     Order Specific Question:   Estimated length of stay     Answer:   More than 2 Midnights     Order Specific Question:   Certification     Answer:   I certify that inpatient services are medically necessary for this patient for a duration of greater than two midnights  See H&P and MD Progress Notes for additional information about the patient's course of treatment  ED: Date/Time/Mode of Arrival:   ED Arrival Information     Expected Arrival Acuity Means of Arrival Escorted By Service Admission Type    2/19/2018 15:25 2/19/2018 16:16 Emergent Walk-In Self General Medicine Emergency    Arrival Complaint    Fever/Lethargic/ Achey--R/O Pneumonia        Chief Complaint:   Chief Complaint   Patient presents with    Fever - 75 years or older     Pt reports generalized weakness and fever for 1 wekk  Pt states he also has a rash on his back   Per pt he had a sinus infection a week ago  Ptdeneis nausea/sob/cp       History of Illness: 59-year-old male that presents today with fever and lethargy  Patient states he does not have any medical problems and for the past week he has been feeling very tired and fatigued  States he has had a fever for the past week for which she has not been taking anything  Wife states he has been very lethargic barely eating or drinking anything  Patient denies any chest pain or shortness of breath  He does states he has been having a cough with greenish sputum production  States no abdominal pain urinary complaints numbness or tingling    Denies any headaches or neck pain  Patient does state he ever he has a rash on his back which she had about 6 weeks ago evaluated by PCP was told it was dermatitis he states he took some steroids which improved  He states he came back again and itchy  Denies any drainage  Today patient followed up with PCP advise him to come to the ER because patient appeared very lethargic and weak and had a fever  Will do a septic workup  May patient the hospital IV fluids    ED Vital Signs:   ED Triage Vitals   Temperature Pulse Respirations Blood Pressure SpO2   02/19/18 1622 02/19/18 1623 02/19/18 1623 02/19/18 1623 02/19/18 1623   (!) 101 °F (38 3 °C) 100 18 135/64 96 %      Temp Source Heart Rate Source Patient Position - Orthostatic VS BP Location FiO2 (%)   02/19/18 1622 02/19/18 1623 02/19/18 1623 02/19/18 1623 --   Temporal Monitor Sitting Right arm       Pain Score       02/19/18 1846       No Pain        Wt Readings from Last 1 Encounters:   02/19/18 76 2 kg (168 lb)       Vital Signs (abnormal):   02/19/18 2018 99 4 °F (37 4 °C) 74 -- 124/69 95 % -- JG   02/19/18 1845 100 °F (37 8 °C) 85 18 120/65 95 %         Abnormal Labs/Diagnostic Test Results:  Na 134,   Cr 1 33,   Ca 8 2,   Alb 3 2,   WBC's 97 80,   H&H 7 4 / 21 3,   Plats 26,   Monos 70,   Blasts 14,   ANC 0 00  Urine:   2+ protein,   Mod blood  BC's  Pending    CT Head: No acute intracranial abnormality   Microangiopathic changes  CXR:  Right upper lobe infiltrate   Consider infectious process    EKG: Normal sinus rhythm  RSR' or QR pattern in V1 suggests right ventricular conduction delay    ED Treatment:   Medication Administration from 02/19/2018 1525 to 02/20/2018 1442       Date/Time Order Dose Route Action Action by Comments     02/19/2018 1646 acetaminophen (TYLENOL) tablet 650 mg 650 mg Oral Given Ted Hernandez RN      02/19/2018 1844 sodium chloride 0 9 % bolus 1,000 mL 0 mL Intravenous Stopped Ted Hernandez RN      02/19/2018 1709 sodium chloride 0 9 % bolus 1,000 mL 1,000 mL Intravenous [address] Albaro Azul RN      02/20/2018 1210 cefepime (MAXIPIME) IVPB (premix) 1,000 mg 0 mg Intravenous Stopped Grzegorz Schwartz RN      02/20/2018 1048 cefepime (MAXIPIME) IVPB (premix) 1,000 mg 1,000 mg Intravenous [address] Grzegorz Schwartz RN      02/20/2018 0041 cefepime (MAXIPIME) IVPB (premix) 1,000 mg 0 mg Intravenous Stopped Azam Haro RN      02/19/2018 2255 cefepime (MAXIPIME) IVPB (premix) 1,000 mg 1,000 mg Intravenous [address] Albaro Azul RN      02/20/2018 0216 azithromycin (ZITHROMAX) 500 mg in sodium chloride 0 9% 250mL IVPB 500 mg 0 mg Intravenous Stopped Azam Haro RN      02/20/2018 0043 azithromycin (ZITHROMAX) 500 mg in sodium chloride 0 9% 250mL IVPB 500 mg 500 mg Intravenous [address] Azam Haro RN      02/20/2018 1436 sodium chloride 0 9 % infusion 0 mL/hr Intravenous Stopped Grzegorz Schwartz RN      02/20/2018 1247 sodium chloride 0 9 % infusion 75 mL/hr Intravenous New Bag Grzegorz Schwartz RN      02/20/2018 1246 sodium chloride 0 9 % infusion 0 mL/hr Intravenous Stopped Grzegorz Schwartz RN      02/19/2018 2255 sodium chloride 0 9 % infusion 75 mL/hr Intravenous [address] Albaro Azul RN      02/20/2018 1411 cefTRIAXone (ROCEPHIN) IVPB (premix) 1,000 mg 1,000 mg Intravenous New Bag Kuldip Peoples RN           Past Medical/Surgical History:    Active Ambulatory Problems     Diagnosis Date Noted    Allergic rhinitis 01/20/2018    Atopic dermatitis 12/08/2014    Balance problems 11/02/2017    Enlarged prostate without lower urinary tract symptoms (luts) 05/17/2012    Erectile dysfunction 82/89/8548    Folliculitis 67/29/3991    Hematuria, gross 04/01/2016    Hyperglycemia 06/06/2013    Melanoma in situ ([facility] [address] ) 06/06/2013    Peripheral arterial disease ([facility] [address] ) 08/25/2015    Peripheral neuropathy 08/19/2013    Peripheral vascular disease ([facility] ) 08/25/2015    Lethargic 02/19/2018    Myalgia 02/19/2018    Fever 02/19/2018     Resolved Ambulatory Problems     Diagnosis Date Noted    No Resolved Ambulatory Problems     No Additional Past Medical History       Admitting Diagnosis: Leukocytosis [D72 829]  Monocytosis [D72 821]  Anemia [D64 9]  Thrombocytopenia (HCC) [D69 6]  Fever [R50 9]  Generalized skin papules [R23 8]    Age/Sex: 80 y o  male    Assessment/Plan:   Principal Problem:    Monocytosis  Active Problems:    Generalized skin papules    SIRS (systemic inflammatory response syndrome) (HCC)    Lethargy    Anemia    Thrombocytopenia (HCC)    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(HCC)    MARIA VICTORIA (acute kidney injury) ([facility] )        * Monocytosis   Assessment & Plan     Severe, new from 2y prior  Concerning for AML w/suppression of other cell lines and given degree of leukocytosis borderline high risk for spontaeous TLS   flow cytometry   No clinical s/sx of leukostasis  Repeat CBC in AM w/diff, check electrolytes  Discussed w/dr Christina Ferrara formal consult w/ hematology/oncology pending          MARIA VICTORIA (acute kidney injury) ([place] [address] )   Assessment & Plan     Pt w/mild dehydration consider 2* poor oral intake, monitor closely given degree of leukocytosis and likely AML  Suspect bl approx 1 0 although certainly given advanced age pt may simply have decrease in renal function over this time w/o true injury  Check FeNa  Start 75cc/hr NS IVF  Repeat bmp in am          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[facility])   Assessment & Plan     Chronic w/in basal ganglia upon CT head today  No focal neurodeficits  Start asa 81mg daily  Check lipid panel, hgb a1c for completeness  Will need op f/u with neuro          Thrombocytopenia ([facility] )   Assessment & Plan     D/w hematologist  No active s/sx of bleed  Transfuse for platelets <79  Check PT/INR and aPTT          Anemia   Assessment & Plan     Normocytic, 7 4  No active signs of bleeding  Type and screen obtained  consent for blood obtained although will need formal discussion by attending for hgb < 7 0  Repeat CBC in a m , check PT/INR and APTT             Lethargy   Assessment & Plan     Likely multifactorial from anemia and monocytosis  Consider secondary to acute infectious process given fever although this may be due to hyperleukocytosis  CXR appears negative for PNA, CT head negative for any hemorrhage, midline shift or ischemia          SIRS (systemic inflammatory response syndrome) (HCC)   Assessment & Plan     By tachycardia fever, leukocytosis, LA unremarkable  Unclear if pt w/true infectious process fever may be 2* hyperleukocytosis  CXR appears negative for pna although interpretation pending UA unremarkable  Will provide cefepime 1g IV and azithromax for now, although   F/u blood cx from ED          Generalized skin papules   Assessment & Plan     Consider leukemia cutis  Localized primarily on back, buttocks, abdomen and extensors of lower legs  Are minimally pruritic  Start atarax, ammonium lactate, consult derm/onc             VTE Prophylaxis: Pharmacologic VTE Prophylaxis contraindicated due to Thrombocytopenia  / reason for no mechanical VTE prophylaxis Thrombocytopenia   Code Status:  Full code  POLST: There is no POLST form on file for this patient (pre-hospital)     Anticipated Length of Stay:  Patient will be admitted on an Inpatient basis with an anticipated length of stay of  greater than 2 midnights     Justification for Hospital Stay:  Lethargy, anemia, hyper leukocytosis    Admission Orders:  IP  Consult Hematology  Consult Dermatology  Urine Na  & Creat  Droplet Isolation  Urine Legionella and Strep Pneumoniae  Sputum Cx  Consult IR  Transfuse 1 unit RBC's  H&H after transfusion      Scheduled Meds:   Cefepime IV q 12h = Dc'd 2/20  Current Facility-Administered Medications:  acetaminophen 650 mg Oral Q6H PRN Daysi Pizarro PA-C    azithromycin 500 mg Intravenous Q24H Daysi Pizarro PA-C Last Rate: Stopped (02/20/18 0216)   benzonatate 100 mg Oral TID PRN Daysi Pizarro PA-C    cefTRIAXone 1,000 mg Intravenous Q24H Daysi Pizarro PA-C Last Rate: 1,000 mg (02/20/18 1411)   guaiFENesin 600 mg Oral Q12H [address] Daysi Pizarro PA-C    hydrOXYzine HCL 25 mg Oral Q6H PRN Daysi Pizarro PA-C      Continuous Infusions:  IVF at 75 / hr  PRN Meds:   acetaminophen    benzonatate    hydrOXYzine HCL  Attestation signed by Meng Marquez MD at 2/20/2018 4:00 PM   I have personally seen and examined this patient, reviewed labs and imaging personally and discussed with [facility]  In addition I also discussed with Oncology TANVI [id number] male presents with fever cough and lethargy  He was started on amoxicillin 4 weeks ago for URI and sinusitis  In the ED was noted to have a hemoglobin of 7 4, platelets of 31328 and a white blood cell [place] 50810 with 70% monocytes 14% blasts and 8 percent a typical lymphocytes  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-treatment options were discussed with the patient and family-they are aware that treat prognosis is poor with a without treatment  Bone marrow aspiration and biopsy has been ordered through IR  Oncology recommends [facility] to [facility] for [facility]  Patient will receive 1 unit of PRBC  I did talk to the slim attending on-call Dr Caridad Sandifer who kindly accepted the patient for transfer  Thank you,  [address] [place] [address] [facility] Praneeth Shah for 2017  Network Utilization Review Department  Phone: [phone]; Fax [phone]  ATTENTION: The Network Utilization Review Department is now centralized for our 7 Facilities  Make a note that we have a new phone and fax numbers for our Department  Please call with any questions or concerns to [phone] and carefully follow the prompts so that you are directed to the right person   All voicemails are confidential  Fax any determinations, approvals, denials, and requests for initial or continue stay review clinical to [phone]  Due to HIGH CALL volume, it would be easier if you could please send faxed requests to expedite your requests and in part, help us provide discharge notifications faster

## 2018-02-20 NOTE — ASSESSMENT & PLAN NOTE
Severe, new from 2y prior  Concerning for AML w/suppression of other cell lines and given degree of leukocytosis borderline high risk for spontaeous TLS   flow cytometry   No clinical s/sx of leukostasis  Repeat CBC in AM w/diff, check electrolytes  Discussed w/dr Jessica Flores formal consult w/ hematology/oncology pending

## 2018-02-20 NOTE — ASSESSMENT & PLAN NOTE
Consider leukemia cutis  Localized primarily on back, buttocks, abdomen and extensors of lower legs  Are minimally pruritic  Start atarax, ammonium lactate, consult derm/onc

## 2018-02-20 NOTE — ASSESSMENT & PLAN NOTE
Normocytic, 7 4 now 6 9  No active signs of bleeding  Type and screen obtained  consent for blood obtained although will need formal discussion by attending for hgb < 7 0  Will transfuse 1 unit PRBC leukoreduced per hem/onc, check iron panel

## 2018-02-20 NOTE — CONSULTS
Oncology Consult Note  Bella Orozco 80 y o  male MRN: [id number]  Unit/Bed#: ED 29 Encounter: [id number]          Assessment and Plan:   1  Leukocytosis with peripheral blasts  2    Anemia  Hemoglobin 7 g/dL  3  Thrombocytopenia  Platelet count 58,219    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  Reviewed CBC results and suspected diagnosis with the patient  His wife, Doretha Madrigal and daughter in law, Doroteo Collins were present later this afternoon  Reviewed that prognosis is poor with or without treatment  Due to his age, he is not a candidate for high-dose induction chemotherapy  Treatment options which would very unlikely cure his disease includes treatment with Vidaza or Dacogen for non-M3 disease  Bone marrow aspiration and biopsy needed to evaluate  Discussed option of no treatment  Discussed that with treatment the primary side effect is low blood counts and the patient that already has pancytopenia  Is anticipated that he will have blood counts checked multiple times a week and transfusions of packed red blood cells and platelets are anticipated should he elect to proceed with treatment  Discussed that cure is very unlikely  The more realistic goal is to improve his bone marrow and to reduce the frequency of transfusions  It may take 4-6 months until we would know whether treatment were effective  His risk of infection including sepsis is extremely high  We did discuss that should he experience any signs of infection which actually might not be as pronounced in a less immunocompromised patient, he is to notify our office or seek medical attention  We did review stringent neutropenic precautions including avoidance of fresh fruits and vegetables any potential sick contacts  He is also at risk of bleeding due to thrombocytopenia  Of most concern is potential intracranial bleed  We did discuss thrombocytopenic precautions      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 treatment could have potentially fatal consequences  He and his family wish to proceed with further investigation and probable treatment  Additional labs requested for DIC evaluation  Discussed that his rash may be leukemic infiltrate of the skin  Stat consult to IR for bone marrow biopsy placed  Reviewed with patient and his family that due to acuity of his illness that transfer to [facility] recommended and they are in agreement  Discussed with Dr Shiv Moreira   Will be arranged for 2/21/2018  Blood products to be leukoreduced  Addendum:  Patient's family contact information is as follows: Wife Lenin Sons home [phone]  Daughter-in-law Samm Grimm cell [phone];  Son Fozia Limon cell # [phone]  Zain Restrepo  Cell # [phone]    Per patient, may speak to all patient's above  Reason for Consultation:    Abnormal CBCD    History of Presenting Illness:    Riccardo Douglass is a 80 y o  male who presented to [facility]/19/2018 regarding  Progressive fatigue over past 6 weeks  He also had fever and  nonproductive cough  He was started on amoxicillin 4 weeks ago for diagnosis of sinusitis  He also had a diffuse rash on his back  He did receive Medrol Dosepak without much improvement  He has had decreased appetite and lost 13 pounds over the prior month without trying  Denies lightheadedness  Denies any easy bruising or bleeding  He denies any chest pain, shortness of breath, chills  No dizziness or light headedness  He overall is in good health  He is very active  He does have a rash on his back which he states "looks worse than what it feels like "    2/19/2018:   hemoglobin 7 4, MCV of 93, platelet count 88345, white blood cell count 06376  With 0 neutrophils identified, 8% lymphocytes, 70% monocytes, 14% blasts 8% atypical lymphocytes  PTT of 41, protime 17 1, INR 1 39        Review of Systems - As stated in the HPI otherwise the fourteen point review of systems was negative  History reviewed  No pertinent past medical history  Past Surgical History:   Procedure Laterality Date    COLONOSCOPY N/A 2012    COLONOSCOPY  01/2013    No repeat is recommended  Findings were diverticulosis and mild hemorrhoids   CYSTOSCOPY      onset: 05/06/2016; diagnostic    HERNIA REPAIR  12/09/2003    IA ENDOVENOUS LASER, 1ST VEIN Left 1/15/2016    Procedure: ENDOVASCULAR LASER THERAPY (EVLT) with multiple stab phlebectomies-between 10-20;   Surgeon: Madhav Fairbanks MD;  Location: [facility];  Service: Vascular    IA PHLEB VEINS - EXTREM - TO 20 Left 1/15/2016    Procedure: LEG ENDOVENOUS LASER OBLITERATION GREATER SAPHENOUS VEIN WITH MULTIPLE VEIN LIGATIONS ;  Surgeon: Madhav Fairbanks MD;  [facility];  Service: Vascular    TONSILLECTOMY N/A        Social History     Social History    Marital status: /Civil Union     Spouse name: N/A    Number of children: N/A    Years of education: N/A     Occupational History    Retired      Social History Main Topics    Smoking status: Never Smoker    Smokeless tobacco: Never Used    Alcohol use Yes      Comment: 4 drinks per month    Drug use: Unknown    Sexual activity: Not Asked     Other Topics Concern    None     Social History Narrative    No secondhand smoke exposure       Family History   Problem Relation Age of Onset    Coronary artery disease Mother     Stroke Mother     Coronary artery disease Father     Heart disease Father        Allergies   Allergen Reactions    Iodinated Diagnostic Agents      IVP dye         Current Facility-Administered Medications:     acetaminophen (TYLENOL) tablet 650 mg, 650 mg, Oral, Q6H PRN, Daysi Pizarro PA-C    azithromycin (ZITHROMAX) 500 mg in sodium chloride 0 9% 250mL IVPB 500 mg, 500 mg, Intravenous, Q24H, Daysi Pizarro PA-C, Stopped at 02/20/18 0216    benzonatate (TESSALON PERLES) capsule 100 mg, 100 mg, Oral, TID PRN, Daysi Pizarro PA-C    cefepime (MAXIPIME) IVPB (premix) 1,000 mg, 1,000 mg, Intravenous, Q12H, Daysi Pizarro PA-C, Stopped at 02/20/18 0041    hydrOXYzine HCL (ATARAX) tablet 25 mg, 25 mg, Oral, Q6H PRN, Daysi Pizarro PA-C    sodium chloride 0 9 % infusion, 75 mL/hr, Intravenous, Continuous, Daysi Pizarro PA-C, Last Rate: 75 mL/hr at 02/19/18 2255, 75 mL/hr at 02/19/18 2255    Current Outpatient Prescriptions:     Ascorbic Acid (VITAMIN C) 100 MG tablet, Take 100 mg by mouth daily  , Disp: , Rfl:     Multiple Vitamins-Minerals (CENTRUM SILVER 50+MEN PO), Take 1 tablet by mouth daily, Disp: , Rfl:     Omega-3 Fatty Acids (FISH OIL) 1,000 mg, Take 1 capsule by mouth, Disp: , Rfl:       (Not in a hospital admission)      PHYSICAL EXAMINATION     /60 (BP Location: Right arm)   Pulse 92   Temp 99 4 °F (37 4 °C) (Oral)   Resp 18   SpO2 98%         Physical Exam:      Constitutional:  Oriented to person, place, and time  Appears well-developed and well-nourished  NAD  Non-ill appearring  Pale  Head: Normocephalic and atraumatic  Eyes: Conjunctivae, EOM and lids are normal  Pupils are equal, round  Cardiovascular: Regular rate and rhythm without rubs, murmurs or gallops  Extremities:  No LE edema  2+ dorsalis pedis pulses  Pulmonary/Chest: CTA without wheezing, rales or rhonchi  Abdomen:  NABS, Soft, non-tender without rebound or guarding  Liver and spleen non-palpable  Musculoskeletal: Normal range of motion  Minimal arthritic changes  Neurological: Grossly normal strength without sensory deficit  Skin: Skin is warm, dry  Diffuse scabbed appearance across his back  Psychiatric:  Normal mood and affect, speech  Cognition and memory are normal      Lymphatics:  No palpable peripheral adenopathy          RESULTS    Recent Results (from the past 48 hour(s))   CBC and differential    Collection Time: 02/19/18  4:42 PM   Result Value Ref Range    WBC 97 80 (HH) 4 31 - 10 16 Thousand/uL    RBC 2 30 (L) 3 88 - 5 62 Million/uL    Hemoglobin 7 4 (L) 12 0 - 17 0 g/dL    Hematocrit 21 3 (L) 36 5 - 49 3 %    MCV 93 82 - 98 fL    MCH 32 2 26 8 - 34 3 pg    MCHC 34 7 31 4 - 37 4 g/dL    RDW 14 1 11 6 - 15 1 %    MPV 10 3 8 9 - 12 7 fL    Platelets 26 (LL) 713 - 390 Thousands/uL    nRBC 0 /100 WBCs   Comprehensive metabolic panel    Collection Time: 02/19/18  4:42 PM   Result Value Ref Range    Sodium 134 (L) 136 - 145 mmol/L    Potassium 4 1 3 5 - 5 3 mmol/L    Chloride 100 100 - 108 mmol/L    CO2 22 21 - 32 mmol/L    Anion Gap 12 4 - 13 mmol/L    BUN 18 5 - 25 mg/dL    Creatinine 1 33 (H) 0 60 - 1 30 mg/dL    Glucose 125 65 - 140 mg/dL    Calcium 8 2 (L) 8 3 - 10 1 mg/dL    AST 40 5 - 45 U/L    ALT 25 12 - 78 U/L    Alkaline Phosphatase 79 46 - 116 U/L    Total Protein 7 7 6 4 - 8 2 g/dL    Albumin 3 2 (L) 3 5 - 5 0 g/dL    Total Bilirubin 0 75 0 20 - 1 00 mg/dL    eGFR 48 ml/min/1 73sq m   Lactic acid x2    Collection Time: 02/19/18  4:42 PM   Result Value Ref Range    LACTIC ACID 1 3 0 5 - 2 0 mmol/L   Manual Differential(PHLEBS Do Not Order)    Collection Time: 02/19/18  4:42 PM   Result Value Ref Range    Segmented % 0 (L) 43 - 75 %    Lymphocytes % 8 (L) 14 - 44 %    Monocytes % 70 (H) 4 - 12 %    Eosinophils % 0 0 - 6 %    Basophils % 0 0 - 1 %    Blasts % 14 (H) <=0 %    Atypical Lymphocytes % 8 (H) <=0 %    Absolute Neutrophils 0 00 (L) 1 85 - 7 62 Thousand/uL    Lymphocytes Absolute 7 82 (H) 0 60 - 4 47 Thousand/uL    Monocytes Absolute 68 46 (H) 0 00 - 1 22 Thousand/uL    Eosinophils Absolute 0 00 0 00 - 0 40 Thousand/uL    Basophils Absolute 0 00 0 00 - 0 10 Thousand/uL    Total Counted 100     Anisocytosis Present     Platelet Estimate Decreased (A) Adequate   Influenza A/B and RSV by PCR (indicated for patients >2 mo of age)    Collection Time: 02/19/18  5:08 PM   Result Value Ref Range    INFLU A PCR None Detected None Detected    INFLU B PCR None Detected None Detected    RSV PCR None Detected None Detected   ECG 12 lead    Collection Time: 02/19/18  5:13 PM   Result Value Ref Range    Ventricular Rate 90 BPM    Atrial Rate 90 BPM    WI Interval 162 ms    QRSD Interval 88 ms    QT Interval 362 ms    QTC Interval 442 ms    P Axis 55 degrees    QRS Axis -10 degrees    T Wave Axis 47 degrees   Path Slide Review    Collection Time: 02/19/18  6:01 PM   Result Value Ref Range    Path Review       Leukocytosis with circulating blasts, monocytosis including atypical/immature forms  Thrombocytopenia  Recommend hematology consult and flow cytometry for immunophenotyping to exclude acute leukemia    Dr Marcela Rivera notified, 02/20/18 at 9:30 am   Dr Roscoe Jimenez, 02/20/18   ED Urine Macroscopic    Collection Time: 02/19/18  6:46 PM   Result Value Ref Range    Color, UA Yellow     Clarity, UA Clear     pH, UA 7 0 4 5 - 8 0    Leukocytes, UA Negative Negative    Nitrite, UA Negative Negative    Protein,  (2+) (A) Negative mg/dl    Glucose, UA Negative Negative mg/dl    Ketones, UA Negative Negative mg/dl    Urobilinogen, UA 0 2 0 2, 1 0 E U /dl E U /dl    Bilirubin, UA Negative Negative    Blood, UA Moderate (A) Negative    Specific Gravity, UA 1 010 1 003 - 1 030   Urine Microscopic    Collection Time: 02/19/18  6:46 PM   Result Value Ref Range    RBC, UA 10-20 (A) None Seen, 0-5 /hpf    WBC, UA 2-4 (A) None Seen, 0-5, 5-55, 5-65 /hpf    Epithelial Cells Occasional None Seen, Occasional /hpf    Bacteria, UA Occasional None Seen, Occasional /hpf    Fine granular casts 0-1 /lpf   Type and screen    Collection Time: 02/19/18  6:47 PM   Result Value Ref Range    ABO Grouping O     Rh Factor Positive     Antibody Screen Negative     Specimen Expiration Date [id number]    Protime-INR    Collection Time: 02/19/18 11:10 PM   Result Value Ref Range    Protime 16 9 (H) 12 1 - 14 4 seconds    INR 1 37 (H) 0 86 - 1 16   APTT    Collection Time: 02/19/18 11:10 PM   Result Value Ref Range    PTT 46 (H) 23 - 35 seconds Comprehensive metabolic panel    Collection Time: 02/20/18  6:06 AM   Result Value Ref Range    Sodium 137 136 - 145 mmol/L    Potassium 3 8 3 5 - 5 3 mmol/L    Chloride 106 100 - 108 mmol/L    CO2 20 (L) 21 - 32 mmol/L    Anion Gap 11 4 - 13 mmol/L    BUN 15 5 - 25 mg/dL    Creatinine 1 31 (H) 0 60 - 1 30 mg/dL    Glucose 116 65 - 140 mg/dL    Calcium 7 3 (L) 8 3 - 10 1 mg/dL    AST 37 5 - 45 U/L    ALT 24 12 - 78 U/L    Alkaline Phosphatase 66 46 - 116 U/L    Total Protein 6 3 (L) 6 4 - 8 2 g/dL    Albumin 2 5 (L) 3 5 - 5 0 g/dL    Total Bilirubin 0 66 0 20 - 1 00 mg/dL    eGFR 49 ml/min/1 73sq m   Magnesium    Collection Time: 02/20/18  6:06 AM   Result Value Ref Range    Magnesium 2 0 1 6 - 2 6 mg/dL   Phosphorus    Collection Time: 02/20/18  6:06 AM   Result Value Ref Range    Phosphorus 2 7 2 3 - 4 1 mg/dL   CBC and differential    Collection Time: 02/20/18  6:06 AM   Result Value Ref Range    WBC 79 46 (HH) 4 31 - 10 16 Thousand/uL    RBC 2 12 (L) 3 88 - 5 62 Million/uL    Hemoglobin 6 9 (LL) 12 0 - 17 0 g/dL    Hematocrit 19 7 (L) 36 5 - 49 3 %    MCV 93 82 - 98 fL    MCH 32 5 26 8 - 34 3 pg    MCHC 35 0 31 4 - 37 4 g/dL    RDW 14 0 11 6 - 15 1 %    MPV 10 3 8 9 - 12 7 fL    Platelets 23 (LL) 407 - 390 Thousands/uL    nRBC 0 /100 WBCs   Manual Differential(PHLEBS Do Not Order)    Collection Time: 02/20/18  6:06 AM   Result Value Ref Range    Segmented % 0 (L) 43 - 75 %    Bands % 1 0 - 8 %    Lymphocytes % 11 (L) 14 - 44 %    Monocytes % 73 (H) 4 - 12 %    Eosinophils % 0 0 - 6 %    Basophils % 0 0 - 1 %    Blasts % 9 (H) <=0 %    Atypical Lymphocytes % 6 (H) <=0 %    Absolute Neutrophils 0 79 (L) 1 85 - 7 62 Thousand/uL    Lymphocytes Absolute 8 74 (H) 0 60 - 4 47 Thousand/uL    Monocytes Absolute 58 01 (H) 0 00 - 1 22 Thousand/uL    Eosinophils Absolute 0 00 0 00 - 0 40 Thousand/uL    Basophils Absolute 0 00 0 00 - 0 10 Thousand/uL    Total Counted 100     Anisocytosis Present     Platelet Estimate Decreased (A) Adequate   Protime-INR    Collection Time: 02/20/18  6:07 AM   Result Value Ref Range    Protime 17 1 (H) 12 1 - 14 4 seconds    INR 1 39 (H) 0 86 - 1 16   APTT    Collection Time: 02/20/18  6:07 AM   Result Value Ref Range    PTT 41 (H) 23 - 35 seconds         Xr Sinuses Routine 3+ Views    Result Date: 2/1/2018  Narrative: PARANASAL SINUSES INDICATION:  Congestion  COMPARISON:  None VIEWS:  5 IMAGES:  5 FINDINGS: No evidence of air-fluid levels  There is suggestion of mucosal thickening in the left maxillary sinus  No lytic or blastic lesions are identified  Impression: Suggestion of left maxillary sinus mucosal thickening  No air-fluid levels and suggests acute sinusitis  Workstation performed: [id number]     X-ray Chest 2 Views    Result Date: 2/20/2018  Narrative: CHEST INDICATION:  Cough  Fever  COMPARISON:  April 27, 2014 VIEWS:  Frontal and lateral projections IMAGES:  3 FINDINGS:  Lungs adequately aerated  No consolidation or effusion  Perihilar infiltrate in the right upper lobe  Cardiac size within normal limits  No vascular congestion or peribronchial thickening  No pneumothorax or free air  Visualized osseous structures appear within normal limits for the patient's age  Impression: Right upper lobe infiltrate  Consider infectious process  Workstation performed: [id number]     Ct Head Without Contrast    Result Date: 2/19/2018  Narrative: CT BRAIN - WITHOUT CONTRAST INDICATION:  "generalized weakness, sent from doctor';s office" COMPARISON:  None  TECHNIQUE:  CT examination of the brain was performed  In addition to axial images, coronal reformatted images were created and submitted for interpretation  Radiation dose length product (DLP) for this visit:  1031 mGy-cm     This examination, like all CT scans performed in the [facility], was performed utilizing techniques to minimize radiation dose exposure, including the use of iterative reconstruction and automated exposure control  IMAGE QUALITY:  Diagnostic  FINDINGS:  PARENCHYMA:  Decreased attenuation is noted in the supratentorial white matter demonstrating an appearance most consistent with moderate microangiopathic change  Chronic-appearing bilateral basal ganglia lacunar infarcts  No intracranial mass, mass effect or midline shift  No CT signs of acute infarction  There is no parenchymal hemorrhage  VENTRICLES AND EXTRA-AXIAL SPACES:  Normal for patient's age  VISUALIZED ORBITS AND PARANASAL SINUSES:  Mild to moderate diffuse mucosal disease  CALVARIUM AND EXTRACRANIAL SOFT TISSUES:   Normal      Impression: No acute intracranial abnormality  Microangiopathic changes   Workstation performed: [id number]

## 2018-02-20 NOTE — ASSESSMENT & PLAN NOTE
Likely multifactorial from anemia and monocytosis  Consider secondary to acute infectious process given fever although this may be due to hyperleukocytosis  CXR appears negative for PNA, CT head negative for any hemorrhage, midline shift or ischemia

## 2018-02-20 NOTE — ASSESSMENT & PLAN NOTE
Severe, new from 2y prior  Concerning for AML w/suppression of other cell lines  F/u flow cytometry, pt for IR bone marrow bx per hem/onc  Continue to monitor electrolytes, although hyperleukocytosis is reduced monitor for s/sx of TLS

## 2018-02-20 NOTE — DISCHARGE SUMMARY
Discharge Summary - Medical Asia Atkinson 80 y o  male MRN: [id number]    [facility] Bed[facility] [address]/[facility] 2 M* Encounter: [id number]    BRIEF OVERVIEW      Admission Date: 2/19/2018       Transfer to [facility] on 02/20/2018    Admitting Diagnosis:  Suspected AML    Primary Discharge Diagnosis  Principal Problem:   Suspected AML  Active Problems:    Generalized skin papules-likely leukemic    SIRS (systemic inflammatory response syndrome) (HCC)    Lethargy    Anemia    Thrombocytopenia (HCC)    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([facility] [address] )    MARIA VICTORIA (acute kidney injury) ([facility] [address] )    CAP (community acquired pneumonia)      Service:  [facility]    Consulting Providers   Oncology doctor Tg Pearson    Procedures Performed   Bone marrow biopsy has been ordered stat  Unclear whether this will be done prior to transfer  [facility] be done at [facility] if not done prior to discharge from here        Assessment and plan on date of discharge    Leukocytosis-predominantly monocytosis with peripheral blast-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)   Assessment & Plan     Family and the patient want to proceed with chemotherapy  They are aware that his prognosis is poor with or without treatment and that chemotherapy would be palliative  Treatment options which would very unlikely cure his disease includes treatment with Vidaza or Dacogen for non-M3 disease  Bone marrow aspiration and biopsy needed to evaluate  As stat IR consult has been placed however it is unsure whether this will be done prior to his transfer to [facility]  If not done here it could be done at [facility] prior to his chemo  Oncology recommends transfer to [facility] for chemotherapy as well as close monitoring for development of tumor lysis syndrome or DIC            MARIA VICTORIA (acute kidney injury) (HCC)-mild   Assessment & Plan     Pt w/mild dehydration consider 2* poor oral intake,Start 75cc/hr NS IVF  Repeat bmp in am          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[facility])   Assessment & Plan     Chronic w/in basal ganglia upon CT head today  No focal neurodeficits          Thrombocytopenia (HCC)   Assessment & Plan     D/w hematologist  No active s/sx of bleed  Transfuse for platelets <37            Anemia   Assessment & Plan     Hemoglobin currently 6 9  Will transfuse 1 unit of PRBC prior to transfer  No active signs of bleeding  Type and screen obtained             right upper lobe infiltrate-suspected community-acquired pneumonia however this could also be leukemic infiltrates  Assessment & Plan     Patient on Zithromax and ceftriaxone  Hemodynamically stable  Legionella and strep pneumonia pending        SIRS (systemic inflammatory response syndrome) ([facility])   Assessment & Plan     F/u blood cx from ED          Generalized skin papules   Assessment & Plan     Consider leukemia cutis  Localized primarily on back, buttocks, abdomen and extensors of lower legs  minimally pruritic  Start atarax, ammonium lactate,             Discharge Condition:  Stable    Discharge Disposition:  Transferred to [facility]  Discharge summary:   80-year-old male presents with fever cough and lethargy  He was started on amoxicillin 4 weeks ago for URI and sinusitis  In the ED was noted to have a hemoglobin of 7 4, platelets of 00466 and a white blood cell count of 66186 with 70% monocytes 14% blasts and 8 percent a typical lymphocytes  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-treatment options were discussed with the patient and family-they are aware that treat prognosis is poor with a without treatment  Bone marrow aspiration and biopsy has been ordered through IR  Oncology recommends transferring to [facility] for palliative chemotherapy  Patient will receive 1 unit of PRBC prior to transfer    Patient also has a right upper lobe infiltrate although it is unclear this is community-acquired pneumonia versus leukemic infiltrates  Patient has been covered with ceftriaxone and Zithromax  He remains hemodynamically stable    He is a full code  I did talk to the [facility] attending on-call Dr Tracie Ferguson who kindly accepted the patient for transfer        Discharge Medications   Please see Medical Reconciliation Discharge Form      Discharge  Statement   Total Time Spent today including physical exam, discussion with patient and family, and discharge arrangements/care 41 minutes

## 2018-02-20 NOTE — PROGRESS NOTES
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 w/suppression of other cell lines  F/u flow cytometry, pt for IR bone marrow bx per hem/onc  Continue to monitor electrolytes, although hyperleukocytosis is reduced monitor for s/sx of TLS        CAP (community acquired pneumonia)   Assessment & Plan    By CXR for RUL infiltrate, pt met sepsis by fever of 101 and tachycardia yesterday although certainly his hyperleukocytosis could be the etiology of this fever  rec'd cefepime/azithromax on D1  Will switch to rocephin/azithromax, check legionella, strep pneumo ag, sputum cx  D/w attending as pt is on RA, w/no wob and minimal s/sx will defer CT chest for now        MARIA VICTORIA (acute kidney injury) ([facility] [address] )   Assessment & Plan    Pt w/mild dehydration consider 2* poor oral intake, monitor closely given degree of leukocytosis and likely AML  Suspect bl approx 1 0 although certainly given advanced age pt may simply have decrease in renal function over this time w/o true injury  Check FeNa  Start 75cc/hr NS IVF  Repeat bmp in am        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[facility])   Assessment & Plan    Chronic w/in basal ganglia upon CT head today  No focal neurodeficits  Start asa 81mg daily  Check lipid panel, hgb a1c for completeness  Will need op f/u with neuro        Thrombocytopenia ([facility] [address] )   Assessment & Plan    D/w hematologist  No active s/sx of bleed  Transfuse for platelets <41  Continue to monitor        Anemia   Assessment & Plan    Normocytic, 7 4 now 6 9  No active signs of bleeding  Type and screen obtained  consent for blood obtained although will need formal discussion by attending for hgb < 7 0  Will transfuse 1 unit PRBC leukoreduced per hem/onc, check iron panel        Lethargy Assessment & Plan    Likely multifactorial from anemia and monocytosis, RUL infiltrate concerning for CAP          Generalized skin papules   Assessment & Plan    Consider leukemia cutis  Localized primarily on back, buttocks, abdomen and extensors of lower legs  Are minimally pruritic  Start atarax, ammonium lactate, consult derm/onc          VTE Pharmacologic Prophylaxis:   Pharmacologic: Pharmacologic VTE Prophylaxis contraindicated due to coagulopathy  Mechanical VTE Prophylaxis in Place: Yes    Patient Centered Rounds: I have performed bedside rounds with nursing staff today  Discussions with Specialists or Other Care Team Provider: cm, hem/onc    Education and Discussions with Family / Patient: okay to call spouse lyly at home  Message left  Time Spent for Care: 20 minutes  More than 50% of total time spent on counseling and coordination of care as described above  Current Length of Stay: 1 day(s)    Current Patient Status: Inpatient   Certification Statement: The patient will continue to require additional inpatient hospital stay due to AML, cap    Discharge Plan:     Code Status: Level 1 - Full Code      Subjective:   No events overnight  The patient reports that he has cough is stable and it is still nonproductive  No sore throat, no muscle aches, no chest pain no lightheadedness or dizziness  No nausea vomiting fevers or chills by patient report no diarrhea on antibiotics  Rash is still slightly pruritic  Discussed w/pt about antipruritic agents  Objective:     Vitals:   Temp (24hrs), Av 9 °F (37 7 °C), Min:98 8 °F (37 1 °C), Max:101 °F (38 3 °C)    HR:  [] 90  Resp:  [16-18] 18  BP: ()/(53-69) 107/59  SpO2:  [95 %-98 %] 95 %  There is no height or weight on file to calculate BMI  Input and Output Summary (last 24 hours):        Intake/Output Summary (Last 24 hours) at 18 1357  Last data filed at 18 1246   Gross per 24 hour   Intake 2050 ml   Output                0 ml   Net             2050 ml       Physical Exam:     Physical Exam   HENT:   Head: Normocephalic and atraumatic  Right Ear: External ear normal    Left Ear: External ear normal    Mouth/Throat: No oropharyngeal exudate  Mucus membranes are dry   Eyes: Conjunctivae are normal  Right eye exhibits no discharge  Left eye exhibits no discharge  No scleral icterus  Neck: Normal range of motion  Cardiovascular: Normal rate, regular rhythm, normal heart sounds and intact distal pulses  Exam reveals no gallop and no friction rub  No murmur heard  Pulmonary/Chest: Breath sounds normal  No respiratory distress  He has no wheezes  He has no rales  He exhibits no tenderness  Abdominal: Soft  He exhibits no distension  There is no tenderness  There is no rebound and no guarding  Musculoskeletal: He exhibits no edema  Neurological: He is alert  Skin: Skin is warm and dry  Generalized papulo/nodular rash that is erythematous/violaceous   Psychiatric: He has a normal mood and affect  Vitals reviewed  (  Be Sure to Include Physical Exam: Delete this entire line when you have entered your exam)    Additional Data:     Labs:      Results from last 7 days  Lab Units 02/20/18  0606   WBC Thousand/uL 79 46*   HEMOGLOBIN g/dL 6 9*   HEMATOCRIT % 19 7*   PLATELETS Thousands/uL 23*   LYMPHO PCT % 11*   MONO PCT MAN % 73*   EOSINO PCT MANUAL % 0       Results from last 7 days  Lab Units 02/20/18  0606   SODIUM mmol/L 137   POTASSIUM mmol/L 3 8   CHLORIDE mmol/L 106   CO2 mmol/L 20*   BUN mg/dL 15   CREATININE mg/dL 1 31*   CALCIUM mg/dL 7 3*   TOTAL PROTEIN g/dL 6 3*   BILIRUBIN TOTAL mg/dL 0 66   ALK PHOS U/L 66   ALT U/L 24   AST U/L 37   GLUCOSE RANDOM mg/dL 116       Results from last 7 days  Lab Units 02/20/18  0607   INR  1 39*       * I Have Reviewed All Lab Data Listed Above  * Additional Pertinent Lab Tests Reviewed:  All Labs Within Last 24 Hours Reviewed    Imaging:    Imaging Reports Reviewed Today Include:   Imaging Personally Reviewed by Myself Includes:      Recent Cultures (last 7 days):       Results from last 7 days  Lab Units 02/19/18  1708   INFLUENZA A PCR  None Detected   INFLUENZA B PCR  None Detected   RSV PCR  None Detected       Last 24 Hours Medication List:     Current Facility-Administered Medications:  acetaminophen 650 mg Oral Q6H PRN Daysi Pizarro PA-C    azithromycin 500 mg Intravenous Q24H Daysi Pizarro PA-C Last Rate: Stopped (02/20/18 0216)   benzonatate 100 mg Oral TID PRN Daysi Pizarro PA-C    cefTRIAXone 1,000 mg Intravenous Q24H Daysi Pizarro PA-C    hydrOXYzine HCL 25 mg Oral Q6H PRN Daysi Keane PA-C         Today, Patient Was Seen By: Ashlee Mandel PA-C    ** Please Note: Dictation voice to text software may have been used in the creation of this document   **

## 2018-02-20 NOTE — ASSESSMENT & PLAN NOTE
Pt w/mild dehydration consider 2* poor oral intake, monitor closely given degree of leukocytosis and likely AML  Suspect bl approx 1 0 although certainly given advanced age pt may simply have decrease in renal function over this time w/o true injury  Check FeNa  Start 75cc/hr NS IVF  Repeat bmp in am

## 2018-02-20 NOTE — ASSESSMENT & PLAN NOTE
Chronic w/in basal ganglia upon CT head today  No focal neurodeficits  Start asa 81mg daily  Check lipid panel, hgb a1c for completeness  Will need op f/u with neuro

## 2018-02-20 NOTE — ASSESSMENT & PLAN NOTE
By CXR for RUL infiltrate, pt met sepsis by fever of 101 and tachycardia yesterday although certainly his hyperleukocytosis could be the etiology of this fever  rec'd cefepime/azithromax on D1  Will switch to rocephin/azithromax, check legionella, strep pneumo ag, sputum cx  D/w attending as pt is on RA, w/no wob and minimal s/sx will defer CT chest for now

## 2018-02-20 NOTE — ASSESSMENT & PLAN NOTE
Normocytic, 7 4  No active signs of bleeding  Type and screen obtained  consent for blood obtained although will need formal discussion by attending for hgb < 7 0  Repeat CBC in a m , check PT/INR and APTT

## 2018-02-20 NOTE — H&P
H&P- Riccardo Res 7/24/1931, 80 y o  male MRN: [id number]    Unit/Bed#: ED 09 Encounter: [id number]    Primary Care Provider: Nadeem Quinn MD   Date and time admitted to hospital: 2/19/2018  4:35 PM      History and Physical - [facility] Internal Medicine    Patient Information: Riccardo Douglass 80 y o  male MRN: [id number]  Unit/Bed#: ED 09 Encounter: [id number]  Admitting Physician: Ed Santiago PA-C  PCP: Nadeem Quinn MD  Date of Admission:  02/19/18    Assessment/Plan:    Hospital Problem List:     Principal Problem:    Monocytosis  Active Problems:    Generalized skin papules    SIRS (systemic inflammatory response syndrome) (HCC)    Lethargy    Anemia    Thrombocytopenia (HCC)    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([facility] [address] )    [facility] VICTOR[facility]        * Monocytosis   Assessment & Plan    Severe, new from 2y prior  Concerning for AML w/suppression of other cell lines and given degree of leukocytosis borderline high risk for spontaeous TLS   flow cytometry   No clinical s/sx of leukostasis  Repeat CBC in AM w/diff, check electrolytes  Discussed w/dr Tod Gowers formal consult w/ hematology/oncology pending        MARIA VICTORIA (acute kidney injury) ([facility] [address] )   Assessment & Plan    Pt w/mild dehydration consider 2* poor oral intake, monitor closely given degree of leukocytosis and likely AML  Suspect bl approx 1 0 although certainly given advanced age pt may simply have decrease in renal function over this time w/o true injury  Check FeNa  Start 75cc/hr NS IVF  Repeat bmp in am        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[facility])   Assessment & Plan    Chronic w/in basal ganglia upon CT head today  No focal neurodeficits  Start asa 81mg daily  Check lipid panel, hgb a1c for completeness  Will need op f/u with neuro        Thrombocytopenia ([facility] [address] )   Assessment & Plan    D/w hematologist  No active s/sx of bleed  Transfuse for platelets <45  Check PT/INR and aPTT        Anemia   Assessment & Plan    Normocytic, 7 4  No active signs of bleeding  Type and screen obtained  consent for blood obtained although will need formal discussion by attending for hgb < 7 0  Repeat CBC in a m , check PT/INR and APTT          Lethargy   Assessment & Plan    Likely multifactorial from anemia and monocytosis  Consider secondary to acute infectious process given fever although this may be due to hyperleukocytosis  CXR appears negative for PNA, CT head negative for any hemorrhage, midline shift or ischemia        SIRS (systemic inflammatory response syndrome) (HCC)   Assessment & Plan    By tachycardia fever, leukocytosis, LA unremarkable  Unclear if pt w/true infectious process fever may be 2* hyperleukocytosis  CXR appears negative for pna although interpretation pending UA unremarkable  Will provide cefepime 1g IV and azithromax for now, although   F/u blood cx from ED        Generalized skin papules   Assessment & Plan    Consider leukemia cutis  Localized primarily on back, buttocks, abdomen and extensors of lower legs  Are minimally pruritic  Start atarax, ammonium lactate, consult derm/onc          ·     VTE Prophylaxis: Pharmacologic VTE Prophylaxis contraindicated due to Thrombocytopenia  / reason for no mechanical VTE prophylaxis Thrombocytopenia   Code Status:  Full code  POLST: There is no POLST form on file for this patient (pre-hospital)    Anticipated Length of Stay:  Patient will be admitted on an Inpatient basis with an anticipated length of stay of  greater than 2 midnights  Justification for Hospital Stay:  Lethargy, anemia, hyper leukocytosis    Total Time for Visit, including Counseling / Coordination of Care: 1 hour  Greater than 50% of this total time spent on direct patient counseling and coordination of care  Chief Complaint:   Lethargy, cough, rash the last 6 weeks    History of Present Illness:    Kiki Delong is a 80 y o  male who presents with PMH of squamous cell carcinoma, melanoma coming the ED for lethargy fevers and cough    The patient is known to that very tight Ace  He was started on antibiotics proximally 4 weeks ago for a sinusitis which was amoxicillin by patient's report  He reports his congestion that time improved somewhat but he still had a residual cough which was loose nonproductive in occurred 4 times a day Julieneverardo Rebollar has been ongoing since  He has also had lethargy and decreased energy over the last 6 weeks by his and his family's report  Currently his wife and children are at bedside  The patient reports no sore throat, no rhinorrhea or congestion, he denies any fevers or chills although there was concern for fever at his PCPs office earlier today which prompted his ER visit  The patient denies any shortness of breath or chest pain, blurry vision or lightheadedness  He denies any nausea vomiting or abdominal pain or diarrhea  Nose and ears here frequency or urgency  He does report that for newly as long as his lethargy has had a diffuse rash primarily on his back  He said that he tried a medication for and per his wife the patient received an antibiotic as well with little relief  His daughter believes he received oral prednisone vs medrol dose yousuf  Initially was felt that perhaps the rash was improving but it did not change significantly  He reports that it itches him somewhat but not frequently  He reports no pain with the holding treated  Prior to this rash the patient denies any new medications, any new cleaning agents or detergents or exposures to metals  While the rashes started the patient did likely see the receive antibiotic which is felt to be jose j says someone, and likely an oral steroid  This rash did not significantly worsen or improving that time  He has lost approximately 13 lb over the last month without trying due to Greet decreased appetite  He is not had night sweats    He was not a smoker, he does not drink, he has no hypertension hyperlipidemia or diabetes    No family history of leukemia    Review of Systems:    Review of Systems   Constitutional: Positive for appetite change, fatigue and unexpected weight change  Negative for chills and fever  HENT: Negative for congestion, rhinorrhea and sore throat  Respiratory: Positive for cough  Negative for stridor  Cardiovascular: Negative for chest pain, palpitations and leg swelling  Gastrointestinal: Negative for abdominal pain and nausea  Skin: Positive for rash  Neurological: Positive for weakness  All other systems reviewed and are negative  Past Medical and Surgical History:     History reviewed  No pertinent past medical history  Past Surgical History:   Procedure Laterality Date    COLONOSCOPY N/A 2012    COLONOSCOPY  01/2013    No repeat is recommended  Findings were diverticulosis and mild hemorrhoids   CYSTOSCOPY      onset: 05/06/2016; diagnostic    HERNIA REPAIR  12/09/2003    VA ENDOVENOUS LASER, 1ST VEIN Left 1/15/2016    Procedure: ENDOVASCULAR LASER THERAPY (EVLT) with multiple stab phlebectomies-between 10-20; Surgeon: Clark Cheung MD;  Location: [facility];  Service: Vascular    VA PHLEB VEINS - EXTREM - TO 20 Left 1/15/2016    Procedure: LEG ENDOVENOUS LASER OBLITERATION GREATER SAPHENOUS VEIN WITH MULTIPLE VEIN LIGATIONS ;  Surgeon: Clark Cheung MD;  Location: [facility];  Service: Vascular    TONSILLECTOMY N/A        Meds/Allergies:    Prior to Admission medications    Medication Sig Start Date End Date Taking? Authorizing Provider   Ascorbic Acid (VITAMIN C) 100 MG tablet Take 100 mg by mouth daily     Yes Historical Provider, MD   Multiple Vitamins-Minerals (CENTRUM SILVER 50+MEN PO) Take 1 tablet by mouth daily 8/25/15  Yes Historical Provider, MD   Omega-3 Fatty Acids (FISH OIL) 1,000 mg Take 1 capsule by mouth   Yes Historical Provider, MD   azelastine (ASTELIN) 0 1 % nasal spray 2 sprays into each nostril 2 (two) times a day 1/20/18 2/19/18  Historical Provider, MD   chlorproMAZINE (THORAZINE) 10 mg tablet Take 1 tablet 4 times daily as needed for hiccup 2/12/18 2/19/18  Silas Khanna DO   CINNAMON PO Take by mouth  2/19/18  Historical Provider, MD   predniSONE 20 mg tablet One tablet 3 times daily for 3 days, 1 tablet twice a day for 3 days, 1 tablet daily for 4 days  Take with food 1/29/18 2/19/18  Silas Khanna DO   triamcinolone (KENALOG) 0 1 % cream APPLY TWICE A DAY TO AFFECTED AREAS ON CHEST AS DIRECTED 10/27/17 2/19/18  Historical Provider, MD     I have reviewed home medications with patient personally  Allergies: Allergies   Allergen Reactions    Iodinated Diagnostic Agents      IVP dye       Social History:     Marital Status: /Civil Union   Occupation:   Patient Pre-hospital Living Situation:   Patient Pre-hospital Level of Mobility:   Patient Pre-hospital Diet Restrictions:   Substance Use History:   History   Alcohol Use    Yes     Comment: 4 drinks per month     History   Smoking Status    Never Smoker   Smokeless Tobacco    Never Used     History   Drug use: Unknown       Family History:    Family History   Problem Relation Age of Onset    Coronary artery disease Mother     Stroke Mother     Coronary artery disease Father     Heart disease Father        Physical Exam:     Vitals:   Blood Pressure: 124/69 (02/19/18 2018)  Pulse: 74 (02/19/18 2018)  Temperature: 99 4 °F (37 4 °C) (02/19/18 2018)  Temp Source: Oral (02/19/18 2018)  Respirations: 18 (02/19/18 1845)  SpO2: 95 % (02/19/18 2018)    Physical Exam   Constitutional: He appears well-developed and well-nourished  No distress  HENT:   Head: Normocephalic and atraumatic  Right Ear: External ear normal    Left Ear: External ear normal    Mouth/Throat: No oropharyngeal exudate  Mucous membranes are dry  No posterior pharyngeal erythema   Eyes: Conjunctivae are normal  Right eye exhibits no discharge  Left eye exhibits no discharge  No scleral icterus  Neck: Normal range of motion  Cardiovascular: Normal rate, regular rhythm, normal heart sounds and intact distal pulses  Exam reveals no gallop and no friction rub  No murmur heard  Pulmonary/Chest: Effort normal and breath sounds normal  No respiratory distress  He has no wheezes  He has no rales  He exhibits no tenderness  Abdominal: Soft  He exhibits no distension  There is no tenderness  There is no rebound and no guarding  Musculoskeletal: He exhibits no edema  Neurological: He is alert  Skin: Skin is warm and dry  He is not diaphoretic  Generalized erythematous/violaceous papular/nodular rash primarily in the back and Prilosec agree the buttocks and abdomen and extensor surface of lower legs bilaterally   Vitals reviewed  Additional Data:     Lab Results: I have personally reviewed pertinent reports  Results from last 7 days  Lab Units 02/19/18  1642   WBC Thousand/uL 97 80*   HEMOGLOBIN g/dL 7 4*   HEMATOCRIT % 21 3*   PLATELETS Thousands/uL 26*   LYMPHO PCT % 8*   MONO PCT MAN % 70*   EOSINO PCT MANUAL % 0       Results from last 7 days  Lab Units 02/19/18  1642   SODIUM mmol/L 134*   POTASSIUM mmol/L 4 1   CHLORIDE mmol/L 100   CO2 mmol/L 22   BUN mg/dL 18   CREATININE mg/dL 1 33*   CALCIUM mg/dL 8 2*   TOTAL PROTEIN g/dL 7 7   BILIRUBIN TOTAL mg/dL 0 75   ALK PHOS U/L 79   ALT U/L 25   AST U/L 40   GLUCOSE RANDOM mg/dL 125           Imaging: I have personally reviewed pertinent reports   , I have personally reviewed pertinent films in PACS and no evidence of infiltrate or vascular congestion             Xr Sinuses Routine 3+ Views    Result Date: 2/1/2018  Narrative: PARANASAL SINUSES INDICATION:  Congestion  COMPARISON:  None VIEWS:  5 IMAGES:  5 FINDINGS: No evidence of air-fluid levels  There is suggestion of mucosal thickening in the left maxillary sinus  No lytic or blastic lesions are identified  Impression: Suggestion of left maxillary sinus mucosal thickening    No air-fluid levels and suggests acute sinusitis  Workstation performed: [id number]     Ct Head Without Contrast    Result Date: 2/19/2018  Narrative: CT BRAIN - WITHOUT CONTRAST INDICATION:  "generalized weakness, sent from doctor';s office" COMPARISON:  None  TECHNIQUE:  CT examination of the brain was performed  In addition to axial images, coronal reformatted images were created and submitted for interpretation  Radiation dose length product (DLP) for this visit:  1031 mGy-cm   This examination, like all CT scans performed in the [facility], was performed utilizing techniques to minimize radiation dose exposure, including the use of iterative reconstruction and automated exposure control  IMAGE QUALITY:  Diagnostic  FINDINGS:  PARENCHYMA:  Decreased attenuation is noted in the supratentorial white matter demonstrating an appearance most consistent with moderate microangiopathic change  Chronic-appearing bilateral basal ganglia lacunar infarcts  No intracranial mass, mass effect or midline shift  No CT signs of acute infarction  There is no parenchymal hemorrhage  VENTRICLES AND EXTRA-AXIAL SPACES:  Normal for patient's age  VISUALIZED ORBITS AND PARANASAL SINUSES:  Mild to moderate diffuse mucosal disease  CALVARIUM AND EXTRACRANIAL SOFT TISSUES:   Normal      Impression: No acute intracranial abnormality  Microangiopathic changes  Workstation performed: [id number]       EKG, Pathology, and Other Studies Reviewed on Admission:   · EKG: sinus rhythm  ·     Allscripts / Epic Records Reviewed: Yes     ** Please Note: This note has been constructed using a voice recognition system   **

## 2018-02-20 NOTE — TELEPHONE ENCOUNTER
Bacilio Prakash called in just to let you know that Jennifer Amos is still in the ER waiting for a room  She said that from what they have told her so far his prognosis seems pretty bleak  They are running more tests and she will keep you updated  Thank you

## 2018-02-21 PROBLEM — C92.00 AML (ACUTE MYELOBLASTIC LEUKEMIA) (HCC): Status: ACTIVE | Noted: 2018-01-01

## 2018-02-21 PROBLEM — L98.9 SKIN ABNORMALITY: Status: ACTIVE | Noted: 2018-01-01

## 2018-02-21 NOTE — ASSESSMENT & PLAN NOTE
· Of the back, papular rash, dark red in color, palpable, not blanchable  · Possible manifestation of acute leukemia?   · Dermatologist has been consulted

## 2018-02-21 NOTE — ASSESSMENT & PLAN NOTE
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

## 2018-02-21 NOTE — ASSESSMENT & PLAN NOTE
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

## 2018-02-21 NOTE — PROGRESS NOTES
Progress Note - Kole Bruner 7/24/1931, 80 y o  male MRN: [id number]    Unit/Bed#: [facility] 630-01 Encounter: [id number]    Primary Care Provider: Jackie Morgan MD   Date and time admitted to hospital: 2/20/2018  8:34 PM        Skin abnormality   Assessment & Plan    · Of the back, papular rash, dark red in color, palpable, not blanchable  · Possible manifestation of acute leukemia?   · Dermatologist has been consulted        CAP (community acquired pneumonia)   Assessment & Plan    · Unclear if patient was septic upon admission in a different campus, SIRS criteria at this time were met and also patient had acute kidney injury unclear at this time if related to severe sepsis  · Patient continues on antibiotics, day 3 of 5 of azithromycin, day 3 of 7 of Rocephin  · Continue with supportive care  · Patient remains afebrile, unreliable monitoring of WBC in settings of suspected acute myeloid leukemia        MARIA VICTORIA (acute kidney injury) ([facility] [address] )   Assessment & Plan    · Resolved, continue to monitor BMP        Thrombocytopenia ([facility] [address] )   Assessment & Plan    · 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  · Follow-up CBC in the morning, transfuse for platelets less than 20, platelets today 26        Anemia   Assessment & Plan    · 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  · Transfuse for hemoglobin less than 7 or for symptoms as per Oncology recommendation  · The hemoglobin 7 1, repeat H&H 6:00 p m  is pending         * AML (acute myeloblastic leukemia) ([facility] [address] )   Assessment & Plan    · Patient presented to other campus for evaluation of pneumonia and elevated white count, his WBC today is 91 5  · As he had abnormal WBC was transferred to our campus for bone marrow biopsy and possible chemotherapy  · Cytometry on blood sample is pending to rule out M3 subtype leukemia, when this ruled out patient might be offered chemotherapy, palliative chemotherapy as per my discussion with Oncology  · Follow-up bone marrow biopsy  · Follow-up cytometry  · Palliative Care has been involved for advanced plan of care for this patient whose prognosis regardless remains very poor, appreciated consultant note          VTE Pharmacologic Prophylaxis:  No  Pharmacologic: Severe thrombocytopenia  Mechanical VTE Prophylaxis in Place: Yes    Patient Centered Rounds: I have performed bedside rounds with nursing staff today  Discussions with Specialists or Other Care Team Provider:  Oncology    Education and Discussions with Family / Patient:  Patient, call attempted to his wife, left a message    Time Spent for Care: 45 minutes  More than 50% of total time spent on counseling and coordination of care as described above  Current Length of Stay: 1 day(s)    Current Patient Status: Inpatient   Certification Statement: The patient will continue to require additional inpatient hospital stay due to Refer to above    Discharge Plan: To be determined    Code Status: Level 3 - DNAR and DNI      Subjective:   Patient states that he is feeling well, he has no complaints at this time  Objective:     Vitals:   Temp (24hrs), Av 8 °F (37 1 °C), Min:98 °F (36 7 °C), Max:99 9 °F (37 7 °C)    HR:  [76-88] 80  Resp:  [18-25] 20  BP: (101-130)/(51-78) 113/65  SpO2:  [95 %-99 %] 95 %  Body mass index is 23 19 kg/m²  Input and Output Summary (last 24 hours): Intake/Output Summary (Last 24 hours) at 18 1824  Last data filed at 18 1531   Gross per 24 hour   Intake                0 ml   Output             2500 ml   Net            -2500 ml       Physical Exam:     Physical Exam   Constitutional: He is oriented to person, place, and time  He appears well-developed  Cardiovascular: Normal rate, regular rhythm and normal heart sounds  Exam reveals no friction rub  No murmur heard  Pulmonary/Chest: Effort normal  No respiratory distress  He has no wheezes  He has no rales  Abdominal: Soft  He exhibits no distension  There is no tenderness   There is no rebound  Musculoskeletal: He exhibits no edema  Neurological: He is alert and oriented to person, place, and time  He exhibits normal muscle tone  Skin:   Diffuse papular rash of the back   Psychiatric: He has a normal mood and affect  Additional Data:     Labs:      Results from last 7 days  Lab Units 02/21/18  1800 02/21/18  0522   WBC Thousand/uL  --  91 50*   HEMOGLOBIN g/dL 7 5* 7 1*   HEMATOCRIT % 21 2* 19 8*   PLATELETS Thousands/uL  --  26*   LYMPHO PCT %  --  16   MONO PCT MAN %  --  57*   EOSINO PCT MANUAL %  --  0       Results from last 7 days  Lab Units 02/21/18  0522 02/20/18  0606   SODIUM mmol/L 137 137   POTASSIUM mmol/L 3 6 3 8   CHLORIDE mmol/L 108 106   CO2 mmol/L 20* 20*   BUN mg/dL 12 15   CREATININE mg/dL 1 05 1 31*   CALCIUM mg/dL 7 0* 7 3*   TOTAL PROTEIN g/dL  --  6 3*   BILIRUBIN TOTAL mg/dL  --  0 66   ALK PHOS U/L  --  66   ALT U/L  --  24   AST U/L  --  37   GLUCOSE RANDOM mg/dL 89 116       Results from last 7 days  Lab Units 02/20/18  0607   INR  1 39*       * I Have Reviewed All Lab Data Listed Above  * Additional Pertinent Lab Tests Reviewed: All Labs Within Last 24 Hours Reviewed    Imaging:    Imaging Reports Reviewed Today Include:  None  Imaging Personally Reviewed by Myself Includes:  None    Recent Cultures (last 7 days):       Results from last 7 days  Lab Units 02/21/18  0906 02/20/18  1457 02/19/18  1708 02/19/18  1642   BLOOD CULTURE   --   --   --  No Growth at 24 hrs  No Growth at 24 hrs     GRAM STAIN RESULT  1+ Epithelial cells per low power field  Rare Polys  3+ Gram negative rods  1+ Gram positive cocci in pairs  Rare Gram positive rods  --   --   --    INFLUENZA A PCR   --   --  None Detected  --    INFLUENZA B PCR   --   --  None Detected  --    RSV PCR   --   --  None Detected  --    LEGIONELLA URINARY ANTIGEN   --  Negative  --   --        Last 24 Hours Medication List:     Current Facility-Administered Medications:  acetaminophen 650 mg Oral Q6H PRN Cayla Muller MD    azithromycin 500 mg Intravenous Q24H Cayla Muller MD Last Rate: 500 mg (02/21/18 0126)   benzonatate 100 mg Oral TID PRN Cayla Muller MD    cefTRIAXone 1,000 mg Intravenous Q24H Cayla Muller MD Last Rate: 1,000 mg (02/21/18 1531)   guaiFENesin 600 mg Oral Q12H [address] Cayla Muller MD    hydrOXYzine HCL 25 mg Oral Q6H PRN Cayla Muller MD         Today, Patient Was Seen By: Parrish Suh MD    ** Please Note: Dragon 360 Dictation voice to text software may have been used in the creation of this document   **

## 2018-02-21 NOTE — ASSESSMENT & PLAN NOTE
· Patient presented to other campus for evaluation of pneumonia and elevated white count, his WBC today is 91 5  · As he had abnormal WBC was transferred to our campus for bone marrow biopsy and possible chemotherapy  · Cytometry on blood sample is pending to rule out M3 subtype leukemia, when this ruled out patient might be offered chemotherapy, palliative chemotherapy as per my discussion with Oncology  · Follow-up bone marrow biopsy  · Follow-up cytometry  · Palliative Care has been involved for advanced plan of care for this patient whose prognosis regardless remains very poor, appreciated consultant note

## 2018-02-21 NOTE — ASSESSMENT & PLAN NOTE
· Unclear if patient was septic upon admission in a different campus, SIRS criteria at this time were met and also patient had acute kidney injury unclear at this time if related to severe sepsis  · Patient continues on antibiotics, day 3 of 5 of azithromycin, day 3 of 7 of Rocephin  · Continue with supportive care  · Patient remains afebrile, unreliable monitoring of WBC in settings of suspected acute myeloid leukemia

## 2018-02-21 NOTE — CONSULTS
Oncology Consult Note  Margo Clemente 80 y o  male MRN: [id number]  Unit/Bed#: [facility] 630-01 Encounter: [id number]      Presenting Complaint: Probable Leukemia    History of Presenting Illness:    Francisco Marx a 80 y  o  male who presented to [facility] 2/19/2018 regarding  Progressive fatigue over past 6 weeks  He also had fever and  nonproductive cough  He was started on amoxicillin 4 weeks ago for diagnosis of sinusitis  He also had a diffuse rash on his back  He did receive Medrol Dosepak without much improvement  He has had decreased appetite and lost 13 pounds over the prior month without trying  Denies lightheadedness  Denies any easy bruising or bleeding  He denies any chest pain, shortness of breath, chills  No dizziness or light headedness        He overall is in good health  He is very active        He does have a rash on his back which he states "looks worse than what it feels like "    The rest of his 14 point review of systems was negative  Review of Systems - As stated in the HPI otherwise the fourteen point review of systems was negative  History reviewed  No pertinent past medical history      Social History     Social History    Marital status: /Civil Union     Spouse name: N/A    Number of children: N/A    Years of education: N/A     Occupational History    Retired      Social History Main Topics    Smoking status: Never Smoker    Smokeless tobacco: Never Used    Alcohol use Yes      Comment: 4 drinks per month    Drug use: Unknown    Sexual activity: Not Asked     Other Topics Concern    None     Social History Narrative    No secondhand smoke exposure       Family History   Problem Relation Age of Onset    Coronary artery disease Mother     Stroke Mother     Coronary artery disease Father     Heart disease Father        Allergies   Allergen Reactions    Iodinated Diagnostic Agents      IVP dye         Current Facility-Administered Medications:    acetaminophen (TYLENOL) tablet 650 mg, 650 mg, Oral, Q6H PRN, Kaylin Yen MD    [facility]) 500 mg in sodium chloride 0 9% 250mL IVPB 500 mg, 500 mg, Intravenous, Q24H, Kaylin Yen MD, Last Rate: 250 mL/hr at 02/21/18 0126, 500 mg at 02/21/18 0126    benzonatate (TESSALON PERLES) capsule 100 mg, 100 mg, Oral, TID PRN, Kaylin Yen MD    cefTRIAXone (ROCEPHIN) IVPB (premix) 1,000 mg, 1,000 mg, Intravenous, Q24H, Kaylin Yen MD    guaiFENesin [facility]) 12 hr tablet 600 mg, 600 mg, Oral, Q12H [address], Kaylin Yen MD, 600 mg at 02/21/18 1038    hydrOXYzine HCL (ATARAX) tablet 25 mg, 25 mg, Oral, Q6H PRN, Kaylin Yen MD      /62   Pulse 88   Temp 98 1 °F (36 7 °C) (Oral)   Resp 20   Ht 5' 10" (1 778 m)   SpO2 98%     General Appearance:    Alert, oriented        Eyes:    PERRL   Ears:    Normal external ear canals, both ears   Nose:   Nares normal, septum midline   Throat:   Mucosa moist  Pharynx without injection  Neck:   Supple       Lungs:     Clear to auscultation bilaterally   Chest Wall:    No tenderness or deformity    Heart:    Regular rate and rhythm       Abdomen:     Soft, non-tender, bowel sounds +, no organomegaly           Extremities:   Extremities no cyanosis or edema       Skin:    Diffuse scabbed appearance across his back  Lymph nodes:   Cervical, supraclavicular, and axillary nodes normal   Neurologic:   CNII-XII intact, normal strength, sensation and reflexes     Throughout               Recent Results (from the past 48 hour(s))   Blood culture #1    Collection Time: 02/19/18  4:42 PM   Result Value Ref Range    Blood Culture No Growth at 24 hrs  Blood culture #2    Collection Time: 02/19/18  4:42 PM   Result Value Ref Range    Blood Culture No Growth at 24 hrs      CBC and differential    Collection Time: 02/19/18  4:42 PM   Result Value Ref Range    WBC 97 80 (HH) 4 31 - 10 16 Thousand/uL    RBC 2 30 (L) 3 88 - 5 62 Million/uL    Hemoglobin 7 4 (L) 12 0 - 17 0 g/dL Hematocrit 21 3 (L) 36 5 - 49 3 %    MCV 93 82 - 98 fL    MCH 32 2 26 8 - 34 3 pg    MCHC 34 7 31 4 - 37 4 g/dL    RDW 14 1 11 6 - 15 1 %    MPV 10 3 8 9 - 12 7 fL    Platelets 26 (LL) 627 - 390 Thousands/uL    nRBC 0 /100 WBCs   Comprehensive metabolic panel    Collection Time: 02/19/18  4:42 PM   Result Value Ref Range    Sodium 134 (L) 136 - 145 mmol/L    Potassium 4 1 3 5 - 5 3 mmol/L    Chloride 100 100 - 108 mmol/L    CO2 22 21 - 32 mmol/L    Anion Gap 12 4 - 13 mmol/L    BUN 18 5 - 25 mg/dL    Creatinine 1 33 (H) 0 60 - 1 30 mg/dL    Glucose 125 65 - 140 mg/dL    Calcium 8 2 (L) 8 3 - 10 1 mg/dL    AST 40 5 - 45 U/L    ALT 25 12 - 78 U/L    Alkaline Phosphatase 79 46 - 116 U/L    Total Protein 7 7 6 4 - 8 2 g/dL    Albumin 3 2 (L) 3 5 - 5 0 g/dL    Total Bilirubin 0 75 0 20 - 1 00 mg/dL    eGFR 48 ml/min/1 73sq m   Lactic acid x2    Collection Time: 02/19/18  4:42 PM   Result Value Ref Range    LACTIC ACID 1 3 0 5 - 2 0 mmol/L   Manual Differential(PHLEBS Do Not Order)    Collection Time: 02/19/18  4:42 PM   Result Value Ref Range    Segmented % 0 (L) 43 - 75 %    Lymphocytes % 8 (L) 14 - 44 %    Monocytes % 70 (H) 4 - 12 %    Eosinophils % 0 0 - 6 %    Basophils % 0 0 - 1 %    Blasts % 14 (H) <=0 %    Atypical Lymphocytes % 8 (H) <=0 %    Absolute Neutrophils 0 00 (L) 1 85 - 7 62 Thousand/uL    Lymphocytes Absolute 7 82 (H) 0 60 - 4 47 Thousand/uL    Monocytes Absolute 68 46 (H) 0 00 - 1 22 Thousand/uL    Eosinophils Absolute 0 00 0 00 - 0 40 Thousand/uL    Basophils Absolute 0 00 0 00 - 0 10 Thousand/uL    Total Counted 100     Anisocytosis Present     Platelet Estimate Decreased (A) Adequate   Influenza A/B and RSV by PCR (indicated for patients >2 mo of age)    Collection Time: 02/19/18  5:08 PM   Result Value Ref Range    INFLU A PCR None Detected None Detected    INFLU B PCR None Detected None Detected    RSV PCR None Detected None Detected   ECG 12 lead    Collection Time: 02/19/18  5:13 PM   Result Value Ref Range    Ventricular Rate 90 BPM    Atrial Rate 90 BPM    PA Interval 162 ms    QRSD Interval 88 ms    QT Interval 362 ms    QTC Interval 442 ms    P Axis 55 degrees    QRS Axis -10 degrees    T Wave Axis 47 degrees   Path Slide Review    Collection Time: 02/19/18  6:01 PM   Result Value Ref Range    Path Review       Leukocytosis with circulating blasts, monocytosis including atypical/immature forms  Thrombocytopenia  Recommend hematology consult and flow cytometry for immunophenotyping to exclude acute leukemia    Dr Zayra Fernández notified, 02/20/18 at 9:30 am   Dr Connie Stoner, 02/20/18   ED Urine Macroscopic    Collection Time: 02/19/18  6:46 PM   Result Value Ref Range    Color, UA Yellow     Clarity, UA Clear     pH, UA 7 0 4 5 - 8 0    Leukocytes, UA Negative Negative    Nitrite, UA Negative Negative    Protein,  (2+) (A) Negative mg/dl    Glucose, UA Negative Negative mg/dl    Ketones, UA Negative Negative mg/dl    Urobilinogen, UA 0 2 0 2, 1 0 E U /dl E U /dl    Bilirubin, UA Negative Negative    Blood, UA Moderate (A) Negative    Specific Gravity, UA 1 010 1 003 - 1 030   Urine Microscopic    Collection Time: 02/19/18  6:46 PM   Result Value Ref Range    RBC, UA 10-20 (A) None Seen, 0-5 /hpf    WBC, UA 2-4 (A) None Seen, 0-5, 5-55, 5-65 /hpf    Epithelial Cells Occasional None Seen, Occasional /hpf    Bacteria, UA Occasional None Seen, Occasional /hpf    Fine granular casts 0-1 /lpf   Type and screen    Collection Time: 02/19/18  6:47 PM   Result Value Ref Range    ABO Grouping O     Rh Factor Positive     Antibody Screen Negative     Specimen Expiration Date [id number]    Sodium, urine, random    Collection Time: 02/19/18 11:09 PM   Result Value Ref Range    Sodium, Ur 41    Creatinine, urine, random    Collection Time: 02/19/18 11:09 PM   Result Value Ref Range    Creatinine, Ur 95 1 mg/dL   Protime-INR    Collection Time: 02/19/18 11:10 PM   Result Value Ref Range    Protime 16 9 (H) 12 1 - 14 4 seconds    INR 1 37 (H) 0 86 - 1 16   APTT    Collection Time: 02/19/18 11:10 PM   Result Value Ref Range    PTT 46 (H) 23 - 35 seconds   Comprehensive metabolic panel    Collection Time: 02/20/18  6:06 AM   Result Value Ref Range    Sodium 137 136 - 145 mmol/L    Potassium 3 8 3 5 - 5 3 mmol/L    Chloride 106 100 - 108 mmol/L    CO2 20 (L) 21 - 32 mmol/L    Anion Gap 11 4 - 13 mmol/L    BUN 15 5 - 25 mg/dL    Creatinine 1 31 (H) 0 60 - 1 30 mg/dL    Glucose 116 65 - 140 mg/dL    Calcium 7 3 (L) 8 3 - 10 1 mg/dL    AST 37 5 - 45 U/L    ALT 24 12 - 78 U/L    Alkaline Phosphatase 66 46 - 116 U/L    Total Protein 6 3 (L) 6 4 - 8 2 g/dL    Albumin 2 5 (L) 3 5 - 5 0 g/dL    Total Bilirubin 0 66 0 20 - 1 00 mg/dL    eGFR 49 ml/min/1 73sq m   Magnesium    Collection Time: 02/20/18  6:06 AM   Result Value Ref Range    Magnesium 2 0 1 6 - 2 6 mg/dL   Phosphorus    Collection Time: 02/20/18  6:06 AM   Result Value Ref Range    Phosphorus 2 7 2 3 - 4 1 mg/dL   CBC and differential    Collection Time: 02/20/18  6:06 AM   Result Value Ref Range    WBC 79 46 (HH) 4 31 - 10 16 Thousand/uL    RBC 2 12 (L) 3 88 - 5 62 Million/uL    Hemoglobin 6 9 (LL) 12 0 - 17 0 g/dL    Hematocrit 19 7 (L) 36 5 - 49 3 %    MCV 93 82 - 98 fL    MCH 32 5 26 8 - 34 3 pg    MCHC 35 0 31 4 - 37 4 g/dL    RDW 14 0 11 6 - 15 1 %    MPV 10 3 8 9 - 12 7 fL    Platelets 23 (LL) 771 - 390 Thousands/uL    nRBC 0 /100 WBCs   Manual Differential(PHLEBS Do Not Order)    Collection Time: 02/20/18  6:06 AM   Result Value Ref Range    Segmented % 0 (L) 43 - 75 %    Bands % 1 0 - 8 %    Lymphocytes % 11 (L) 14 - 44 %    Monocytes % 73 (H) 4 - 12 %    Eosinophils % 0 0 - 6 %    Basophils % 0 0 - 1 %    Blasts % 9 (H) <=0 %    Atypical Lymphocytes % 6 (H) <=0 %    Absolute Neutrophils 0 79 (L) 1 85 - 7 62 Thousand/uL    Lymphocytes Absolute 8 74 (H) 0 60 - 4 47 Thousand/uL    Monocytes Absolute 58 01 (H) 0 00 - 1 22 Thousand/uL    Eosinophils Absolute 0 00 0 00 - 0 40 Thousand/uL    Basophils Absolute 0 00 0 00 - 0 10 Thousand/uL    Total Counted 100     Anisocytosis Present     Platelet Estimate Decreased (A) Adequate   LD,Blood    Collection Time: 02/20/18  6:06 AM   Result Value Ref Range     (H) 81 - 234 U/L   Uric acid    Collection Time: 02/20/18  6:06 AM   Result Value Ref Range    Uric Acid 7 3 4 2 - 8 0 mg/dL   Protime-INR    Collection Time: 02/20/18  6:07 AM   Result Value Ref Range    Protime 17 1 (H) 12 1 - 14 4 seconds    INR 1 39 (H) 0 86 - 1 16   APTT    Collection Time: 02/20/18  6:07 AM   Result Value Ref Range    PTT 41 (H) 23 - 35 seconds   Legionella antigen, urine    Collection Time: 02/20/18  2:57 PM   Result Value Ref Range    Legionella Urinary Antigen Negative Negative   Hemoglobin and hematocrit, blood    Collection Time: 02/20/18 10:40 PM   Result Value Ref Range    Hemoglobin 7 2 (L) 12 0 - 17 0 g/dL    Hematocrit 20 8 (L) 36 5 - 49 3 %   Fibrin split products    Collection Time: 02/20/18 10:40 PM   Result Value Ref Range    FDP <10 <10, >40 <80   Fibrinogen    Collection Time: 02/20/18 10:40 PM   Result Value Ref Range    Fibrinogen 487 227 - 495 mg/dL   Hemolysis Smear    Collection Time: 02/20/18 10:40 PM   Result Value Ref Range    Hemolysis Smear No Schistocytes or Helmet Cells noted    Basic metabolic panel    Collection Time: 02/21/18  5:22 AM   Result Value Ref Range    Sodium 137 136 - 145 mmol/L    Potassium 3 6 3 5 - 5 3 mmol/L    Chloride 108 100 - 108 mmol/L    CO2 20 (L) 21 - 32 mmol/L    Anion Gap 9 4 - 13 mmol/L    BUN 12 5 - 25 mg/dL    Creatinine 1 05 0 60 - 1 30 mg/dL    Glucose 89 65 - 140 mg/dL    Calcium 7 0 (L) 8 3 - 10 1 mg/dL    eGFR 64 ml/min/1 73sq m   CBC and differential    Collection Time: 02/21/18  5:22 AM   Result Value Ref Range    WBC 91 50 (HH) 4 31 - 10 16 Thousand/uL    RBC 2 17 (L) 3 88 - 5 62 Million/uL    Hemoglobin 7 1 (L) 12 0 - 17 0 g/dL    Hematocrit 19 8 (L) 36 5 - 49 3 %    MCV 91 82 - 98 fL    MCH 32 7 26 8 - 34 3 pg    MCHC 35 9 31 4 - 37 4 g/dL    RDW 14 3 11 6 - 15 1 %    MPV 9 6 8 9 - 12 7 fL    Platelets 26 (LL) 969 - 390 Thousands/uL    nRBC 0 /100 WBCs   Hemoglobin A1c    Collection Time: 02/21/18  5:22 AM   Result Value Ref Range    Hemoglobin A1C 7 6 (H) 4 2 - 6 3 %     mg/dl   Lipid Panel with Direct LDL reflex    Collection Time: 02/21/18  5:22 AM   Result Value Ref Range    Cholesterol 61 50 - 200 mg/dL    Triglycerides 77 <=150 mg/dL    HDL, Direct 24 (L) 40 - 60 mg/dL    LDL Calculated 22 0 - 100 mg/dL   Phosphorus    Collection Time: 02/21/18  5:22 AM   Result Value Ref Range    Phosphorus 2 4 2 3 - 4 1 mg/dL   Iron Saturation %    Collection Time: 02/21/18  5:22 AM   Result Value Ref Range    Iron Saturation 77 %    TIBC 148 (L) 250 - 450 ug/dL    Iron 114 65 - 175 ug/dL   Ferritin    Collection Time: 02/21/18  5:22 AM   Result Value Ref Range    Ferritin 1,008 (H) 8 - 388 ng/mL   Manual Differential(PHLEBS Do Not Order)    Collection Time: 02/21/18  5:22 AM   Result Value Ref Range    Segmented % 1 (L) 43 - 75 %    Lymphocytes % 16 14 - 44 %    Monocytes % 57 (H) 4 - 12 %    Eosinophils % 0 0 - 6 %    Basophils % 0 0 - 1 %    Blasts % 3 (H) <=0 %    Atypical Lymphocytes % 23 (H) <=0 %    Absolute Neutrophils 0 92 (L) 1 85 - 7 62 Thousand/uL    Lymphocytes Absolute 14 64 (H) 0 60 - 4 47 Thousand/uL    Monocytes Absolute 52 16 (H) 0 00 - 1 22 Thousand/uL    Eosinophils Absolute 0 00 0 00 - 0 40 Thousand/uL    Basophils Absolute 0 00 0 00 - 0 10 Thousand/uL    Total Counted      RBC Morphology Present     Poikilocytes Present     Platelet Estimate Decreased (A) Adequate   Prepare RBC:Special Requirements: Leukoreduced; Has consent been obtained? Yes; Where is the Surgery Scheduled?  [facility], 1 Units    Collection Time: 02/21/18  5:30 AM   Result Value Ref Range    Unit Product Code D7970L36     Unit Number [id number]     Unit ABO O     Unit [facility] POS     Crossmatch Compatible     Unit Dispense Status Presumed Trans          Xr Sinuses Routine 3+ Views    Result Date: 2/1/2018  Narrative: PARANASAL SINUSES INDICATION:  Congestion  COMPARISON:  None VIEWS:  5 IMAGES:  5 FINDINGS: No evidence of air-fluid levels  There is suggestion of mucosal thickening in the left maxillary sinus  No lytic or blastic lesions are identified  Impression: Suggestion of left maxillary sinus mucosal thickening  No air-fluid levels and suggests acute sinusitis  Workstation performed: [id number]     X-ray Chest 2 Views    Result Date: 2/20/2018  Narrative: CHEST INDICATION:  Cough  Fever  COMPARISON:  April 27, 2014 VIEWS:  Frontal and lateral projections IMAGES:  3 FINDINGS:  Lungs adequately aerated  No consolidation or effusion  Perihilar infiltrate in the right upper lobe  Cardiac size within normal limits  No vascular congestion or peribronchial thickening  No pneumothorax or free air  Visualized osseous structures appear within normal limits for the patient's age  Impression: Right upper lobe infiltrate  Consider infectious process  Workstation performed: [id number]     Ct Head Without Contrast    Result Date: 2/19/2018  Narrative: CT BRAIN - WITHOUT CONTRAST INDICATION:  "generalized weakness, sent from doctor';s office" COMPARISON:  None  TECHNIQUE:  CT examination of the brain was performed  In addition to axial images, coronal reformatted images were created and submitted for interpretation  Radiation dose length product (DLP) for this visit:  1031 mGy-cm   This examination, like all CT scans performed in the [facility], was performed utilizing techniques to minimize radiation dose exposure, including the use of iterative reconstruction and automated exposure control  IMAGE QUALITY:  Diagnostic   FINDINGS:  PARENCHYMA:  Decreased attenuation is noted in the supratentorial white matter demonstrating an appearance most consistent with moderate microangiopathic change  Chronic-appearing bilateral basal ganglia lacunar infarcts  No intracranial mass, mass effect or midline shift  No CT signs of acute infarction  There is no parenchymal hemorrhage  VENTRICLES AND EXTRA-AXIAL SPACES:  Normal for patient's age  VISUALIZED ORBITS AND PARANASAL SINUSES:  Mild to moderate diffuse mucosal disease  CALVARIUM AND EXTRACRANIAL SOFT TISSUES:   Normal      Impression: No acute intracranial abnormality  Microangiopathic changes  Workstation performed: [id number]       Assessment and Plan:    1  Leukocytosis with peripheral blasts  2    Anemia  Hemoglobin 7 g/dL  3  Thrombocytopenia  Platelet count 09,413     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        Reviewed CBC results and suspected diagnosis with the patient  Reviewed that prognosis is poor with or without treatment  Due to his age, he is not a candidate for high-dose induction chemotherapy  Treatment options which would very unlikely cure his disease includes treatment with Vidaza or Dacogen for non-M3 disease  Bone marrow aspiration and biopsy needed to evaluate      Discussed option of no treatment  Discussed that with treatment the primary side effect is low blood counts and the patient that already has pancytopenia  Is anticipated that he will have blood counts checked multiple times a week and transfusions of packed red blood cells and platelets are anticipated should he elect to proceed with treatment  Discussed that cure is very unlikely  The more realistic goal is to improve his bone marrow and to reduce the frequency of transfusions  It may take 4-6 months until we would know whether treatment were effective        His risk of infection including sepsis is extremely high    We did discuss that should he experience any signs of infection which actually might not be as pronounced in a less immunocompromised patient, he is to notify our office or seek medical attention         We did review stringent neutropenic precautions including avoidance of fresh fruits and vegetables any potential sick contacts  He is also at risk of bleeding due to thrombocytopenia  Of most concern is potential intracranial bleed  We did discuss thrombocytopenic precautions      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 treatment could have potentially fatal consequences  Currently No evidence of DIC  Once we get flow cytometry results and 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 we will start treatment  He is currently Asymptomatic  At this point I would continue to transfuse as needed  Recommendation would be transfuse platelets if less than 20 and transfuse blood if hemoglobin less than 7 or if symptomatic  Flow cytometry and peripheral blood sugar of been sent at [facility] but if not we should send that off here at [place]        Addendum:  Patient's family contact information is as follows: Wife Nick Gay home [phone]    Daughter-in-law Nikhil Suazo cell [phone];  Son Omayra Ho cell # [phone]  Stevo Akuakathryn  Cell # [phone]     Per patient, may speak to all patient's above

## 2018-02-21 NOTE — TELEPHONE ENCOUNTER
Pt wife called back to see why you called her- she said her  was in the hospital and that you seen him there- she said you left a messege on er machine to call her back

## 2018-02-21 NOTE — BRIEF OP NOTE (RAD/CATH)
Bone marrow biopsy Procedure Note    PATIENT NAME: Geoffrey Conteh  : 1931  MRN: [id number]     Pre-op Diagnosis:   1  Acute [facility] ([facility] )    2  Atopic dermatitis, unspecified type      Post-op Diagnosis:   1  Acute leukemia ([facility] )    2  Atopic dermatitis, unspecified type        Surgeon:   Gabino Larose MD    Estimated Blood Loss:  Minimal  Findings:  Bone marrow biopsy with image guidance  Adequate      Specimens:  Aspirate and core    Complications:  None    Anesthesia: Conscious sedation    Gabino Larose MD     Date: 2018  Time: 2:48 PM

## 2018-02-21 NOTE — H&P
History and Physical - [facility] Internal Medicine    Patient Information: Jannie Harley 80 y o  male MRN: [id number]  Unit/Bed#: [facility] 630-01 Encounter: [id number]  Admitting Physician: Cholo Schuster MD  PCP: Abdelrahman Malone MD  Date of Admission:  02/21/18      Chief Complaint: transfer for chemo    History of Present Illness:   Jannie Harley is a 80 y o  male with a medical history significant for squamous cell ca, melanoma who is transferred from [facility] for palliative chemo  He presented at [facility] on 2/19 with lethargy, fevers, and cough along with a diffuse rash on his back  He was found to have a Hb of 7 4, MCV of 93, platelet count 63633, WBC 50578, 0 neut identified, 8% lymph, 70% mono, 14% blasts, 8% atypical lymphocytes  He was seen by heme/onc and he is suspected to have AML with poor prognosis  Plan to proceed with palliative chemo and IR bone biopsy  Course at [facility] also noted for MARIA VICTORIA  For anemia, he was transfused 1U prbc prior to transfer  He was also started on CTX/azithro for possible RUL CAP, legionella and strep pneumo pending  On my evaluation, patient reports feeling well  No chest pain, sob, abdominal pain, vomiting, diarrhea, bloody/black stools, cough, weakness/numbness  Past Medical  As above    Past Surgical History:   Past Surgical History:   Procedure Laterality Date    COLONOSCOPY N/A 2012    COLONOSCOPY  01/2013    No repeat is recommended  Findings were diverticulosis and mild hemorrhoids   CYSTOSCOPY      onset: 05/06/2016; diagnostic    HERNIA REPAIR  12/09/2003    [place] ENDOVENOUS LASER, 1ST VEIN Left 1/15/2016    Procedure: ENDOVASCULAR LASER THERAPY (EVLT) with multiple stab phlebectomies-between 10-20;   Surgeon: Kurt Booker MD;  [facility]: [facility];  Service: Vascular    [place] PHLEB VEINS - EXTREM - TO 20 Left 1/15/2016    Procedure: LEG ENDOVENOUS LASER OBLITERATION GREATER SAPHENOUS VEIN WITH MULTIPLE VEIN LIGATIONS ;  Surgeon: Kurt Booker MD;  [facility];  Service: Vascular    TONSILLECTOMY N/A        Home Medications:   Prior to Admission medications    Medication Sig Start Date End Date Taking? Authorizing Provider   Ascorbic Acid (VITAMIN C) 100 MG tablet Take 100 mg by mouth daily  Historical Provider, MD   Multiple Vitamins-Minerals (CENTRUM SILVER 50+MEN PO) Take 1 tablet by mouth daily 8/25/15   Historical Provider, MD   Omega-3 Fatty Acids (FISH OIL) 1,000 mg Take 1 capsule by mouth    Historical Provider, MD       Allergies: Allergies   Allergen Reactions    Iodinated Diagnostic Agents      IVP dye       Social History:  Social History   Substance Use Topics    Smoking status: Never Smoker    Smokeless tobacco: Never Used    Alcohol use Yes      Comment: 4 drinks per month     History   Drug use: Unknown       Family History:   Family History   Problem Relation Age of Onset    Coronary artery disease Mother     Stroke Mother     Coronary artery disease Father     Heart disease Father        Review of Systems:    All other review of systems reviewed and are negative except for as above in HPI  Physical Exam:    Temp: 99 3 °F (37 4 °C) (Oral)  HR:  [79-92] 82  Resp:  [16-18] 18  BP: (102-135)/(58-78) 127/62  SpO2:  [95 %-98 %] 95 %  There is no height or weight on file to calculate BMI    Height: 5' 10" (177 8 cm)         Intake/Output Summary (Last 24 hours) at 02/21/18 0045  Last data filed at 02/20/18 2035   Gross per 24 hour   Intake                0 ml   Output              200 ml   Net             -200 ml       General: Awake, alert, no acute distress  HEENT: NC/AT, EOMI, mmm  Neck: supple, no LAD  Lungs: CTA bl, no w/c/r  Heart[de-identified] regular, nl S1/S2, no appreciable murmurs  Abdomen: +BS, soft, NT/ND  Back: no spinal tenderness  Ext: no LE edema  Skin: +rash on back  Neuro: 5/5 strength throughout, nl sensation    Labs:  Recent Labs      02/19/18   1642  02/20/18   0606   NA  134*  137   K  4 1  3 8   CL  100  106   CO2  22  20*   BUN 18  15   CREATININE  1 33*  1 31*   CALCIUM  8 2*  7 3*   MG   --   2 0   PHOS   --   2 7       Recent Labs      02/19/18   1642  02/20/18   0606  02/20/18   2240   WBC  97 80*  79 46*   --    HGB  7 4*  6 9*  7 2*   HCT  21 3*  19 7*  20 8*   PLT  26*  23*   --    SEGSPCT  0*  0*   --    BASOPCT  0  0   --    EOSPCT  0  0   --    EOSABS  0 00  0 00   --    BASOSABS  0 00  0 00   --      Recent Labs      02/20/18   0606   ALT  24   AST  37   ALKPHOS  66   BILITOT  0 66      No results for input(s): TROPONINI in the last 72 hours  Lab Results   Component Value Date    INR 1 39 (H) 02/20/2018    INR 1 37 (H) 02/19/2018    INR 1 14 04/27/2014    PROTIME 17 1 (H) 02/20/2018    PROTIME 16 9 (H) 02/19/2018    PROTIME 13 9 04/27/2014     Recent Labs      02/19/18   1846   KETONESU  Negative   SPECGRAV  1 010   PROTEINUA  100 (2+)*   UROBILINOGEN  0 2   LEUKOCYTESUR  Negative       I have personally reviewed pertinent reports  and I have personally reviewed pertinent films in PACS    Imaging:    X-ray Chest 2 Views    Result Date: 2/20/2018  Narrative: CHEST INDICATION:  Cough  Fever  COMPARISON:  April 27, 2014 VIEWS:  Frontal and lateral projections IMAGES:  3 FINDINGS:  Lungs adequately aerated  No consolidation or effusion  Perihilar infiltrate in the right upper lobe  Cardiac size within normal limits  No vascular congestion or peribronchial thickening  No pneumothorax or free air  Visualized osseous structures appear within normal limits for the patient's age  Impression: Right upper lobe infiltrate  Consider infectious process  Workstation performed: [id number]     Ct Head Without Contrast    Result Date: 2/19/2018  Narrative: CT BRAIN - WITHOUT CONTRAST INDICATION:  "generalized weakness, sent from doctor';s office" COMPARISON:  None  TECHNIQUE:  CT examination of the brain was performed  In addition to axial images, coronal reformatted images were created and submitted for interpretation    Radiation dose length product (DLP) for this visit:  1031 mGy-cm   This examination, like all CT scans performed in the [facility], was performed utilizing techniques to minimize radiation dose exposure, including the use of iterative reconstruction and automated exposure control  IMAGE QUALITY:  Diagnostic  FINDINGS:  PARENCHYMA:  Decreased attenuation is noted in the supratentorial white matter demonstrating an appearance most consistent with moderate microangiopathic change  Chronic-appearing bilateral basal ganglia lacunar infarcts  No intracranial mass, mass effect or midline shift  No CT signs of acute infarction  There is no parenchymal hemorrhage  VENTRICLES AND EXTRA-AXIAL SPACES:  Normal for patient's age  VISUALIZED ORBITS AND PARANASAL SINUSES:  Mild to moderate diffuse mucosal disease  CALVARIUM AND EXTRACRANIAL SOFT TISSUES:   Normal      Impression: No acute intracranial abnormality  Microangiopathic changes  Workstation performed: [id number]       EKbpm, sinus, no acute changes    I have personally reviewed pertinent reports  and I have personally reviewed pertinent films in PACS    Assessment/Plan:   80 y o  male with a medical history significant for squamous cell ca, melanoma who initially presented to [facility] with lethargy, fevers, and cough along with a diffuse rash on his back, found with labs c/f AML  Transferred to B for palliative chemo  # Suspected AML  - leukocytosis with predominantly monocytosis   C/f AML  - IR bone marrow bx ordered  - oncology consult  - awaiting palliative chemo and would need to monitor for tumor lysis syndrome  - note high risk of infection  - neutropenic precautions    # RUL infiltrate  - infectious vs leukemic infiltrate  - will cont on CTX/azithro for now to complete course for CAP    # MARIA VICTORIA  - Cr 1 33 from 0 98 in Apri 2016  - s/p 1L NS and continuous IVFs  - encourage PO intake  - avoid nephrotoxins  - f/u Cr in am    # Anemia  - Hb 6 9 and s/p 1U PRBC (note blood products to be leukoreduced)  - f/u post transfusion Hb is 7 2  - will need to recheck labs in am for trend  - transfuse for Hb < 7    # Thrombocytopenia   - no s/o bleeding, monitor  - transfuse for platelets < 10    # SIRS - f/u BCx from ED    # Rash  - c/f leukemia cutis  - prn atarax    # 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- chronic within basal ganglia noted on CT brain    FEN: Diet Regular; Regular House  PPx:  Pharmacologic VTE Prophylaxis contraindicated due to thrombocytopenia  / sequential compression device   Code: Level 3 - DNAR and DNI, as discussed at bedside on admission  Dispo: HLOC    I have discussed the plan of care with: patient    Anticipated Length of Stay:  Patient will be admitted on an Inpatient basis with an anticipated length of stay of greater than 2 midnights  Justification for Hospital Stay: acute leukemia, MARIA VICTORIA    Total Time for Visit, including Counseling / Coordination of Care: 1 hour  Greater than 50% of this total time spent on direct patient counseling and coordination of care      Allscripts / Epic Records Reviewed: Yes       Signature:  Minerva Dong MD     Electronic Signature

## 2018-02-21 NOTE — CASE MANAGEMENT
Initial Clinical Review    Admission: Date/Time/Statement: 2/20/18 @ 2034     Orders Placed This Encounter   Procedures    Inpatient Admission     Standing Status:   Standing     Number of Occurrences:   1     Order Specific Question:   Admitting Physician     Answer:   Quoc Reese [[id number]]     Order Specific Question:   Level of Care     Answer:   Med Surg [[id number]]     Order Specific Question:   Estimated length of stay     Answer:   More than 2 Midnights     Order Specific Question:   Certification     Answer:   I certify that inpatient services are medically necessary for this patient for a duration of greater than two midnights  See H&P and MD Progress Notes for additional information about the patient's course of treatment  Chief Complaint:  Transfer for chemotherapy      History of Illness: Morena Aleman is a 80 y o  male with a medical history significant for squamous cell ca, melanoma who is transferred from [facility] for palliative chemo  He presented at [address] on 2/19 with lethargy, fevers, and cough along with a diffuse rash on his back  He was found to have a Hb of 7 4, MCV of 93, platelet count 80686, WBC 37197, 0 neut identified, 8% lymph, 70% mono, 14% blasts, 8% atypical lymphocytes  He was seen by heme/onc and he is suspected to have AML with poor prognosis  Plan to proceed with palliative chemo and IR bone biopsy  Course at [facility] also noted for MARIA VICTORIA  For anemia, he was transfused 1U prbc prior to transfer   He was also started on CTX/azithro for possible RUL CAP, legionella and strep pneumo pending          Vital Signs:   Vitals   Temperature Pulse Respirations Blood Pressure SpO2   02/20/18 2035 02/20/18 2035 02/20/18 2035 02/20/18 2035 02/20/18 2035   99 9 °F (37 7 °C) 84 18 130/78 95 % RA sat      Temp Source Heart Rate Source Patient Position - Orthostatic VS BP Location FiO2 (%)   02/20/18 2035 02/21/18 0902 -- -- --   Oral Monitor         Pain Score       02/20/18 2035       No Pain Wt Readings from Last 1 Encounters:   02/19/18 76 2 kg (168 lb)       Abnormal Labs/Diagnostic Test Results:       Ref Range & Units 2/21/18 0522 2/20/18 2240 2/20/18 0606 2/19/18 1642   WBC 4 31 - 10 16 Thousand/uL 91 50    79  46CM   97 80CM     RBC 3 88 - 5 62 Million/uL 2 17    2 12   2 30     Hemoglobin 12 0 - 17 0 g/dL 7 1   7 2   6 9CM   7 4     Hematocrit 36 5 - 49 3 % 19 8   20 8   19 7   21 3     MCV 82 - 98 fL 91   93  93    MCH 26 8 - 34 3 pg 32 7   32 5  32 2    MCHC 31 4 - 37 4 g/dL 35 9   35 0  34 7    RDW 11 6 - 15 1 % 14 3   14 0  14 1    MPV 8 9 - 12 7 fL 9 6   10 3  10 3    Platelets 120 - 878 Thousands/uL 26    23CM   26CM              Ref Range & Units 2/21/18 0522 2/20/18 0606 2/19/18 1642   Sodium 136 - 145 mmol/L 137  137  134     Potassium 3 5 - 5 3 mmol/L 3 6  3 8  4 1    Chloride 100 - 108 mmol/L 108  106  100    CO2 21 - 32 mmol/L 20   20   22    Anion Gap 4 - 13 mmol/L 9  11  12    BUN 5 - 25 mg/dL 12  15  18    Creatinine 0 60 - 1 30 mg/dL 1 05  1 31CM   1 33CM     Glucose 65 - 140 mg/dL 89  116CM  125CM    Calcium 8 3 - 10 1 mg/dL 7 0   7 3   8 2     eGFR ml/min/1 73sq m 64  49  48            Ferritin 1008  TIBC 148    Ct Brain 2/19 - no acute  CXR  - 2/20 - RUL infiltrate       Past Medical/Surgical History:  No Additional Past Medical History       Admitting Diagnosis: AML (acute myeloblastic leukemia) (HCC) [C92 00]    Age/Sex: 80 y o  male    Assessment/Plan:  80 y o  male with a medical history significant for squamous cell ca, melanoma who initially presented to [facility] with lethargy, fevers, and cough along with a diffuse rash on his back, found with labs c/f AML  Transferred to [facility] for palliative chemo       # Suspected AML  - leukocytosis with predominantly monocytosis   C/f AML  - IR bone marrow bx ordered  - oncology consult  - awaiting palliative chemo and would need to monitor for tumor lysis syndrome  - note high risk of infection  - neutropenic precautions     # RUL infiltrate  - infectious vs leukemic infiltrate  - will cont on CTX/azithro for now to complete course for CAP     # MARIA VICTORIA  - Cr 1 33 from 0 98 in Apri 2016  - s/p 1L NS and continuous IVFs  - encourage PO intake  - avoid nephrotoxins  - f/u Cr in am     # Anemia  - Hb 6 9 and s/p 1U PRBC (note blood products to be leukoreduced)  - f/u post transfusion Hb is 7 2  - will need to recheck labs in am for trend  - transfuse for Hb < 7     # Thrombocytopenia   - no s/o bleeding, monitor  - transfuse for platelets < 10     # SIRS - f/u BCx from ED     # Rash  - c/f leukemia cutis  - prn atarax     # 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- chronic within basal ganglia noted on CT brain       Admission Orders:  Scheduled Meds:   Current Facility-Administered Medications:  azithromycin 500 mg Intravenous Q24H Last Rate: 500 mg (02/21/18 0126)   cefTRIAXone 1,000 mg Intravenous Q24H    guaiFENesin 600 mg Oral Q12H [facility]      Continuous Infusions:    PRN Meds:   acetaminophen    benzonatate    hydrOXYzine HCL    Nursing orders- VS q 3 - SCD's to le's- Neutropenic precautions - Up and OOB as tolerated with assistance - Diet NPO     Oncology Consult - pending

## 2018-02-21 NOTE — PLAN OF CARE
Problem: Nutrition/Hydration-ADULT  Goal: Nutrient/Hydration intake appropriate for improving, restoring or maintaining nutritional needs  Monitor and assess patient's nutrition/hydration status for malnutrition (ex- brittle hair, bruises, dry skin, pale skin and conjunctiva, muscle wasting, smooth red tongue, and disorientation)  Collaborate with interdisciplinary team and initiate plan and interventions as ordered  Monitor patient's weight and dietary intake as ordered or per policy  Utilize nutrition screening tool and intervene per policy  Determine patient's food preferences and provide high-protein, high-caloric foods as appropriate       INTERVENTIONS:  - Monitor oral intake, urinary output, labs, and treatment plans  - Assess nutrition and hydration status and recommend course of action  - Evaluate amount of meals eaten  - Assist patient with eating if necessary   - Allow adequate time for meals  - Recommend/ encourage appropriate diets, oral nutritional supplements, and vitamin/mineral supplements  - Order, calculate, and assess calorie counts as needed  - Assess need for intravenous fluids  - Provide specific nutrition/hydration education as appropriate  - Include patient/family/caregiver in decisions related to nutrition    Outcome: Progressing

## 2018-02-21 NOTE — CONSULTS
Consultation - Palliative and Supportive Care   Bettie Roth 80 y o  male [id number]    Assessment:  Patient Active Problem List   Diagnosis    Allergic rhinitis    Atopic dermatitis    Balance problems    Enlarged prostate without lower urinary tract symptoms (luts)    Erectile dysfunction    Folliculitis    Hematuria, gross    Hyperglycemia    Melanoma in situ ([facility] )    Peripheral arterial disease (HCC)    Peripheral neuropathy    Peripheral vascular disease ([facility] )    Lethargic    Myalgia    Fever    Monocytosis    Generalized skin papules    SIRS (systemic inflammatory response syndrome) (HCC)    Lethargy    Anemia    Thrombocytopenia (HCC)    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([facility] )    MARIA VICTORIA (acute kidney injury) ([facility] )    CAP (community acquired pneumonia)    AML (acute myeloblastic leukemia) ([place] [id number] )           Plan:  1  Symptom management - No acute symptom mgmt needs at this time  Will monitor     -     2  Goals - Treatment oriented  Initial goals of care conversation today  Will consult pastoral care as patient relies on spiritual support  Will have LSW meet with patient as well  Will start advanced care planning as soon as patient is tolerated  Code Status: LEvel 3   Decisional apparatus:  Patient is competent on my exam today  If competence is lost, patient's substitute decision maker would default to spouse by [place]  Advance Directive / Living Will / POLST:  None on file         We appreciate the invitation to be involved in this patient's care  We will follow  Please do not hesitate to reach our on call provider through our clinic answering service at  should you have acute symptom control concerns        IDENTIFICATION:       Inpatient consult to Palliative Care     Performed by  Felipe Nava     Authorized by Prashanth Jenkins            Physician Requesting Consult: Clara Kerr MD  Reason for Consult / Principal Problem: goals of care    Hx and PE limited by: none    HISTORY OF PRESENT ILLNESS:       Kole Bruner is a 80 y o  male with history of squamous cell carcinoma who presented to the emergency department with lethargy fevers cough and rash  Patient had lost 13 lb over the past month  Lab results concerning for AML  Patient has been transferred over to [facility] after initial discussion with Oncology for workup and initiation of therapeutic regimen  Initial discussion with patient reveals that he has a strong Bahai karlos based  He is trying to take things sedated time  He is overall realistic about his prognosis and indicates that he knows there is likely no care, but would like to gain as much quality time as possible  We discussed symptoms and patient is currently asymptomatic  He denies any pain, he denies any nausea, he denies any constipation  Review of Systems   Constitution: Positive for decreased appetite and weight loss  HENT: Negative  Eyes: Negative  Cardiovascular: Negative  Respiratory: Negative  Endocrine: Negative  Hematologic/Lymphatic: Negative  Gastrointestinal: Negative  Genitourinary: Negative  Neurological: Negative  All other systems reviewed and are negative  History reviewed  No pertinent past medical history  Past Surgical History:   Procedure Laterality Date    COLONOSCOPY N/A 2012    COLONOSCOPY  01/2013    No repeat is recommended  Findings were diverticulosis and mild hemorrhoids   CYSTOSCOPY      onset: 05/06/2016; diagnostic    HERNIA REPAIR  12/09/2003    [place] ENDOVENOUS LASER, 1ST VEIN Left 1/15/2016    Procedure: ENDOVASCULAR LASER THERAPY (EVLT) with multiple stab phlebectomies-between 10-20;   Surgeon: Mayi Abraham MD;  [facility];  Service: Vascular    [place] PHLEB VEINS - EXTREM - TO 20 Left 1/15/2016    Procedure: LEG ENDOVENOUS LASER OBLITERATION GREATER SAPHENOUS VEIN WITH MULTIPLE VEIN LIGATIONS ;  Surgeon: Mayi Abraham MD;  [facility];  Service: Vascular    TONSILLECTOMY N/A      Social History     Social History    Marital status: /Civil Union     Spouse name: N/A    Number of children: N/A    Years of education: N/A     Occupational History    Retired      Social History Main Topics    Smoking status: Never Smoker    Smokeless tobacco: Never Used    Alcohol use Yes      Comment: 4 drinks per month    Drug use: Unknown    Sexual activity: Not on file     Other Topics Concern    Not on file     Social History Narrative    No secondhand smoke exposure     Family History   Problem Relation Age of Onset    Coronary artery disease Mother     Stroke Mother     Coronary artery disease Father     Heart disease Father        MEDICATIONS / ALLERGIES:    all current active meds have been reviewed and current meds:   Current Facility-Administered Medications   Medication Dose Route Frequency    acetaminophen (TYLENOL) tablet 650 mg  650 mg Oral Q6H PRN    azithromycin (ZITHROMAX) 500 mg in sodium chloride 0 9% 250mL IVPB 500 mg  500 mg Intravenous Q24H    benzonatate (TESSALON PERLES) capsule 100 mg  100 mg Oral TID PRN    cefTRIAXone (ROCEPHIN) IVPB (premix) 1,000 mg  1,000 mg Intravenous Q24H    guaiFENesin (MUCINEX) 12 hr tablet 600 mg  600 mg Oral Q12H JAMES    hydrOXYzine HCL (ATARAX) tablet 25 mg  25 mg Oral Q6H PRN       Allergies   Allergen Reactions    Iodinated Diagnostic Agents      IVP dye       OBJECTIVE:    Physical Exam  Physical Exam   Constitutional: He appears cachectic  He is cooperative  No distress  HENT:   Right Ear: External ear normal    Left Ear: External ear normal    Nose: Nose normal    Modest temporal wasting   Eyes: Lids are normal  Right eye exhibits no discharge  Left eye exhibits no discharge  Neck: No JVD present  Cardiovascular: Normal heart sounds  Pulmonary/Chest: Effort normal  No respiratory distress  Abdominal: He exhibits no distension     Musculoskeletal:   No edema Neurological: He is alert  Skin: Skin is warm and dry  He is not diaphoretic  No pallor  Psychiatric: He has a normal mood and affect  Nursing note and vitals reviewed  Lab Results:   I have personally reviewed pertinent labs  , CBC:   Lab Results   Component Value Date    WBC 91 50 (HH) 02/21/2018    HGB 7 1 (L) 02/21/2018    HCT 19 8 (L) 02/21/2018    MCV 91 02/21/2018    PLT 26 (LL) 02/21/2018    MCH 32 7 02/21/2018    MCHC 35 9 02/21/2018    RDW 14 3 02/21/2018    MPV 9 6 02/21/2018    NRBC 0 02/21/2018   , CMP:   Lab Results   Component Value Date     02/21/2018    K 3 6 02/21/2018     02/21/2018    CO2 20 (L) 02/21/2018    ANIONGAP 9 02/21/2018    BUN 12 02/21/2018    CREATININE 1 05 02/21/2018    GLUCOSE 89 02/21/2018    CALCIUM 7 0 (L) 02/21/2018    EGFR 64 02/21/2018     Imaging Studies:  CT head reviewed no acute intracranial abnormalities, chest x-ray showing right upper lobe infiltrate  EKG, Pathology, and Other Studies:  Pathology pending    Counseling / Coordination of Care  Total floor / unit time spent today 35+ minutes  Greater than 50% of total time was spent with the patient and / or family counseling and / or coordination of care   A description of the counseling / coordination of care:  Symptom assessment, initial goals of care discussion

## 2018-02-21 NOTE — PLAN OF CARE

## 2018-02-21 NOTE — TELEPHONE ENCOUNTER
Spoke with wife in person after I called to let her know that I called    Reviewed her 's diagnosis, treatment options, etc

## 2018-02-21 NOTE — SOCIAL WORK
CM met w pt today & explained M role  Pt lives w wife in ranch home w no janee  Was [employer], retired & ddrives  No dme  No  h/o vna or rhb  Denies any MH, D&A tx  Uses [facility] [place]  PCP Dr Pako Silva  Unsure if has POA  Emergency contact, wife Luann Bonilla [phone]  Cm to follow for discharge needs  CM reviewed d/c planning process including the following: identifying help at home, patient preference for d/c planning needs, Discharge Lounge, [facility] Meds to Bed program, availability of treatment team to discuss questions or concerns patient and/or family may have regarding understanding medications and recognizing signs and symptoms once discharged  CM also encouraged patient to follow up with all recommended appointments after discharge  Patient advised of importance for patient and family to participate in managing patients medical well being

## 2018-02-21 NOTE — CONSULTS
Consultation - Dermatology    Stephanie Burnette 80 y o  male MRN: [id number]    Unit/Bed#: [facility] 630-01 Encounter: [id number]      Assessment/Plan     Assessment:  1-Sweets syndrome vs leukemic infiltrate back  2- Postinflammatory hyperpigmentation legs where lesions have resolved  Plan:  1- 3 mm punch biopsy taken right lower back  Specimen to pathology  Triamcinolone 0 1% cream BID    History of Present Illness     HPI: Stephanie Burnette is a 80y o  year old male who presents with 6 week history of rash on back  Treated as outpatient with prednisone an antibiotics  Some improvement  Now hospitalized undergoing workup for AML  Lesions are not particularly symptomatic  Historical Information   History reviewed  No pertinent past medical history  Past Surgical History:   Procedure Laterality Date    COLONOSCOPY N/A 2012    COLONOSCOPY  01/2013    No repeat is recommended  Findings were diverticulosis and mild hemorrhoids   CYSTOSCOPY      onset: 05/06/2016; diagnostic    HERNIA REPAIR  12/09/2003    NV ENDOVENOUS LASER, 1ST VEIN Left 1/15/2016    Procedure: ENDOVASCULAR LASER THERAPY (EVLT) with multiple stab phlebectomies-between 10-20;   Surgeon: Grace Hardin MD;  [facility]: [facility];  Service: Vascular    NV PHLEB VEINS - EXTREM - TO 20 Left 1/15/2016    Procedure: LEG ENDOVENOUS LASER OBLITERATION GREATER SAPHENOUS VEIN WITH MULTIPLE VEIN LIGATIONS ;  Surgeon: Grace Hardin MD;  [facility];  Service: Vascular    TONSILLECTOMY N/A      Social History   History   Alcohol Use    Yes     Comment: 4 drinks per month     History   Drug use: Unknown     History   Smoking Status    Never Smoker   Smokeless Tobacco    Never Used     Family History:   Family History   Problem Relation Age of Onset    Coronary artery disease Mother     Stroke Mother     Coronary artery disease Father     Heart disease Father        Meds/Allergies   current meds:   Current Facility-Administered Medications Medication Dose Route Frequency    acetaminophen (TYLENOL) tablet 650 mg  650 mg Oral Q6H PRN    azithromycin (ZITHROMAX) 500 mg in sodium chloride 0 9% 250mL IVPB 500 mg  500 mg Intravenous Q24H    benzonatate (TESSALON PERLES) capsule 100 mg  100 mg Oral TID PRN    cefTRIAXone (ROCEPHIN) IVPB (premix) 1,000 mg  1,000 mg Intravenous Q24H    guaiFENesin (MUCINEX) 12 hr tablet 600 mg  600 mg Oral Q12H JAMES    hydrOXYzine HCL (ATARAX) tablet 25 mg  25 mg Oral Q6H PRN    and PTA meds:    Prescriptions Prior to Admission   Medication    Ascorbic Acid (VITAMIN C) 100 MG tablet    Multiple Vitamins-Minerals (CENTRUM SILVER 50+MEN PO)    Omega-3 Fatty Acids (FISH OIL) 1,000 mg     Allergies   Allergen Reactions    Iodinated Diagnostic Agents      IVP dye       Objective     Lab Results: I have personally reviewed pertinent reports  Physical Exam: Skin: Location:    erythema - back, abdomen with violacios palques some vessicular mostly back   Few eschars scalp ans extremities      Chanel Ash  2/21/2018  6:37 PM

## 2018-02-22 NOTE — PROGRESS NOTES
Progress Note - Riccardo Res 7/24/1931, 80 y o  male MRN: [id number]    Unit/Bed#: [facility] 630-01 Encounter: [id number]    Primary Care Provider: Nadeem Quinn MD   Date and time admitted to hospital: 2/20/2018  8:34 PM        Skin abnormality   Assessment & Plan    · Of the back, papular rash, dark red in color, palpable, not blanchable  · 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  · Status post biopsy with dermatologist, pending report  · As per Dermatology apply triamcinolone cream        CAP (community acquired pneumonia)   Assessment & Plan    · Unclear if patient was septic upon admission in a different campus, SIRS criteria at this time were met and also patient had acute kidney injury unclear at this time if related to severe sepsis  · Patient continues on antibiotics, day 4 of 5 of azithromycin, day 4 of 7 of Rocephin  · Continue with supportive care  · Patient remains afebrile, unreliable monitoring of WBC in settings of suspected acute myeloid leukemia        MARIA VICTORIA (acute kidney injury) ([facility] )   Assessment & Plan    · Resolved, continue to monitor BMP        Thrombocytopenia (HCC)   Assessment & Plan    · 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  · Follow-up CBC in the morning, transfuse for platelets less than 20, platelets today 25  · Repeat cbc as above        Anemia   Assessment & Plan    · 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  · Transfuse for hemoglobin less than 7 or for symptoms as per Oncology recommendation  · The hemoglobin 7 1, cbc repeat at 18:00 is pending        * AML (acute myeloblastic leukemia) ([facility] )   Assessment & Plan    · Patient presented to other campus for evaluation of pneumonia and elevated white count, his WBC today is 91 5  · As he had abnormal WBC was transferred to our campus for bone marrow biopsy and possible chemotherapy  · Discussed with Oncology, if proven not to be non M3 variant of acute leukemia he will be started on Hydrea and after few days on chemotherapy  · Follow-up bone marrow biopsy  · Follow-up cytometry  · Palliative Care has been involved for advanced plan of care for this patient whose prognosis regardless remains very poor, appreciated consultant note  · Prognosis according to Oncology at this time appears poor          VTE Pharmacologic Prophylaxis:  No  Pharmacologic: Severe thrombocytopenia  Mechanical VTE Prophylaxis in Place: Yes     Patient Centered Rounds: I have performed bedside rounds with nursing staff today      Discussions with Specialists or Other Care Team Provider:  Oncology     Education and Discussions with Family / Patient:  Patient, call attempted to his wife, left a message     Time Spent for Care: 30 minutes  More than 50% of total time spent on counseling and coordination of care as described above      Current Length of Stay: 2 day(s)     Current Patient Status: Inpatient   Certification Statement: The patient will continue to require additional inpatient hospital stay due to Refer to above     Discharge Plan: To be determined     Code Status: Level 3 - DNAR and DNI    Subjective:   Patient states that he feels well, he has no complaints PE    Objective:     Vitals:   Temp (24hrs), Av 3 °F (36 8 °C), Min:97 7 °F (36 5 °C), Max:98 8 °F (37 1 °C)    HR:  [76-86] 80  Resp:  [16-20] 16  BP: (107-128)/(50-74) 128/74  SpO2:  [94 %-96 %] 94 %  Body mass index is 23 19 kg/m²  Input and Output Summary (last 24 hours): Intake/Output Summary (Last 24 hours) at 18 1826  Last data filed at 18 1601   Gross per 24 hour   Intake              810 ml   Output             1700 ml   Net             -890 ml       Physical Exam:     Physical Exam   Constitutional: He is oriented to person, place, and time  He appears well-developed  Cardiovascular: Normal rate, regular rhythm and normal heart sounds  Exam reveals no friction rub  No murmur heard  Pulmonary/Chest: Effort normal  No respiratory distress  He has no wheezes  He has no rales  Abdominal: Soft  He exhibits no distension  There is no tenderness  There is no rebound  Musculoskeletal: He exhibits no edema  Neurological: He is alert and oriented to person, place, and time  He exhibits normal muscle tone  Skin: Skin is warm  Psychiatric: He has a normal mood and affect  Additional Data:     Labs:      Results from last 7 days  Lab Units 02/22/18  0518   WBC Thousand/uL 76 61*   HEMOGLOBIN g/dL 7 1*   HEMATOCRIT % 19 7*   PLATELETS Thousands/uL 25*   LYMPHO PCT % 12*   MONO PCT MAN % 63*   EOSINO PCT MANUAL % 0       Results from last 7 days  Lab Units 02/22/18  0518  02/20/18  0606   SODIUM mmol/L 139  < > 137   POTASSIUM mmol/L 4 0  < > 3 8   CHLORIDE mmol/L 109*  < > 106   CO2 mmol/L 21  < > 20*   BUN mg/dL 14  < > 15   CREATININE mg/dL 0 96  < > 1 31*   CALCIUM mg/dL 7 2*  < > 7 3*   TOTAL PROTEIN g/dL  --   --  6 3*   BILIRUBIN TOTAL mg/dL  --   --  0 66   ALK PHOS U/L  --   --  66   ALT U/L  --   --  24   AST U/L  --   --  37   GLUCOSE RANDOM mg/dL 95  < > 116   < > = values in this interval not displayed  Results from last 7 days  Lab Units 02/20/18  0607   INR  1 39*       * I Have Reviewed All Lab Data Listed Above  * Additional Pertinent Lab Tests Reviewed: All Labs Within Last 24 Hours Reviewed    Imaging:    Imaging Reports Reviewed Today Include: none  Imaging Personally Reviewed by Myself Includes:  none    Recent Cultures (last 7 days):       Results from last 7 days  Lab Units 02/21/18  0906 02/20/18  1457 02/19/18  1708 02/19/18  1642   BLOOD CULTURE   --   --   --  No Growth at 48 hrs  No Growth at 48 hrs     SPUTUM CULTURE  3+ Growth of   --   --   --    GRAM STAIN RESULT  1+ Epithelial cells per low power field  Rare Polys  3+ Gram negative rods  1+ Gram positive cocci in pairs  Rare Gram positive rods  --   --   --    INFLUENZA A PCR   --   --  None Detected  --    INFLUENZA B PCR   --   --  None Detected  --    RSV PCR   --   --  None Detected  -- LEGIONELLA URINARY ANTIGEN   --  Negative  --   --        Last 24 Hours Medication List:     Current Facility-Administered Medications:  acetaminophen 650 mg Oral Q6H PRN Kaylin Yen MD    azithromycin 500 mg Intravenous Q24H Kaylin Yen MD Last Rate: 500 mg (02/22/18 0130)   benzonatate 100 mg Oral TID PRN Kaylin Yen MD    cefTRIAXone 1,000 mg Intravenous Q24H Kaylin Yen MD Last Rate: Stopped (02/22/18 1601)   guaiFENesin 600 mg Oral Q12H [address] [place] [address] MD Avril    hydrOXYzine HCL 25 mg Oral Q6H PRN Kaylin Yen MD    triamcinolone  Topical BID [facility]  Today, Patient Was Seen By: Joselyn Marin MD    ** Please Note: Dragon 360 Dictation voice to text software may have been used in the creation of this document   **

## 2018-02-22 NOTE — SOCIAL WORK
Met with patient to introduce Palliative Care and provide him and family emotional support  No family is present and so patient explains the family's needs to LSW  Patient states his wife is struggling with this new diagnosis and is normally a nervous person so he is concerned that this will be a lot for her to handle  Per patient, one of his children is a nurse and would like as much clinical information as possible to be reviewed with her (patient is agreeable and supportive of this request)  Patient and wife have a total of 3 adult children who are supportive of their parents  Patient and wife have been  62 years and per patient, have a wonderful marriage  Patient knows of only 1 person before who had Lukemia and this man  as a young man (approximately 30 years ago)  Patient is hopeful that treatments have improved over those many years  Patient says that he is nervous about the "changes to his life that will need to occur "  But when we explored further patient isn't able to specifically state what needs to change other than not being in the sun  Patient and wife enjoy traveling and going to [place], [place]  Patient and wife have a very strong [employer] belief  His  came to visit him today  Patient thinks that the [place] community and children will be their biggest supporters  Patient thinks that him and wife may have at 1 time completed advanced directives but isn't certain  He will ask her this evening when she visits  If not, LSW will assist him in completing  LSW offered to discuss further with family or after discharge at an office visit

## 2018-02-22 NOTE — ASSESSMENT & PLAN NOTE
· 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  · Follow-up CBC in the morning, transfuse for platelets less than 20, platelets today 25  · Repeat cbc as above

## 2018-02-22 NOTE — PROGRESS NOTES
02/22/18 [address] [place], [place] [address]therese    Spiritual Beliefs/Perceptions   Concept of God Accepting   Relationship with God Close   Spiritual Strengths Kristal; prayer   Support Systems Spouse/significant other;Family members   Coping Responses   Patient Coping Accepting   Family Coping Accepting; Fearful   Plan of Care   Comments  and pt talked about his illness, family, hope and kristal  We also prayed together  Assessment Completed by:  Other (Comment)  (Pastoral Care follow-up)

## 2018-02-22 NOTE — PROGRESS NOTES
Progress note - Palliative and Supportive Care   Miguelito Spencer 80 y o  male [id number]    Assessment:     Patient Active Problem List   Diagnosis    Allergic rhinitis    Atopic dermatitis    Balance problems    Enlarged prostate without lower urinary tract symptoms (luts)    Erectile dysfunction    Folliculitis    Hematuria, gross    Hyperglycemia    Melanoma in situ ([facility] )    Peripheral arterial disease (HCC)    Peripheral neuropathy    Peripheral vascular disease ([facility] )    Lethargic    Myalgia    Fever    Monocytosis    Generalized skin papules    SIRS (systemic inflammatory response syndrome) (HCC)    Lethargy    Anemia    Thrombocytopenia (HCC)    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([facility] )    MARIA VICTORIA (acute kidney injury) ([facility] )    CAP (community acquired pneumonia)    AML (acute myeloblastic leukemia) ([place] [id number] )    Skin abnormality         Plan:  1  Symptom management -    Probable Leukemic Rash on back - triamcinolone per dermatology  2  Goals - patient is focused on biopsy results and possible therapy options  We will continue to provide support and work towards advanced care planning    -     Code Status: DNR/DNI - Level 3   Decisional apparatus:  Patient is competent on my exam today  If competence is lost, patient's substitute decision maker would default to spouse [place]  Advance Directive / Living Will / POLST:  Working on completing     Interval history:       Patient evaluated at bedside  Had both BM biopsy and skin biopsy yesterday  Continues to be optimistic  States he is having no pain and is glad his rash is being treated  Eager to meet with LSW to discuss advanced care planning and appreciates the idea of chaplaincy       MEDICATIONS / ALLERGIES:    all current active meds have been reviewed and current meds:   Current Facility-Administered Medications   Medication Dose Route Frequency    acetaminophen (TYLENOL) tablet 650 mg  650 mg Oral Q6H PRN    azithromycin (ZITHROMAX) 500 mg in sodium chloride 0 9% 250mL IVPB 500 mg  500 mg Intravenous Q24H    benzonatate (TESSALON PERLES) capsule 100 mg  100 mg Oral TID PRN    cefTRIAXone (ROCEPHIN) IVPB (premix) 1,000 mg  1,000 mg Intravenous Q24H    guaiFENesin (MUCINEX) 12 hr tablet 600 mg  600 mg Oral Q12H JAMES    hydrOXYzine HCL (ATARAX) tablet 25 mg  25 mg Oral Q6H PRN    triamcinolone (KENALOG) 0 1 % cream   Topical BID       Allergies   Allergen Reactions    Iodinated Diagnostic Agents      IVP dye       OBJECTIVE:    Physical Exam  Physical Exam   Constitutional: He is cooperative  No distress  HENT:   Head: Atraumatic  Right Ear: External ear normal    Left Ear: External ear normal    Nose: Nose normal    Eyes: Lids are normal  Right eye exhibits no exudate  Left eye exhibits no exudate  Neck: No JVD present  Pulmonary/Chest: Effort normal  No respiratory distress  Abdominal: Normal appearance  He exhibits no distension  Musculoskeletal: He exhibits no edema  Neurological: He is alert  Skin: Skin is warm and dry  Rash noted  He is not diaphoretic  Papular erythematous rash on back with crusting  Psychiatric: He has a normal mood and affect  Nursing note and vitals reviewed  Lab Results:   I have personally reviewed pertinent labs  , CBC:   Lab Results   Component Value Date    WBC 76 61 (HH) 02/22/2018    HGB 7 1 (L) 02/22/2018    HCT 19 7 (L) 02/22/2018    MCV 91 02/22/2018    PLT 25 (LL) 02/22/2018    MCH 32 9 02/22/2018    MCHC 36 0 02/22/2018    RDW 14 1 02/22/2018    MPV 9 1 02/22/2018    NRBC 0 02/22/2018   , CMP:   Lab Results   Component Value Date     02/22/2018    K 4 0 02/22/2018     (H) 02/22/2018    CO2 21 02/22/2018    ANIONGAP 9 02/22/2018    BUN 14 02/22/2018    CREATININE 0 96 02/22/2018    GLUCOSE 95 02/22/2018    CALCIUM 7 2 (L) 02/22/2018    EGFR 71 02/22/2018     Imaging Studies: none new  EKG, Pathology, and Other Studies: biopsy pending    Counseling / Coordination of Care  Total floor / unit time spent today 15+ minutes  Greater than 50% of total time was spent with the patient and / or family counseling and / or coordination of care  A description of the counseling / coordination of care: symptom assessment, psychosocial support

## 2018-02-22 NOTE — ASSESSMENT & PLAN NOTE
· Unclear if patient was septic upon admission in a different campus, SIRS criteria at this time were met and also patient had acute kidney injury unclear at this time if related to severe sepsis  · Patient continues on antibiotics, day 4 of 5 of azithromycin, day 4 of 7 of Rocephin  · Continue with supportive care  · Patient remains afebrile, unreliable monitoring of WBC in settings of suspected acute myeloid leukemia

## 2018-02-22 NOTE — ASSESSMENT & PLAN NOTE
· Of the back, papular rash, dark red in color, palpable, not blanchable  · 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  · Status post biopsy with dermatologist, pending report  · As per Dermatology apply triamcinolone cream

## 2018-02-22 NOTE — TELEPHONE ENCOUNTER
Spoke with pathologist in regards to patient's pathology and flow cytometry report  Patient has AML  Pathology asked to fax over report  Dr Sofia [place] notified

## 2018-02-22 NOTE — ASSESSMENT & PLAN NOTE
· 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  · Transfuse for hemoglobin less than 7 or for symptoms as per Oncology recommendation  · The hemoglobin 7 1, cbc repeat at 18:00 is pending

## 2018-02-22 NOTE — PROGRESS NOTES
Oncology Progress Note  Zack Roca 80 y o  male MRN: [id number]  Unit/Bed#: [facility] 630-01 Encounter: [id number]      /56   Pulse 79   Temp 98 8 °F (37 1 °C) (Oral)   Resp 16   Ht 5' 10" (1 778 m)   Wt 73 3 kg (161 lb 9 6 oz) Comment: Standing   SpO2 95%   BMI 23 19 kg/m²     Subjective:  No new complaints    Objective:    General Appearance:    Alert, oriented        Eyes:    PERRL   Ears:    Normal external ear canals, both ears   Nose:   Nares normal, septum midline   Throat:   Mucosa moist  Pharynx without injection  Neck:   Supple       Lungs:     Clear to auscultation bilaterally   Chest Wall:    No tenderness or deformity    Heart:    Regular rate and rhythm       Abdomen:     Soft, non-tender, bowel sounds +, no organomegaly           Extremities:   Extremities no cyanosis or edema       Skin:   no rash or icterus      Lymph nodes:   Cervical, supraclavicular, and axillary nodes normal   Neurologic:   CNII-XII intact, normal strength, sensation and reflexes     throughout        Recent Results (from the past 48 hour(s))   Legionella antigen, urine    Collection Time: 02/20/18  2:57 PM   Result Value Ref Range    Legionella Urinary Antigen Negative Negative   Strep Pneumoniae, Urine    Collection Time: 02/20/18  2:57 PM   Result Value Ref Range    Strep pneumoniae antigen, urine Negative Negative   Hemoglobin and hematocrit, blood    Collection Time: 02/20/18 10:40 PM   Result Value Ref Range    Hemoglobin 7 2 (L) 12 0 - 17 0 g/dL    Hematocrit 20 8 (L) 36 5 - 49 3 %   Fibrin split products    Collection Time: 02/20/18 10:40 PM   Result Value Ref Range    FDP <10 <10, >40 <80   Fibrinogen    Collection Time: 02/20/18 10:40 PM   Result Value Ref Range    Fibrinogen 487 227 - 495 mg/dL   Hemolysis Smear    Collection Time: 02/20/18 10:40 PM   Result Value Ref Range    Hemolysis Smear No Schistocytes or Helmet Cells noted    Basic metabolic panel    Collection Time: 02/21/18  5:22 AM   Result Value Ref Range    Sodium 137 136 - 145 mmol/L    Potassium 3 6 3 5 - 5 3 mmol/L    Chloride 108 100 - 108 mmol/L    CO2 20 (L) 21 - 32 mmol/L    Anion Gap 9 4 - 13 mmol/L    BUN 12 5 - 25 mg/dL    Creatinine 1 05 0 60 - 1 30 mg/dL    Glucose 89 65 - 140 mg/dL    Calcium 7 0 (L) 8 3 - 10 1 mg/dL    eGFR 64 ml/min/1 73sq m   CBC and differential    Collection Time: 02/21/18  5:22 AM   Result Value Ref Range    WBC 91 50 (HH) 4 31 - 10 16 Thousand/uL    RBC 2 17 (L) 3 88 - 5 62 Million/uL    Hemoglobin 7 1 (L) 12 0 - 17 0 g/dL    Hematocrit 19 8 (L) 36 5 - 49 3 %    MCV 91 82 - 98 fL    MCH 32 7 26 8 - 34 3 pg    MCHC 35 9 31 4 - 37 4 g/dL    RDW 14 3 11 6 - 15 1 %    MPV 9 6 8 9 - 12 7 fL    Platelets 26 (LL) 743 - 390 Thousands/uL    nRBC 0 /100 WBCs   Hemoglobin A1c    Collection Time: 02/21/18  5:22 AM   Result Value Ref Range    Hemoglobin A1C 7 6 (H) 4 2 - 6 3 %     mg/dl   Lipid Panel with Direct LDL reflex    Collection Time: 02/21/18  5:22 AM   Result Value Ref Range    Cholesterol 61 50 - 200 mg/dL    Triglycerides 77 <=150 mg/dL    HDL, Direct 24 (L) 40 - 60 mg/dL    LDL Calculated 22 0 - 100 mg/dL   Phosphorus    Collection Time: 02/21/18  5:22 AM   Result Value Ref Range    Phosphorus 2 4 2 3 - 4 1 mg/dL   Iron Saturation %    Collection Time: 02/21/18  5:22 AM   Result Value Ref Range    Iron Saturation 77 %    TIBC 148 (L) 250 - 450 ug/dL    Iron 114 65 - 175 ug/dL   Ferritin    Collection Time: 02/21/18  5:22 AM   Result Value Ref Range    Ferritin 1,008 (H) 8 - 388 ng/mL   Manual Differential(PHLEBS Do Not Order)    Collection Time: 02/21/18  5:22 AM   Result Value Ref Range    Segmented % 1 (L) 43 - 75 %    Lymphocytes % 16 14 - 44 %    Monocytes % 57 (H) 4 - 12 %    Eosinophils % 0 0 - 6 %    Basophils % 0 0 - 1 %    Blasts % 3 (H) <=0 %    Atypical Lymphocytes % 23 (H) <=0 %    Absolute Neutrophils 0 92 (L) 1 85 - 7 62 Thousand/uL    Lymphocytes Absolute 14 64 (H) 0 60 - 4 47 Thousand/uL Monocytes Absolute 52 16 (H) 0 00 - 1 22 Thousand/uL    Eosinophils Absolute 0 00 0 00 - 0 40 Thousand/uL    Basophils Absolute 0 00 0 00 - 0 10 Thousand/uL    Total Counted      RBC Morphology Present     Poikilocytes Present     Platelet Estimate Decreased (A) Adequate   Prepare RBC:Special Requirements: Leukoreduced; Has consent been obtained? Yes; Where is the Surgery Scheduled?  Mirta DOMÍNGUEZ Units    Collection Time: 02/21/18  5:30 AM   Result Value Ref Range    Unit Product Code A4780S53     Unit Number [id number]     Unit ABO O     Unit [facility] POS     Crossmatch Compatible     Unit Dispense Status Presumed Trans    Sputum culture and Gram stain    Collection Time: 02/21/18  9:06 AM   Result Value Ref Range    Sputum Culture 3+ Growth of      Gram Stain Result 1+ Epithelial cells per low power field     Gram Stain Result Rare Polys     Gram Stain Result 3+ Gram negative rods     Gram Stain Result 1+ Gram positive cocci in pairs     Gram Stain Result Rare Gram positive rods    Hemoglobin and hematocrit, blood    Collection Time: 02/21/18  6:00 PM   Result Value Ref Range    Hemoglobin 7 5 (L) 12 0 - 17 0 g/dL    Hematocrit 21 2 (L) 36 5 - 49 3 %   CBC and differential    Collection Time: 02/22/18  5:18 AM   Result Value Ref Range    WBC 76 61 (HH) 4 31 - 10 16 Thousand/uL    RBC 2 16 (L) 3 88 - 5 62 Million/uL    Hemoglobin 7 1 (L) 12 0 - 17 0 g/dL    Hematocrit 19 7 (L) 36 5 - 49 3 %    MCV 91 82 - 98 fL    MCH 32 9 26 8 - 34 3 pg    MCHC 36 0 31 4 - 37 4 g/dL    RDW 14 1 11 6 - 15 1 %    MPV 9 1 8 9 - 12 7 fL    Platelets 25 (LL) 122 - 390 Thousands/uL    nRBC 0 /100 WBCs   Basic metabolic panel    Collection Time: 02/22/18  5:18 AM   Result Value Ref Range    Sodium 139 136 - 145 mmol/L    Potassium 4 0 3 5 - 5 3 mmol/L    Chloride 109 (H) 100 - 108 mmol/L    CO2 21 21 - 32 mmol/L    Anion Gap 9 4 - 13 mmol/L    BUN 14 5 - 25 mg/dL    Creatinine 0 96 0 60 - 1 30 mg/dL    Glucose 95 65 - 140 mg/dL Calcium 7 2 (L) 8 3 - 10 1 mg/dL    eGFR 71 ml/min/1 73sq m   Magnesium    Collection Time: 02/22/18  5:18 AM   Result Value Ref Range    Magnesium 2 2 1 6 - 2 6 mg/dL   Phosphorus    Collection Time: 02/22/18  5:18 AM   Result Value Ref Range    Phosphorus 2 5 2 3 - 4 1 mg/dL   Manual Differential(PHLEBS Do Not Order)    Collection Time: 02/22/18  5:18 AM   Result Value Ref Range    Segmented % 0 (L) 43 - 75 %    Lymphocytes % 12 (L) 14 - 44 %    Monocytes % 63 (H) 4 - 12 %    Eosinophils % 0 0 - 6 %    Basophils % 0 0 - 1 %    Blasts % 12 (H) <=0 %    Atypical Lymphocytes % 13 (H) <=0 %    Absolute Neutrophils 0 00 (L) 1 85 - 7 62 Thousand/uL    Lymphocytes Absolute 9 19 (H) 0 60 - 4 47 Thousand/uL    Monocytes Absolute 48 26 (H) 0 00 - 1 22 Thousand/uL    Eosinophils Absolute 0 00 0 00 - 0 40 Thousand/uL    Basophils Absolute 0 00 0 00 - 0 10 Thousand/uL    Total Counted 100     Platelet Estimate Decreased (A) Adequate         Xr Sinuses Routine 3+ Views    Result Date: 2/1/2018  Narrative: PARANASAL SINUSES INDICATION:  Congestion  COMPARISON:  None VIEWS:  5 IMAGES:  5 FINDINGS: No evidence of air-fluid levels  There is suggestion of mucosal thickening in the left maxillary sinus  No lytic or blastic lesions are identified  Impression: Suggestion of left maxillary sinus mucosal thickening  No air-fluid levels and suggests acute sinusitis  Workstation performed: [id number]     X-ray Chest 2 Views    Result Date: 2/20/2018  Narrative: CHEST INDICATION:  Cough  Fever  COMPARISON:  April 27, 2014 VIEWS:  Frontal and lateral projections IMAGES:  3 FINDINGS:  Lungs adequately aerated  No consolidation or effusion  Perihilar infiltrate in the right upper lobe  Cardiac size within normal limits  No vascular congestion or peribronchial thickening  No pneumothorax or free air  Visualized osseous structures appear within normal limits for the patient's age  Impression: Right upper lobe infiltrate    Consider infectious process  Workstation performed: [id number]     Ct Head Without Contrast    Result Date: 2/19/2018  Narrative: CT BRAIN - WITHOUT CONTRAST INDICATION:  "generalized weakness, sent from doctor';s office" COMPARISON:  None  TECHNIQUE:  CT examination of the brain was performed  In addition to axial images, coronal reformatted images were created and submitted for interpretation  Radiation dose length product (DLP) for this visit:  1031 mGy-cm   This examination, like all CT scans performed in the [facility], was performed utilizing techniques to minimize radiation dose exposure, including the use of iterative reconstruction and automated exposure control  IMAGE QUALITY:  Diagnostic  FINDINGS:  PARENCHYMA:  Decreased attenuation is noted in the supratentorial white matter demonstrating an appearance most consistent with moderate microangiopathic change  Chronic-appearing bilateral basal ganglia lacunar infarcts  No intracranial mass, mass effect or midline shift  No CT signs of acute infarction  There is no parenchymal hemorrhage  VENTRICLES AND EXTRA-AXIAL SPACES:  Normal for patient's age  VISUALIZED ORBITS AND PARANASAL SINUSES:  Mild to moderate diffuse mucosal disease  CALVARIUM AND EXTRACRANIAL SOFT TISSUES:   Normal      Impression: No acute intracranial abnormality  Microangiopathic changes  Workstation performed: [id number]     Ir Bone Marrow Biopsy/aspiration    Result Date: 2/21/2018  Narrative: IMAGE-GUIDED BONE MARROW BIOPSY Indication: Leukocytosis Procedure and Findings: The procedure was performed in the prone position  1% lidocaine was used for local anesthetic and conscious sedation was administered  The right posterior inferior iliac spine was localized by CT and the low back was prepped and draped in sterile fashion  Using CT guidance, Arrow bone marrow biopsy needle was advanced through the cortex of the iliac spine into the marrow   Aspiration was performed and submitted to pathology for slide preparation  Diagnostic marrow cells were identified preliminarily  The needle was slightly withdrawn and redirected to obtain a core biopsy  The core was submitted to pathology  The puncture site was cleansed and a dressing was applied  This examination, like all CT scans performed in the [facility], was performed utilizing techniques to minimize radiation dose exposure, including the use of iterative reconstruction and automated exposure control  Sedation time: 30 minutes     Impression: Impression: Technically successful image guided bone marrow aspiration and core biopsy Workstation performed: [id number]         Assessment and Plan : This is a pleasant 60-year-old male with probable leukemia  His white count is fluctuating but staying below 100  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 according to the report I got but I am trying to obtain the official report  As long as this is not an M 3 leukemia I will initially start the patient on Hydrea 500 mg 3 times a day to bring the white count to a lower level and then by Monday we will give the patient Dr Raad Bell  I would advise starting hydration today  Since he has no history of heart failure I would start him on D5 half normal saline with an ampulla bicarb at around 100 cc an hour  We plan on starting him on hydroxyurea tomorrow once we obtain the flow cytometry results and then hopefully Dacogen on Monday once he has been on Hydrea for a few days to bring his blood counts down  I went over the risks benefits and alternatives to treatment  The patient does wish to proceed with treatment  I explained to him that regular induction would not be feasible at his age and may make him very sick and render him transfusion dependent for weeks if not months    He is not interested in that but is interested in some moderate form of treatment which would help improve his survival   I think the above plan will achieve this  He is agreeable to proceed  I went over both Hydrea and Dacogen along with the risks and benefits and he was agreeable so we will have him sign a consent and hopefully start him tomorrow  He will need a CBC with differential along with a CMP drawn every day  I agree with checking his magnesium and phosphorus daily also to watch for tumor lysis once we start him on treatment  He will be transfuse platelets if his platelet count is less than 20 and he should be transfused with blood if his hemoglobin goes below 7 or he is symptomatic

## 2018-02-22 NOTE — SOCIAL WORK
MCG Guide Used for Initial Round:  Medical Oncology GRG  Optimal GLOS: 4  Hospital Day:   2 days  DC Readiness:    · Activity level acceptable  · Floor to discharge  · Complete discharge planning · Pain and nausea absent or adequately managed  · Temperature status acceptable  · No infection, or status acceptable  · No neutropenia, or status acceptable  · Abdominal status acceptable  · Mucositis absent or adequately resolved  · Diarrhea absent or adequately controlled  · Blood cell count acceptable  · Hematologic complications absent or stabilized  · Neurologic status acceptable  · Electrolyte status acceptable  · Tumor lysis absent or resolved  · Malignant effusions absent or adequately controlled  · Pathologic fracture absent or stabilized  · General Discharge Criteria met · Intake acceptable  · No inpatient interventions needed       Identified Barriers:  Elevated WBCs, on IV Hydrea tid, IV hydration, monitoring blood levels  Discussion Date (Time): 02/22/18 with  Dr Isabell Barrera

## 2018-02-22 NOTE — ASSESSMENT & PLAN NOTE
· Patient presented to other campus for evaluation of pneumonia and elevated white count, his WBC today is 91 5  · As he had abnormal WBC was transferred to our campus for bone marrow biopsy and possible chemotherapy  · Discussed with Oncology, if proven not to be non M3 variant of acute leukemia he will be started on Hydrea and after few days on chemotherapy  · Follow-up bone marrow biopsy  · Follow-up cytometry  · Palliative Care has been involved for advanced plan of care for this patient whose prognosis regardless remains very poor, appreciated consultant note  · Prognosis according to Oncology at this time appears poor

## 2018-02-23 NOTE — ASSESSMENT & PLAN NOTE
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

## 2018-02-23 NOTE — ASSESSMENT & PLAN NOTE
· Patient presented to other campus for evaluation of pneumonia and elevated white count, his WBC today is 91 5  · As he had abnormal WBC was transferred to our campus for bone marrow biopsy and possible chemotherapy  · Result of biopsy or discussed with Oncology today, leukemia non M3 variant, for this reason patient will be started on Hydrea 1000 mg b i d  for 3 days and will be used with Dacogen on Monday  · Oncologist at the long conversation with patient and family about the fact that remission although possible is very unlikely and that the treatment might prolong patient's life of few weeks or at the best few months and that the overall prognosis remained poor and is high risk to develop complication and side effect from therapy

## 2018-02-23 NOTE — CASE MANAGEMENT
Notification of Discharge  This is a Notification of Discharge from our facility [address]  Please be advised that this patient has been discharge from our facility  Below you will find the admission and discharge date and time including the patients disposition  PRESENTATION DATE: 2/19/2018  4:35 PM  IP ADMISSION DATE: 2/19/18 1843  DISCHARGE DATE: 2/20/2018  8:02 PM  DISPOSITION: [facility] in the [facility] by Praneeth Shah for 2017  Network Utilization Review Department  Phone: [phone]; Fax [phone]  ATTENTION: The Network Utilization Review Department is now centralized for our 7 Facilities  Make a note that we have a new phone and fax numbers for our Department  Please call with any questions or concerns to [phone] and carefully follow the prompts so that you are directed to the right person  All voicemails are confidential  Fax any determinations, approvals, denials, and requests for initial or continue stay review clinical to [phone]  Due to HIGH CALL volume, it would be easier if you could please send faxed requests to expedite your requests and in part, help us provide discharge notifications faster

## 2018-02-23 NOTE — PLAN OF CARE
DISCHARGE PLANNING - CARE MANAGEMENT     Discharge to post-acute care or home with appropriate resources Progressing        Nutrition/Hydration-ADULT     Nutrient/Hydration intake appropriate for improving, restoring or maintaining nutritional needs Progressing        Potential for Falls     Patient will remain free of falls Progressing        Prexisting or High Potential for Compromised Skin Integrity     Skin integrity is maintained or improved Progressing        SKIN/TISSUE INTEGRITY - ADULT     Skin integrity remains intact Progressing

## 2018-02-23 NOTE — PROGRESS NOTES
Progress note - Palliative and Supportive Care   Rohan To 80 y o  male [id number]    Assessment:     Patient Active Problem List   Diagnosis    Allergic rhinitis    Atopic dermatitis    Balance problems    Enlarged prostate without lower urinary tract symptoms (luts)    Erectile dysfunction    Folliculitis    Hematuria, gross    Hyperglycemia    Melanoma in situ ([facility] )    Peripheral arterial disease (HCC)    Peripheral neuropathy    Peripheral vascular disease ([facility] )    Lethargic    Myalgia    Fever    Monocytosis    Generalized skin papules    SIRS (systemic inflammatory response syndrome) (HCC)    Lethargy    Anemia    Thrombocytopenia (HCC)    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([facility] )    MARIA VICTORIA (acute kidney injury) ([facility] )    CAP (community acquired pneumonia)    AML (acute myeloblastic leukemia) ([place] [address] )    Skin abnormality         Plan:  1  Symptom management -    Probable Leukemic Rash on back - triamcinolone per dermatology  2  Goals - patient is focused on biopsy results and possible therapy options  We will continue to provide support and work towards advanced care planning    -     Code Status: DNR/DNI - Level 3   Decisional apparatus:  Patient is competent on my exam today  If competence is lost, patient's substitute decision maker would default to spouse by [place]  Advance Directive / Living Will / POLST:  Working on completing     Interval history:       Patient evaluated at bedside  Bone marrow biopsy revealed diagnosis of AML  Patient is to be given therapy with Oncology during this hospital stay  Oncology discussed poor prognosis with the patient  The today patient feels okay but is understandably worried about his diagnosis      MEDICATIONS / ALLERGIES:    all current active meds have been reviewed and current meds:   Current Facility-Administered Medications   Medication Dose Route Frequency    acetaminophen (TYLENOL) tablet 650 mg  650 mg Oral Q6H PRN    allopurinol (ZYLOPRIM) tablet 100 mg  100 mg Oral Daily    azithromycin (ZITHROMAX) 500 mg in sodium chloride 0 9% 250mL IVPB 500 mg  500 mg Intravenous Once    benzonatate (TESSALON PERLES) capsule 100 mg  100 mg Oral TID PRN    cefTRIAXone (ROCEPHIN) IVPB (premix) 1,000 mg  1,000 mg Intravenous Q24H    guaiFENesin (MUCINEX) 12 hr tablet 600 mg  600 mg Oral Q12H JAMES    hydroxyurea (HYDREA) capsule 1,000 mg  1,000 mg Oral Q12H JAMES    hydrOXYzine HCL (ATARAX) tablet 25 mg  25 mg Oral Q6H PRN    sodium bicarbonate 50 mEq in dextrose 5 % 1,000 mL infusion  50 mL/hr Intravenous Continuous    sodium chloride infusion 0 45 %  50 mL/hr Intravenous Continuous    triamcinolone (KENALOG) 0 1 % cream   Topical BID       Allergies   Allergen Reactions    Iodinated Diagnostic Agents      IVP dye       OBJECTIVE:    Physical Exam  Physical Exam   Constitutional: He is cooperative  No distress  HENT:   Head: Atraumatic  Right Ear: External ear normal    Left Ear: External ear normal    Nose: Nose normal    Eyes: Lids are normal  Right eye exhibits no exudate  Left eye exhibits no exudate  Neck: No JVD present  Pulmonary/Chest: Effort normal  No respiratory distress  Abdominal: Normal appearance  He exhibits no distension  Musculoskeletal: He exhibits no edema  Neurological: He is alert  Skin: Skin is warm and dry  Rash noted  He is not diaphoretic  Papular erythematous rash on back with crusting  Psychiatric: He has a normal mood and affect  Nursing note and vitals reviewed  Lab Results:   I have personally reviewed pertinent labs  , CBC:   Lab Results   Component Value Date     69 (HH) 02/23/2018    HGB 7 1 (L) 02/23/2018    HCT 20 2 (L) 02/23/2018    MCV 92 02/23/2018    PLT 32 (LL) 02/23/2018    MCH 32 3 02/23/2018    MCHC 35 1 02/23/2018    RDW 13 9 02/23/2018    MPV 10 2 02/23/2018    NRBC 0 02/23/2018   , CMP:   Lab Results   Component Value Date     (L) 02/23/2018    K 3 9 02/23/2018  02/23/2018    CO2 21 02/23/2018    ANIONGAP 7 02/23/2018    BUN 12 02/23/2018    CREATININE 1 10 02/23/2018    GLUCOSE 106 02/23/2018    CALCIUM 7 4 (L) 02/23/2018    EGFR 60 02/23/2018     Imaging Studies: none new  EKG, Pathology, and Other Studies: biopsy pending    Counseling / Coordination of Care  Total floor / unit time spent today 15+ minutes  Greater than 50% of total time was spent with the patient and / or family counseling and / or coordination of care  A description of the counseling / coordination of care: symptom assessment, psychosocial support

## 2018-02-23 NOTE — ASSESSMENT & PLAN NOTE
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

## 2018-02-23 NOTE — ASSESSMENT & PLAN NOTE
· Unclear if patient was septic upon admission in a different campus, SIRS criteria at this time were met and also patient had acute kidney injury unclear at this time if related to severe sepsis  · Patient continues on antibiotics, day 5 of 5 of azithromycin, day 5 of 7 of Rocephin  · Continue with supportive care  · Patient remains afebrile, unreliable monitoring of WBC in settings of suspected acute myeloid leukemia

## 2018-02-23 NOTE — PROGRESS NOTES
Oncology Progress Note  Zack Roca 80 y o  male MRN: [id number]  Unit/Bed#: [facility] 630-01 Encounter: [id number]      BP 99/50   Pulse 80   Temp 99 9 °F (37 7 °C) (Oral)   Resp 20   Ht 5' 10" (1 778 m)   Wt 73 3 kg (161 lb 9 6 oz) Comment: Standing   SpO2 92%   BMI 23 19 kg/m²     Subjective:   No new complaints    Objective:     General Appearance:    Alert, oriented        Eyes:    PERRL   Ears:    Normal external ear canals, both ears   Nose:   Nares normal, septum midline   Throat:   Mucosa moist  Pharynx without injection      Neck:   Supple       Lungs:     Clear to auscultation bilaterally   Chest Wall:    No tenderness or deformity    Heart:    Regular rate and rhythm       Abdomen:     Soft, non-tender, bowel sounds +, no organomegaly           Extremities:   Extremities no cyanosis or edema       Skin:    Does have evidence of leukemia cutis on the back    Lymph nodes:   Cervical, supraclavicular, and axillary nodes normal   Neurologic:   CNII-XII intact, normal strength, sensation and reflexes     throughout        Recent Results (from the past 48 hour(s))   Tissue Exam    Collection Time: 02/21/18  1:44 PM   Result Value Ref Range    Case Report       Surgical Pathology Report                         Case: [id number]                                   Authorizing Provider:  Lucho Gonzalez MD             Collected:           02/21/2018 1345              Ordering Location:     [address] [facility]      Received:            02/21/2018 [address] [place] [address] [address]                                                              Pathologist:           Kianna Bales MD                                                         Specimens:   A) - Iliac Crest, Right, Core                                                                       B) - Iliac Crest, Right, Clot                                                                       C) - Iliac Crest, Right, Slides x12 and touch prep x1                                      Final Diagnosis       A & B  Bone marrow, right iliac, core needle biopsy & aspirate clot section:  - Acute myeloid leukemia with monocytic differentiation, in which monoblasts comprise 70-75% of total marrow cells in a nearly packed (>95% cell) marrow - see Note  - Nnormoblastic and progressive trilineage hematopoiesis is present but appears reduced, M:E > 8:1   - Stainable macrophage storage iron is present   - Reticulin fibrosis (cytochemical stains) is grade 0 to 1 of 3 in [facility] system  - Lymphocytes and plasma cells appear mature, scattered, not increased  - No collagen fibrosis, no granulomata, no vasculitis and no necrosis  C   Bone marrow, right iliac, aspirate slides (10):  - Acute myeloid leukemia with monocytic features, blasts ~ 80% of marrow cells  - Left-shifted but progressive trilineage hematopoiesis, without dysplastic morphologic features  - Lymphocytes & plasma cells appear mature, scattered & without morphologic atypia to suggest neoplasia  - Iron stains reveal adequate (1+/4+) macrophage storage iron, no ringed sideroblasts  - Flow cytometry ([id number], evaluated by Dr Melida Durbin) reveals:   * Acute myeloid leukemia with monocytic differentiation, favor acute monoblastic leukemia in which blasts are  65-70% of total cells, with following immunophenotype:    -- positive: CD34,  (dim), CD33 (subset), CD13 (dim), CD64 (dim), CD11b (dim), CD11c (dim), CD2 (dim), CD4 (dim), CD7 (dim), CD38, HLA-DR, CD45 (variable)    -- negative:  CD56, CD14, and other lymphoid markers  * Rare B-cells are seen (0 1% of total) & T-cells (0 5% of total) show no pan T-cell antigen deletion  The CD4:CD8 ratio is  within normal limits  * Viability 7AAD: 92%     * The following antigens were evaluated & found to be expressed as described above or by appropriate cells: CD2, CD3, CD4, CD5, CD7, CD8, CD10, CD11b, CD11c, CD13, CD14, CD16, CD19, CD20, CD22, CD23, CD33, CD34, CD38, CD45, CD56, CD57, CD64, , FMC-7, HLA-DR, Carlos A Moralesda  - FISH, Cytogenetics, [employer] NGS AML      Note       Patient has acute monoblastic leukemia  Reported to Gauri Godfrey by Moraima Castillo on 2/21/2018  Cytogenetics and NGS pending  Findings communicated to Dr Carolyn Arevalo assistant on 2/21/2018 by CHERRY Parrish  Immunohistochemical stains, evaluated with appropriate positive & negative controls, are employed in the enumeration of marrow myeloblasts (CD34, , myeloperoxidase [MPO] & muramidase) as well as in the identification & enumeration of megakaryocytes (CD61)  Results of these findings are given in the final diagnosis and reveal acute myeloid leukemia with monocytic differentiation, in which CD34(+)/muramidase(+)/myleoperoxidase-negative monoblasts & promonoblasts comprise 70-75% of total marrow cells in a nearly packed (>95% cell) marrow  Final report is telephonically discussed by Dr MCLAIN [facility] with Dr Jerica Calderón @ 2:50 PM, 2/22/2018 and is faxed to his office @ 3:45 PM, that same day  Clinical Data:  - CBC (2/20/2018): WBC 79,460; Hgb 6 9; Hct 19 7; MCV 93; MCHC 35 0; PLAT 23,000  WBC Diff:  nRBC = 0/100 WBC, segs 0%, bands 1%, lymphs 11%, monos 73%, atypical lymphs 6%, blasts 9%  - Flow cytometry of peripheral blood ([facility] [id number], evaluated by Jennifer Maynard) shows acute monoblastic leukemia, % abnormal cells = 86%, Blasts are positive for CD2, CD4, CD11b, CD11c, CD13, CD14 (negative to variable on subset), CD33, CD34 (dim), CD38, CD45,  CD64 and HLA-DR  Additional Information       All controls performed with the immunohistochemical stains reported above reacted appropriately  These tests were developed and their performance characteristics determined by [facility] or [facility]  They may not be cleared or approved by the [place]  Food and Drug Administration   The FDA has determined that such clearance or approval is not necessary  These tests are used for clinical purposes  They should not be regarded as investigational or for research  This laboratory has been approved by [place] [id number], designated as a high-complexity laboratory and is qualified to perform these tests  Gross Description       A  The specimen is received in formalin, labeled with the patient's name and hospital number, and is designated "right iliac core, are 2 cores of tan-red and hemorrhagic osseous tissue measuring 1 5 and 0 8 cm in length by 0 2 cm in diameter each  The specimen is entirely submitted following decalcification in immunocal   1 cassette  B  The specimen is received in formalin, labeled with the patient's name and hospital number, and is designated "Right iliac clot, is a 2 0 x 1 4 x 0 3 cm aggregate of dark red and hemorrhagic clotted blood  Entirely submitted  1 cassette  C  The specimen consists of 13 bone marrow aspirate slides from the right iliac crest, labeled with the patient's name and hospital number  The slides are entirely submitted for staining interpretation  Note: The estimated total formalin fixation time based upon information provided by the submitting clinician and the standard processing schedule is under 72 hours    RRavotti      Clinical Information squamous cell ca    Hemoglobin and hematocrit, blood    Collection Time: 02/21/18  6:00 PM   Result Value Ref Range    Hemoglobin 7 5 (L) 12 0 - 17 0 g/dL    Hematocrit 21 2 (L) 36 5 - 49 3 %   CBC and differential    Collection Time: 02/22/18  5:18 AM   Result Value Ref Range    WBC 76 61 (HH) 4 31 - 10 16 Thousand/uL    RBC 2 16 (L) 3 88 - 5 62 Million/uL    Hemoglobin 7 1 (L) 12 0 - 17 0 g/dL    Hematocrit 19 7 (L) 36 5 - 49 3 %    MCV 91 82 - 98 fL    MCH 32 9 26 8 - 34 3 pg    MCHC 36 0 31 4 - 37 4 g/dL    RDW 14 1 11 6 - 15 1 %    MPV 9 1 8 9 - 12 7 fL    Platelets 25 (LL) 953 - 390 Thousands/uL    nRBC 0 /100 WBCs   Basic metabolic panel    Collection Time: 02/22/18  5:18 AM   Result Value Ref Range    Sodium 139 136 - 145 mmol/L    Potassium 4 0 3 5 - 5 3 mmol/L    Chloride 109 (H) 100 - 108 mmol/L    CO2 21 21 - 32 mmol/L    Anion Gap 9 4 - 13 mmol/L    BUN 14 5 - 25 mg/dL    Creatinine 0 96 0 60 - 1 30 mg/dL    Glucose 95 65 - 140 mg/dL    Calcium 7 2 (L) 8 3 - 10 1 mg/dL    eGFR 71 ml/min/1 73sq m   Magnesium    Collection Time: 02/22/18  5:18 AM   Result Value Ref Range    Magnesium 2 2 1 6 - 2 6 mg/dL   Phosphorus    Collection Time: 02/22/18  5:18 AM   Result Value Ref Range    Phosphorus 2 5 2 3 - 4 1 mg/dL   Manual Differential(PHLEBS Do Not Order)    Collection Time: 02/22/18  5:18 AM   Result Value Ref Range    Segmented % 0 (L) 43 - 75 %    Lymphocytes % 12 (L) 14 - 44 %    Monocytes % 63 (H) 4 - 12 %    Eosinophils % 0 0 - 6 %    Basophils % 0 0 - 1 %    Blasts % 12 (H) <=0 %    Atypical Lymphocytes % 13 (H) <=0 %    Absolute Neutrophils 0 00 (L) 1 85 - 7 62 Thousand/uL    Lymphocytes Absolute 9 19 (H) 0 60 - 4 47 Thousand/uL    Monocytes Absolute 48 26 (H) 0 00 - 1 22 Thousand/uL    Eosinophils Absolute 0 00 0 00 - 0 40 Thousand/uL    Basophils Absolute 0 00 0 00 - 0 10 Thousand/uL    Total Counted 100     Platelet Estimate Decreased (A) Adequate   CBC and differential    Collection Time: 02/22/18  7:54 PM   Result Value Ref Range    WBC 80 85 (HH) 4 31 - 10 16 Thousand/uL    RBC 2 37 (L) 3 88 - 5 62 Million/uL    Hemoglobin 7 7 (L) 12 0 - 17 0 g/dL    Hematocrit 21 7 (L) 36 5 - 49 3 %    MCV 92 82 - 98 fL    MCH 32 5 26 8 - 34 3 pg    MCHC 35 5 31 4 - 37 4 g/dL    RDW 14 1 11 6 - 15 1 %    MPV 9 6 8 9 - 12 7 fL    Platelets 32 (LL) 238 - 390 Thousands/uL    nRBC 0 /100 WBCs   Manual Differential(PHLEBS Do Not Order)    Collection Time: 02/22/18  7:54 PM   Result Value Ref Range    Segmented % 0 (L) 43 - 75 %    Lymphocytes % 26 14 - 44 %    Monocytes % 66 (H) 4 - 12 %    Eosinophils % 0 0 - 6 %    Basophils % 0 0 - 1 %    Blasts % 3 (H) <=0 %    Atypical Lymphocytes % 5 (H) <=0 %    Absolute Neutrophils 0 00 (L) 1 85 - 7 62 Thousand/uL    Lymphocytes Absolute 21 02 (H) 0 60 - 4 47 Thousand/uL    Monocytes Absolute 53 36 (H) 0 00 - 1 22 Thousand/uL    Eosinophils Absolute 0 00 0 00 - 0 40 Thousand/uL    Basophils Absolute 0 00 0 00 - 0 10 Thousand/uL    Total Counted 100     Anisocytosis Present     Platelet Estimate Decreased (A) Adequate   CBC and differential    Collection Time: 02/23/18  6:04 AM   Result Value Ref Range     69 (HH) 4 31 - 10 16 Thousand/uL    RBC 2 20 (L) 3 88 - 5 62 Million/uL    Hemoglobin 7 1 (L) 12 0 - 17 0 g/dL    Hematocrit 20 2 (L) 36 5 - 49 3 %    MCV 92 82 - 98 fL    MCH 32 3 26 8 - 34 3 pg    MCHC 35 1 31 4 - 37 4 g/dL    RDW 13 9 11 6 - 15 1 %    MPV 10 2 8 9 - 12 7 fL    Platelets 32 (LL) 374 - 390 Thousands/uL    nRBC 0 /100 WBCs   Basic metabolic panel    Collection Time: 02/23/18  6:04 AM   Result Value Ref Range    Sodium 134 (L) 136 - 145 mmol/L    Potassium 3 9 3 5 - 5 3 mmol/L    Chloride 106 100 - 108 mmol/L    CO2 21 21 - 32 mmol/L    Anion Gap 7 4 - 13 mmol/L    BUN 12 5 - 25 mg/dL    Creatinine 1 10 0 60 - 1 30 mg/dL    Glucose 106 65 - 140 mg/dL    Calcium 7 4 (L) 8 3 - 10 1 mg/dL    eGFR 60 ml/min/1 73sq m   Magnesium    Collection Time: 02/23/18  6:04 AM   Result Value Ref Range    Magnesium 2 0 1 6 - 2 6 mg/dL   Phosphorus    Collection Time: 02/23/18  6:04 AM   Result Value Ref Range    Phosphorus 1 6 (L) 2 3 - 4 1 mg/dL   Manual Differential(PHLEBS Do Not Order)    Collection Time: 02/23/18  6:04 AM   Result Value Ref Range    Segmented % 0 (L) 43 - 75 %    Lymphocytes % 11 (L) 14 - 44 %    Monocytes % 84 (H) 4 - 12 %    Eosinophils % 0 0 - 6 %    Basophils % 0 0 - 1 %    Blasts % 3 (H) <=0 %    Atypical Lymphocytes % 2 (H) <=0 %    Absolute Neutrophils 0 00 (L) 1 85 - 7 62 Thousand/uL    Lymphocytes Absolute 11 30 (H) 0 60 - 4 47 Thousand/uL    Monocytes Absolute 86 26 (H) 0 00 - 1 22 Thousand/uL    Eosinophils Absolute 0 00 0 00 - 0 40 Thousand/uL    Basophils Absolute 0 00 0 00 - 0 10 Thousand/uL    Total Counted 100     Platelet Estimate Decreased (A) Adequate         Xr Sinuses Routine 3+ Views    Result Date: 2/1/2018  Narrative: PARANASAL SINUSES INDICATION:  Congestion  COMPARISON:  None VIEWS:  5 IMAGES:  5 FINDINGS: No evidence of air-fluid levels  There is suggestion of mucosal thickening in the left maxillary sinus  No lytic or blastic lesions are identified  Impression: Suggestion of left maxillary sinus mucosal thickening  No air-fluid levels and suggests acute sinusitis  Workstation performed: [id number]     X-ray Chest 2 Views    Result Date: 2/20/2018  Narrative: CHEST INDICATION:  Cough  Fever  COMPARISON:  April 27, 2014 VIEWS:  Frontal and lateral projections IMAGES:  3 FINDINGS:  Lungs adequately aerated  No consolidation or effusion  Perihilar infiltrate in the right upper lobe  Cardiac size within normal limits  No vascular congestion or peribronchial thickening  No pneumothorax or free air  Visualized osseous structures appear within normal limits for the patient's age  Impression: Right upper lobe infiltrate  Consider infectious process  Workstation performed: [id number]     Ct Head Without Contrast    Result Date: 2/19/2018  Narrative: CT BRAIN - WITHOUT CONTRAST INDICATION:  "generalized weakness, sent from doctor';s office" COMPARISON:  None  TECHNIQUE:  CT examination of the brain was performed  In addition to axial images, coronal reformatted images were created and submitted for interpretation  Radiation dose length product (DLP) for this visit:  1031 mGy-cm   This examination, like all CT scans performed in the [facility], was performed utilizing techniques to minimize radiation dose exposure, including the use of iterative reconstruction and automated exposure control  IMAGE QUALITY:  Diagnostic   FINDINGS:  PARENCHYMA: Decreased attenuation is noted in the supratentorial white matter demonstrating an appearance most consistent with moderate microangiopathic change  Chronic-appearing bilateral basal ganglia lacunar infarcts  No intracranial mass, mass effect or midline shift  No CT signs of acute infarction  There is no parenchymal hemorrhage  VENTRICLES AND EXTRA-AXIAL SPACES:  Normal for patient's age  VISUALIZED ORBITS AND PARANASAL SINUSES:  Mild to moderate diffuse mucosal disease  CALVARIUM AND EXTRACRANIAL SOFT TISSUES:   Normal      Impression: No acute intracranial abnormality  Microangiopathic changes  Workstation performed: [id number]     Ir Bone Marrow Biopsy/aspiration    Result Date: 2/21/2018  Narrative: IMAGE-GUIDED BONE MARROW BIOPSY Indication: Leukocytosis Procedure and Findings: The procedure was performed in the prone position  1% lidocaine was used for local anesthetic and conscious sedation was administered  The right posterior inferior iliac spine was localized by CT and the low back was prepped and draped in sterile fashion  Using CT guidance, Arrow bone marrow biopsy needle was advanced through the cortex of the iliac spine into the marrow  Aspiration was performed and submitted to pathology for slide preparation  Diagnostic marrow cells were identified preliminarily  The needle was slightly withdrawn and redirected to obtain a core biopsy  The core was submitted to pathology  The puncture site was cleansed and a dressing was applied  This examination, like all CT scans performed in the [facility], was performed utilizing techniques to minimize radiation dose exposure, including the use of iterative reconstruction and automated exposure control  Sedation time: 30 minutes     Impression: Impression: Technically successful image guided bone marrow aspiration and core biopsy Workstation performed: [id number]         Assessment and Plan :    This is an unfortunate 49-year-old male  With newly diagnosed AML  I will start him on hydroxyurea a 1000 milligrams twice a day  This will be for 3 days  We will then give him Dacogen  I want his white count to come down a little prior to initiating Dacogen because of his age and to be safe and to see how he tolerates initial chemotherapy  I would recommend starting allopurinol 100 milligrams daily  I would continue with his hydration  I would  I advised transfusing him if hemoglobin less than 7 and platelets less than 20  I had a long discussion with his daughter-in-law a knee with the nurse  I went over the prognosis  I explained without treatment his life expectancy would be a few days to a few weeks  If he has a nice response life expectancy obviously would be increased to weeks to months  The patient's family is agreeable to proceed  The patient himself is very keen on being treated  He does understand the poor prognosis  I gave him paperwork both on hydroxyurea and Dacogen today  I went over risks benefits and alternatives to therapy  I went over the risks and the side effects including low blood counts, need for constant transfusion support, risk of infection, risk of complications  He is agreeable to proceed  He will probably need a port placed prior to discharge as he will need constant transfusion support  This can be done   Prior to discharge as he does have good IV access at this point  If his platelets are low he can be transfused prior to having a port placed  We will follow along with you  Please call with any questions

## 2018-02-23 NOTE — PROGRESS NOTES
Progress Note - Arden Burgess 7/24/1931, 80 y o  male MRN: [id number]    Unit/Bed#: [facility] 630-01 Encounter: [id number]    Primary Care Provider: Paige Andrews MD   Date and time admitted to hospital: 2/20/2018  8:34 PM        Skin abnormality   Assessment & Plan    · Of the back, papular rash, dark red in color, palpable, not blanchable  · 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  · Status post biopsy with dermatologist, pending report  · As per Dermatology apply triamcinolone cream        CAP (community acquired pneumonia)   Assessment & Plan    · Unclear if patient was septic upon admission in a different campus, SIRS criteria at this time were met and also patient had acute kidney injury unclear at this time if related to severe sepsis  · Patient continues on antibiotics, day 5 of 5 of azithromycin, day 5 of 7 of Rocephin  · Continue with supportive care  · Patient remains afebrile, unreliable monitoring of WBC in settings of suspected acute myeloid leukemia        MARIA VICTORIA (acute kidney injury) ([facility] )   Assessment & Plan    · Resolved, continue to monitor BMP        Thrombocytopenia (HCC)   Assessment & Plan    · 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  · CBC with platelets stable above 20, transfuse platelets only if below 20 or if bleeding or bruising        Anemia   Assessment & Plan    · 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  · Transfuse for hemoglobin less than 7 or for symptoms as per Oncology recommendation  · The hemoglobin 7 1, cbc repeat at 18:00 hemoglobin 7, will transfuse as per oncologist suggestion tomorrow to unit of packed red blood cells in preparation for chemotherapy on Monday        * AML (acute myeloblastic leukemia) ([facility] [address] )   Assessment & Plan    · Patient presented to other campus for evaluation of pneumonia and elevated white count, his WBC today is 91 5  · As he had abnormal WBC was transferred to our campus for bone marrow biopsy and possible chemotherapy  · Result of biopsy or discussed with Oncology today, leukemia non M3 variant, for this reason patient will be started on Hydrea 1000 mg b i d  for 3 days and will be used with Dacogen on Monday  · Oncologist at the long conversation with patient and family about the fact that remission although possible is very unlikely and that the treatment might prolong patient's life of few weeks or at the best few months and that the overall prognosis remained poor and is high risk to develop complication and side effect from therapy          VTE Pharmacologic Prophylaxis:  No  Pharmacologic: Severe thrombocytopenia  Mechanical VTE Prophylaxis in Place: Yes     Patient Centered Rounds: I have performed bedside rounds with nursing staff today      Discussions with Specialists or Other Care Team Provider:  Oncology     Education and Discussions with Family / Patient:  Patient  Call to family deferred as Oncology just at the long conversation with them      Time Spent for Care: 30 minutes   More than 50% of total time spent on counseling and coordination of care as described above      Current Length of Stay: 3 day(s)     Current Patient Status: Inpatient   Certification Statement: The patient will continue to require additional inpatient hospital stay due to Refer to above     Discharge Plan:  To be determined     Code Status: Level 3 - DNAR and DNI    Subjective:   Patient is feeling well today  He has no complaints  He is just anxious about the fact that he will need to start therapy with hydrated today and then chemotherapy on Monday  He is very concerned about the side effect although is also hopeful that the therapy might improve his leukemia and extend his life  Emotional support was provided  Objective:     Vitals:   Temp (24hrs), Av 3 °F (37 4 °C), Min:98 3 °F (36 8 °C), Max:99 9 °F (37 7 °C)    HR:  [80-86] 80  Resp:  [16-20] 20  BP: ()/(50-67) 115/67  SpO2:  [92 %-100 %] 100 %  Body mass index is 23 19 kg/m²       Input and Output Summary (last 24 hours): Intake/Output Summary (Last 24 hours) at 02/23/18 1807  Last data filed at 02/23/18 1512   Gross per 24 hour   Intake           471 67 ml   Output             1650 ml   Net         -1178 33 ml       Physical Exam:     Physical Exam   Constitutional: He is oriented to person, place, and time  He appears well-developed  Cardiovascular: Normal rate, regular rhythm and normal heart sounds  Exam reveals no friction rub  No murmur heard  Pulmonary/Chest: Effort normal  No respiratory distress  He has no wheezes  He has no rales  Abdominal: Soft  He exhibits no distension  There is no tenderness  There is no rebound  Musculoskeletal: He exhibits no edema  Neurological: He is alert and oriented to person, place, and time  He exhibits normal muscle tone  Skin: Skin is warm  Psychiatric: He has a normal mood and affect  Additional Data:     Labs:      Results from last 7 days  Lab Units 02/23/18  1706 02/23/18  0604   WBC Thousand/uL 123 30* 102 69*   HEMOGLOBIN g/dL 7 0* 7 1*   HEMATOCRIT % 20 5* 20 2*   PLATELETS Thousands/uL 31* 32*   LYMPHO PCT %  --  11*   MONO PCT MAN %  --  84*   EOSINO PCT MANUAL %  --  0       Results from last 7 days  Lab Units 02/23/18  0604  02/20/18  0606   SODIUM mmol/L 134*  < > 137   POTASSIUM mmol/L 3 9  < > 3 8   CHLORIDE mmol/L 106  < > 106   CO2 mmol/L 21  < > 20*   BUN mg/dL 12  < > 15   CREATININE mg/dL 1 10  < > 1 31*   CALCIUM mg/dL 7 4*  < > 7 3*   TOTAL PROTEIN g/dL  --   --  6 3*   BILIRUBIN TOTAL mg/dL  --   --  0 66   ALK PHOS U/L  --   --  66   ALT U/L  --   --  24   AST U/L  --   --  37   GLUCOSE RANDOM mg/dL 106  < > 116   < > = values in this interval not displayed  Results from last 7 days  Lab Units 02/20/18  0607   INR  1 39*       * I Have Reviewed All Lab Data Listed Above  * Additional Pertinent Lab Tests Reviewed:  All Labs Within Last 24 Hours Reviewed    Imaging:    Imaging Reports Reviewed Today Include:  None  Imaging Personally Reviewed by Myself Includes:  None    Recent Cultures (last 7 days):       Results from last 7 days  Lab Units 02/21/18  0906 02/20/18  1457 02/19/18  1708 02/19/18  1642   BLOOD CULTURE   --   --   --  No Growth at 72 hrs  No Growth at 72 hrs  SPUTUM CULTURE  3+ Growth of   --   --   --    GRAM STAIN RESULT  1+ Epithelial cells per low power field  Rare Polys  3+ Gram negative rods  1+ Gram positive cocci in pairs  Rare Gram positive rods  --   --   --    INFLUENZA A PCR   --   --  None Detected  --    INFLUENZA B PCR   --   --  None Detected  --    RSV PCR   --   --  None Detected  --    LEGIONELLA URINARY ANTIGEN   --  Negative  --   --        Last 24 Hours Medication List:     Current Facility-Administered Medications:  acetaminophen 650 mg Oral Q6H PRN Italo Mendoza MD    allopurinol 100 mg Oral Daily Miguelangel Luis MD    azithromycin 500 mg Intravenous Once Miguelangel Luis MD    benzonatate 100 mg Oral TID PRN Italo Mendoza MD    cefTRIAXone 1,000 mg Intravenous Q24H Italo Mendoza MD Last Rate: Stopped (02/23/18 1501)   guaiFENesin 600 mg Oral Q12H [address] Italo Mendoza MD    hydroxyurea 1,000 mg Oral Q12H [address], MD    hydrOXYzine HCL 25 mg Oral Q6H PRN Italo Mendoza MD    sodium bicarbonate infusion 50 mL/hr Intravenous Continuous Miguelangel Luis MD Last Rate: 50 mL/hr (02/23/18 1352)   sodium chloride 50 mL/hr Intravenous Continuous Miguelangel Luis MD Last Rate: 50 mL/hr (02/23/18 1211)   triamcinolone  Topical BID [facility]  Today, Patient Was Seen By: Miguelangel Luis MD    ** Please Note: Dragon 360 Dictation voice to text software may have been used in the creation of this document   **

## 2018-02-24 NOTE — ASSESSMENT & PLAN NOTE
· Patient presented to other campus for evaluation of pneumonia and elevated white count, his WBC today is 91 5  · As he had abnormal WBC was transferred to our campus for bone marrow biopsy and possible chemotherapy  · Result of biopsy or discussed with Oncology, leukemia non M3 variant  · Continue with Hydrea 1000 mg b i d  for 2 days (total day 3) and chemo with Dacogen on Monday  · Continues on IV fluids with D5 plus half-normal saline plus 50 mEq of bicarbonate

## 2018-02-24 NOTE — PROGRESS NOTES
Patient: Catie Koch  Patient MRN: [id number]  Service date: 2/24/2018  Attending Physician:       CHIEF COMPLAIN  No chief complaint on file  Heme / Oncology history:  Catie Koch is a 80 y o  male  With newly diagnosed AML  on hydroxyurea a 1000 milligrams twice a day from 2/23  planning for 3 days,  then give him Dacogen  Pt reported doing well  No fever, chills  No bleeding  HISTORY OF PRESENT ILLNESS:  Catie Koch is a 80 y o  male who presents      PAST MEDICAL HISTORY:   has no past medical history on file  PAST SURGICAL HISTORY:   has a past surgical history that includes Hernia repair (12/09/2003); Colonoscopy (N/A, 2012); Tonsillectomy (N/A); pr phleb veins - extrem - to 20 (Left, 1/15/2016); pr endovenous laser, 1st vein (Left, 1/15/2016); Colonoscopy (01/2013); and Cystoscopy  CURRENT MEDICATIONS  Scheduled Meds:  Current Facility-Administered Medications:  acetaminophen 650 mg Oral Q6H PRN Italo Mendoza MD    acyclovir 400 mg Oral Q12H Sb Prakash MD PhD    allopurinol 100 mg Oral Daily Miguelangel Luis MD    benzonatate 100 mg Oral TID PRN Italo Mendoza MD    cefTRIAXone 1,000 mg Intravenous Q24H Italo Mendoza MD Last Rate: Stopped (02/23/18 1501)   guaiFENesin 600 mg Oral Q12H [address] Italo Mendoza MD    hydroxyurea 1,000 mg Oral Q12H [address] MD    hydrOXYzine HCL 25 mg Oral Q6H PRN Italo Mendoza MD    sodium bicarbonate infusion 50 mL/hr Intravenous Continuous Miguelangel Luis MD Last Rate: 50 mL/hr (02/23/18 1352)   sodium chloride 50 mL/hr Intravenous Continuous Miguelangel Luis MD Last Rate: Stopped (02/24/18 0002)   triamcinolone  Topical BID [facility]  Continuous Infusions:  sodium bicarbonate infusion 50 mL/hr Last Rate: 50 mL/hr (02/23/18 1352)   sodium chloride 50 mL/hr Last Rate: Stopped (02/24/18 0002)     PRN Meds:   acetaminophen    benzonatate    hydrOXYzine HCL    SOCIAL HISTORY:   reports that he has never smoked   He has never used smokeless tobacco  He reports that he drinks alcohol  His drug history is not on file  FAMILY HISTORY:  family history includes Coronary artery disease in his father and mother; Heart disease in his father; Stroke in his mother  ALLERGIES:  is allergic to iodinated diagnostic agents  REVIEW OF SYSTEMS:  Please note that a 14-point review of systems was performed to include Constitutional, HEENT, Respiratory, CVS, GI, , Musculoskeletal, Integumentary, Neurologic, Rheumatologic, Endocrinologic, Psychiatric, Lymphatic, and Hematologic/Oncologic systems were reviewed and are negative unless otherwise stated in HPI  Positive and negative findings pertinent to this evaluation are incorporated into the history of present illness  PHYSICAL EXAMINATION:  Vital Signs: Temp:  [98 3 °F (36 8 °C)-99 6 °F (37 6 °C)] 98 9 °F (37 2 °C)  HR:  [80-86] 84  Resp:  [18-20] 18  BP: (115-121)/(67-72) 121/72  Body mass index is 23 19 kg/m²  Body surface area is 1 91 meters squared  Constitutional: Alert and oriented  HEENT: Anicteric, PERRLA  Chest: Decreased breathing sound bilaterally, No wheezes/rales/rhonchi  CVS: Regular rhythm  Normal rate  Abdomen: Soft, nontender, nondistended  Bowel sounds positive X 4  No palpable organomegaly  Extremities: No cyanosis/clubbing/edema  Integumentary: No obvious rashes or bruises  Musculoskeletal: No obvious bony or joint deformities  Psychiatric: Appropriate affect and mood  Lymph Node Survey: No palpable preauricular, submandibular, cervical, supraclavicular, axillary, epitrochlear or inguinal lymphadenopathy      LABS:    Results from last 7 days  Lab Units 02/24/18  0521 02/23/18  1706 02/23/18  0604   WBC Thousand/uL 106 65* 123 30* 102 69*   HEMATOCRIT % 18 7* 20 5* 20 2*   PLATELETS Thousands/uL 31* 31* 32*   MONO PCT MAN % 58* 69* 84*       Results from last 7 days  Lab Units 02/24/18  0521 02/23/18  0604 02/22/18  0518   SODIUM mmol/L 139 134* 139   POTASSIUM mmol/L 4 0 3 9 4 0   CHLORIDE mmol/L 107 106 109*   CO2 mmol/L 22 21 21   BUN mg/dL 12 12 14       Results from last 7 days  Lab Units 02/24/18  0521 02/23/18  0604 02/22/18  0518  02/20/18  0606  02/19/18  1642   PHOSPHORUS mg/dL 3 2 1 6* 2 5  < > 2 7  --   --    MAGNESIUM mg/dL 2 1 2 0 2 2  --  2 0  < >  --    ALBUMIN g/dL  --   --   --   --  2 5*  --  3 2*   BILIRUBIN TOTAL mg/dL  --   --   --   --  0 66  --  0 75   ALK PHOS U/L  --   --   --   --  66  --  79   ALT U/L  --   --   --   --  24  --  25   AST U/L  --   --   --   --  37  --  40   < > = values in this interval not displayed      Results from last 7 days  Lab Units 02/20/18  0607 02/19/18  2310   INR  1 39* 1 37*   PTT seconds 41* 46*           Invalid input(s): TNI,  PCT      Results from last 7 days  Lab Units 02/20/18  0606   LD U/L 486*           IMAGING:  IR bone marrow biopsy/aspiration   Final Result   Impression:      Technically successful image guided bone marrow aspiration and core biopsy         Workstation performed: [id number]             PROBLEM LIST:  Patient Active Problem List   Diagnosis    Allergic rhinitis    Atopic dermatitis    Balance problems    Enlarged prostate without lower urinary tract symptoms (luts)    Erectile dysfunction    Folliculitis    Hematuria, gross    Hyperglycemia    Melanoma in situ ([facility] [address] [facility]    Peripheral arterial disease (HCC)    Peripheral neuropathy    Peripheral vascular disease ([facility]    Lethargic    Myalgia    Fever    Monocytosis    Generalized skin papules    SIRS (systemic inflammatory response syndrome) (HCC)    Lethargy    Anemia    Thrombocytopenia (HCC)    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([facility]    MARIA VICTORIA (acute kidney injury) ([facility] [address] )    CAP (community acquired pneumonia)    AML (acute myeloblastic leukemia) ([facility] [address] [facility]    Skin abnormality       ASSESSMENT/PLAN:  Aki Cedeno is a 80 y o  male with:    1) AML:  - continue hydrea 1 g bid D#2    - continue allopurinol 100 mg  will check uric acid in Am    - add acyclovir 400 mg po bid     2) anemia : 2nd to cancer    - platelet stable in 89B, likely not from DIC   - 2 u ordered by primary team    - close monitor, keep Hb > 8 and plt > 20k         Lencho Álvarez MD PhD  Hematology / Oncology

## 2018-02-24 NOTE — PROGRESS NOTES
Progress Note - Asia Atkinson 7/24/1931, 80 y o  male MRN: [id number]    Unit/Bed#: [facility] 630-01 Encounter: [id number]    Primary Care Provider: Rosalva Hook MD   Date and time admitted to hospital: 2/20/2018  8:34 PM        Skin abnormality   Assessment & Plan    · Of the back, papular rash, dark red in color, palpable, not blanchable  · 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  · Status post biopsy with dermatologist, pending report  · As per Dermatology apply triamcinolone cream  · Improving with triamcinolone        CAP (community acquired pneumonia)   Assessment & Plan    · Unclear if patient was septic upon admission in a different campus, SIRS criteria at this time were met and also patient had acute kidney injury unclear at this time if related to severe sepsis  · Patient continues on antibiotics, completed azithromycin, day 6 of 7 of Rocephin  · Continue with supportive care  · Patient remains afebrile, unreliable monitoring of WBC in settings of suspected acute myeloid leukemia        MARIA VICTORIA (acute kidney injury) ([facility] )   Assessment & Plan    · Resolved, continue to monitor BMP        Thrombocytopenia (HCC)   Assessment & Plan    · 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  · Platelets stable around 30, transfuse if below 20        Anemia   Assessment & Plan    · 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  · Today, hemoglobin 6 8, transfuse 2 units of packed red blood cells  · I have obtained the consent, patient aware of the risk of severe reaction including pulmonary edema, hemolysis, death, infection comes to would like to proceed as part of this plan of care for future chemo Monday        * AML (acute myeloblastic leukemia) ([facility] )   Assessment & Plan    · Patient presented to other campus for evaluation of pneumonia and elevated white count, his WBC today is 91 5  · As he had abnormal WBC was transferred to our campus for bone marrow biopsy and possible chemotherapy  · Result of biopsy or discussed with Oncology, leukemia non M3 variant  · Continue with Hydrea 1000 mg b i d  for 2 days (total day 3) and chemo with Dacogen on Monday  · Continues on IV fluids with D5 plus half-normal saline plus 50 mEq of bicarbonate            VTE Pharmacologic Prophylaxis:  No  Pharmacologic: Severe thrombocytopenia  Mechanical VTE Prophylaxis in Place: Yes     Patient Centered Rounds: I have performed bedside rounds with nursing staff today      Discussions with Specialists or Other Care Team Provider:  Oncology     Education and Discussions with Family / Patient:  Patient  daughter Sharyn Valdez updated over the phone as per wife request     Time Spent for Care: 45 minutes   More than 50% of total time spent on counseling and coordination of care as described above      Current Length of Stay: 4 day(s)     Current Patient Status: Inpatient   Certification Statement: The patient will continue to require additional inpatient hospital stay due to Refer to above     Discharge Plan:  To be determined     Code Status: Level 3 - DNAR and DNI    Subjective:   Patient has no complaints  We had a long conversation about blood transfusion  Patient agreeable to proceed  Objective:     Vitals:   Temp (24hrs), Av 6 °F (37 °C), Min:98 °F (36 7 °C), Max:99 6 °F (37 6 °C)    HR:  [76-98] 76  Resp:  [18-20] 19  BP: (111-123)/(58-72) 123/70  SpO2:  [94 %-98 %] 96 %  Body mass index is 23 19 kg/m²  Input and Output Summary (last 24 hours): Intake/Output Summary (Last 24 hours) at 18 1817  Last data filed at 18 1454   Gross per 24 hour   Intake          1930 83 ml   Output             2150 ml   Net          -219 17 ml       Physical Exam:     Physical Exam   Constitutional: He is oriented to person, place, and time  He appears well-developed  Cardiovascular: Normal rate, regular rhythm and normal heart sounds  Exam reveals no friction rub  No murmur heard  Pulmonary/Chest: Effort normal  No respiratory distress  He has no wheezes   He has no rales  Abdominal: Soft  He exhibits no distension  There is no tenderness  There is no rebound  Musculoskeletal: He exhibits no edema  Neurological: He is alert and oriented to person, place, and time  He exhibits normal muscle tone  Skin: Skin is warm  Psychiatric: He has a normal mood and affect  Additional Data:     Labs:      Results from last 7 days  Lab Units 02/24/18  0521   WBC Thousand/uL 106 65*   HEMOGLOBIN g/dL 6 6*   HEMATOCRIT % 18 7*   PLATELETS Thousands/uL 31*   LYMPHO PCT % 19   MONO PCT MAN % 58*   EOSINO PCT MANUAL % 0       Results from last 7 days  Lab Units 02/24/18  0521  02/20/18  0606   SODIUM mmol/L 139  < > 137   POTASSIUM mmol/L 4 0  < > 3 8   CHLORIDE mmol/L 107  < > 106   CO2 mmol/L 22  < > 20*   BUN mg/dL 12  < > 15   CREATININE mg/dL 1 10  < > 1 31*   CALCIUM mg/dL 7 2*  < > 7 3*   TOTAL PROTEIN g/dL  --   --  6 3*   BILIRUBIN TOTAL mg/dL  --   --  0 66   ALK PHOS U/L  --   --  66   ALT U/L  --   --  24   AST U/L  --   --  37   GLUCOSE RANDOM mg/dL 105  < > 116   < > = values in this interval not displayed  Results from last 7 days  Lab Units 02/20/18  0607   INR  1 39*       * I Have Reviewed All Lab Data Listed Above  * Additional Pertinent Lab Tests Reviewed: All Labs Within Last 24 Hours Reviewed    Imaging:    Imaging Reports Reviewed Today Include:  None  Imaging Personally Reviewed by Myself Includes:  None    Recent Cultures (last 7 days):       Results from last 7 days  Lab Units 02/21/18  0906 02/20/18  1457 02/19/18  1708 02/19/18  1642   BLOOD CULTURE   --   --   --  No Growth After 4 Days  No Growth After 4 Days     SPUTUM CULTURE  3+ Growth of   --   --   --    GRAM STAIN RESULT  1+ Epithelial cells per low power field  Rare Polys  3+ Gram negative rods  1+ Gram positive cocci in pairs  Rare Gram positive rods  --   --   --    INFLUENZA A PCR   --   --  None Detected  --    INFLUENZA B PCR   --   --  None Detected  --    RSV PCR   --   -- None Detected  --    LEGIONELLA URINARY ANTIGEN   --  Negative  --   --        Last 24 Hours Medication List:     Current Facility-Administered Medications:  acetaminophen 650 mg Oral Q6H PRN Seth Schmidt MD    acyclovir 400 mg Oral Q12H Philly Doss MD PhD    allopurinol 100 mg Oral Daily Swetha Somers MD    benzonatate 100 mg Oral TID PRN Seth Schmidt MD    cefTRIAXone 1,000 mg Intravenous Q24H Seth Schmidt MD Last Rate: Stopped (02/24/18 1610)   guaiFENesin 600 mg Oral Q12H [facility] Seth Schmidt MD    hydroxyurea 1,000 mg Oral Q12H [address] MD    hydrOXYzine HCL 25 mg Oral Q6H PRN Seth Schmidt MD    melatonin 3 mg Oral HS Swetha Somers MD    sodium bicarbonate infusion 50 mL/hr Intravenous Continuous Swetha Somers MD Last Rate: Stopped (02/24/18 1225)   sodium chloride 50 mL/hr Intravenous Continuous Swetha Somers MD Last Rate: Stopped (02/24/18 1225)   triamcinolone  Topical BID Kiki Galarza  Today, Patient Was Seen By: Swetha Somers MD    ** Please Note: Dragon 360 Dictation voice to text software may have been used in the creation of this document   **

## 2018-02-24 NOTE — PLAN OF CARE
DISCHARGE PLANNING - CARE MANAGEMENT     Discharge to post-acute care or home with appropriate resources Progressing        INFECTION - ADULT     Absence or prevention of progression during hospitalization Progressing     Absence of fever/infection during neutropenic period Progressing        Nutrition/Hydration-ADULT     Nutrient/Hydration intake appropriate for improving, restoring or maintaining nutritional needs Progressing        Potential for Falls     Patient will remain free of falls Progressing        Prexisting or High Potential for Compromised Skin Integrity     Skin integrity is maintained or improved Progressing        SKIN/TISSUE INTEGRITY - ADULT     Skin integrity remains intact Progressing

## 2018-02-24 NOTE — ASSESSMENT & PLAN NOTE
· Of the back, papular rash, dark red in color, palpable, not blanchable  · 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  · Status post biopsy with dermatologist, pending report  · As per Dermatology apply triamcinolone cream  · Improving with triamcinolone

## 2018-02-24 NOTE — ASSESSMENT & PLAN NOTE
· Unclear if patient was septic upon admission in a different campus, SIRS criteria at this time were met and also patient had acute kidney injury unclear at this time if related to severe sepsis  · Patient continues on antibiotics, completed azithromycin, day 6 of 7 of Rocephin  · Continue with supportive care  · Patient remains afebrile, unreliable monitoring of WBC in settings of suspected acute myeloid leukemia

## 2018-02-24 NOTE — ASSESSMENT & PLAN NOTE
· 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  · Today, hemoglobin 6 8, transfuse 2 units of packed red blood cells  · I have obtained the consent, patient aware of the risk of severe reaction including pulmonary edema, hemolysis, death, infection comes to would like to proceed as part of this plan of care for future chemo Monday

## 2018-02-24 NOTE — PLAN OF CARE
DISCHARGE PLANNING - CARE MANAGEMENT     Discharge to post-acute care or home with appropriate resources Not Progressing        INFECTION - ADULT     Absence or prevention of progression during hospitalization Not Progressing     Absence of fever/infection during neutropenic period Not Progressing

## 2018-02-25 NOTE — PLAN OF CARE
INFECTION - ADULT     Absence or prevention of progression during hospitalization Not Progressing     Absence of fever/infection during neutropenic period Not Progressing

## 2018-02-25 NOTE — ASSESSMENT & PLAN NOTE
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

## 2018-02-25 NOTE — ASSESSMENT & PLAN NOTE
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

## 2018-02-25 NOTE — PROGRESS NOTES
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  · Status post biopsy with dermatologist, pending report  · As per Dermatology apply triamcinolone cream  · Improving with triamcinolone        CAP (community acquired pneumonia)   Assessment & Plan    · Unclear if patient was septic upon admission in a different campus, SIRS criteria at this time were met and also patient had acute kidney injury unclear at this time if related to severe sepsis  · Patient continues on antibiotics, completed azithromycin, day 7 of 7 of Rocephin  · Continue with supportive care  · Patient remains afebrile, unreliable monitoring of WBC in settings of acute myeloid leukemia        MARIA VICTORIA (acute kidney injury) ([facility] [address] )   Assessment & Plan    · Resolved, continue to monitor BMP        Thrombocytopenia ([facility] [address] )   Assessment & Plan    · 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  · Platelets 25, transfuse if below 20        Anemia   Assessment & Plan    · 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  · Patient is status post transfusion on February 24th of 2 units of packed red blood cells, hemoglobin today 8 3, no need for transfusion  · Repeat CBC in the morning and transfuse for hemoglobin less than 7        * AML (acute myeloblastic leukemia) ([facility] [address] )   Assessment & Plan    · Patient presented to other campus for evaluation of pneumonia and elevated white count, his WBC today is 91 5  · As he had abnormal WBC was transferred to our campus for bone marrow biopsy and possible chemotherapy  · Result of biopsy or discussed with Oncology, leukemia non M3 variant  · Continue with Hydrea 1000 mg b i d  for 1 days (total day 3) and chemo with Dacogen tomorrow  · Continues on IV fluids with D5 plus half-normal saline plus 50 mEq of bicarbonate            VTE Pharmacologic Prophylaxis:  No  Pharmacologic: Severe thrombocytopenia  Mechanical VTE Prophylaxis in Place: Yes     Patient Centered Rounds: I have performed bedside rounds with nursing staff today      Discussions with Specialists or Other Care Team Provider:  Oncology     Education and Discussions with Family / Patient:  Patient, daughter Katty Spear, wife Karen De Los Santos, 2 grandchildren at bedside     Time Spent for Care: 45 minutes   More than 50% of total time spent on counseling and coordination of care as described above      Current Length of Stay: 5 day(s)     Current Patient Status: Inpatient   Certification Statement: The patient will continue to require additional inpatient hospital stay due to Refer to above     Discharge Plan:  To be determined     Code Status: Level 3 - DNAR and DNI    Subjective:   Patient states that he is feeling great today  He has no complaints at this point  Objective:     Vitals:   Temp (24hrs), Av 2 °F (36 8 °C), Min:97 5 °F (36 4 °C), Max:99 °F (37 2 °C)    HR:  [76-80] 79  Resp:  [18-21] 20  BP: (104-128)/(63-71) 121/69  SpO2:  [94 %-98 %] 96 %  Body mass index is 23 19 kg/m²  Input and Output Summary (last 24 hours): Intake/Output Summary (Last 24 hours) at 18 1722  Last data filed at 18 1700   Gross per 24 hour   Intake           1792 5 ml   Output             4025 ml   Net          -2232 5 ml       Physical Exam:  I have not examined the patient today, as I am having runny nose and possibly a viral illness, discussed with the patient and family with the wearing a mask to cover my face, distant kept from the patient, patient [address] are 0    Both me and the patient felt that this time as he has no symptoms indeed with for exam to later time    Physical Exam    Additional Data:     Labs:      Results from last 7 days  Lab Units 18  0604   WBC Thousand/uL 56 11*   HEMOGLOBIN g/dL 8 3*   HEMATOCRIT % 22 8*   PLATELETS Thousands/uL 25*   LYMPHO PCT % 28   MONO PCT MAN % 62*   EOSINO PCT MANUAL % 0       Results from last 7 days  Lab Units 02/25/18  0604  02/20/18  0606   SODIUM mmol/L 135*  < > 137   POTASSIUM mmol/L 4 1  < > 3 8   CHLORIDE mmol/L 106  < > 106   CO2 mmol/L 21  < > 20*   BUN mg/dL 18  < > 15   CREATININE mg/dL 1 05  < > 1 31*   CALCIUM mg/dL 7 6*  < > 7 3*   TOTAL PROTEIN g/dL  --   --  6 3*   BILIRUBIN TOTAL mg/dL  --   --  0 66   ALK PHOS U/L  --   --  66   ALT U/L  --   --  24   AST U/L  --   --  37   GLUCOSE RANDOM mg/dL 107  < > 116   < > = values in this interval not displayed  Results from last 7 days  Lab Units 02/20/18  0607   INR  1 39*       * I Have Reviewed All Lab Data Listed Above  * Additional Pertinent Lab Tests Reviewed: All Labs Within Last 24 Hours Reviewed    Imaging:    Imaging Reports Reviewed Today Include:  None  Imaging Personally Reviewed by Myself Includes:  None    Recent Cultures (last 7 days):       Results from last 7 days  Lab Units 02/21/18  0906 02/20/18  1457 02/19/18  1708 02/19/18  1642   BLOOD CULTURE   --   --   --  No Growth After 5 Days  No Growth After 5 Days     SPUTUM CULTURE  3+ Growth of   --   --   --    GRAM STAIN RESULT  1+ Epithelial cells per low power field  Rare Polys  3+ Gram negative rods  1+ Gram positive cocci in pairs  Rare Gram positive rods  --   --   --    INFLUENZA A PCR   --   --  None Detected  --    INFLUENZA B PCR   --   --  None Detected  --    RSV PCR   --   --  None Detected  --    LEGIONELLA URINARY ANTIGEN   --  Negative  --   --        Last 24 Hours Medication List:     Current Facility-Administered Medications:  acetaminophen 650 mg Oral Q6H PRCHARLEY Schmidt MD    acyclovir 400 mg Oral Q12H Philly Doss MD PhD    allopurinol 100 mg Oral Daily Swetha Somers MD    benzonatate 100 mg Oral TID PRN Seth Schmidt MD    cefTRIAXone 1,000 mg Intravenous Q24H [facility] Nalani Gosselin, MD Last Rate: Stopped (02/25/18 1410)   guaiFENesin 600 mg Oral Q12H [facility] Luz Maria Crockett MD    hydroxyurea 1,000 mg Oral Q12H [address] MD    hydrOXYzine HCL 25 mg Oral Q6H PRN Luz Maria Crockett MD    melatonin 3 mg Oral HS Eugune Libman, MD    sodium bicarbonate infusion 50 mL/hr Intravenous Continuous Eugune Libman, MD Last Rate: Stopped (02/24/18 1225)   sodium chloride 50 mL/hr Intravenous Continuous Eugune Libman, MD Last Rate: Stopped (02/24/18 1225)   triamcinolone  Topical BID Breanna Carrillo  Today, Patient Was Seen By: Eugune Libman, MD    ** Please Note: Dragon 360 Dictation voice to text software may have been used in the creation of this document   **

## 2018-02-25 NOTE — ASSESSMENT & PLAN NOTE
· Unclear if patient was septic upon admission in a different campus, SIRS criteria at this time were met and also patient had acute kidney injury unclear at this time if related to severe sepsis  · Patient continues on antibiotics, completed azithromycin, day 7 of 7 of Rocephin  · Continue with supportive care  · Patient remains afebrile, unreliable monitoring of WBC in settings of acute myeloid leukemia

## 2018-02-26 NOTE — PROGRESS NOTES
HPI:   Etta Parra is a 80 y o  male with acute myeloid leukemia and patient is on Hydrea and that is going to be changed to [employer] [facility]  He is responding to Hydrea  WBC count has come down  He has anemia and thrombocytopenia  No bleeding  He is receiving platelet transfusions  No fever and chills  He has tolerated Hydrea without much problem     Has some tiredness  Has arthritic symptoms  He ambulates with a walker          Current Facility-Administered Medications:     acetaminophen (TYLENOL) tablet 650 mg, 650 mg, Oral, Q6H PRN, Jojo Urbina MD, 650 mg at 02/25/18 1057    acyclovir (ZOVIRAX) capsule 400 mg, 400 mg, Oral, Q12H [address], Estelle Olivia MD PhD, 400 mg at 02/26/18 3181    allopurinol (ZYLOPRIM) tablet 100 mg, 100 mg, Oral, Daily, Rhonda Mckay MD, 100 mg at 02/26/18 9915    benzonatate (TESSALON PERLES) capsule 100 mg, 100 mg, Oral, TID PRN, Jojo Urbina MD    cefTRIAXone (ROCEPHIN) IVPB (premix) 1,000 mg, 1,000 mg, Intravenous, Once, Rhonda Mckay MD    guaiFENesin (MUCINEX) 12 hr tablet 600 mg, 600 mg, Oral, Q12H [address], Jojo Urbina MD, 600 mg at 02/26/18 4127    hydrOXYzine HCL (ATARAX) tablet 25 mg, 25 mg, Oral, Q6H PRN, Jojo Urbina MD    melatonin tablet 3 mg, 3 mg, Oral, HS, Rhonda Mckay MD, 3 mg at 02/25/18 2123    sodium bicarbonate 50 mEq in dextrose 5 % 1,000 mL infusion, 50 mL/hr, Intravenous, Continuous, Rhonda Mckay MD, Last Rate: 50 mL/hr at 02/25/18 1723, 50 mL/hr at 02/25/18 1723    sodium chloride infusion 0 45 %, 50 mL/hr, Intravenous, Continuous, Rhonda Mckay MD, Last Rate: 50 mL/hr at 02/25/18 1723, 50 mL/hr at 02/25/18 1723    triamcinolone (KENALOG) 0 1 % cream, , Topical, BID, Michaele Gowers , 1 application at 49/33/44 0106    Allergies   Allergen Reactions    Iodinated Diagnostic Agents      IVP dye       ROS:  02/26/18 Reviewed 13 systems:  Presently no headaches, seizures, dizziness, diplopia, dysphagia, hoarseness, chest pain, palpitations, shortness of breath, cough, hemoptysis, abdominal pain, nausea, vomiting, change in bowel habits, melena, hematuria, fever, chills, bleeding, bone pains, skin rash, weight loss,  numbness, tiredness , weakness, claudication and gait problem  No frequent infections  No hot flashes  Not unusually sensitive to heat or cold  Patient is anxious  /62   Pulse 98   Temp 97 9 °F (36 6 °C)   Resp 20   Ht 5' 10" (1 778 m)   Wt 73 3 kg (161 lb 9 6 oz) Comment: Standing   SpO2 98%   BMI 23 19 kg/m²     Physical Exam:  Vitals:    02/25/18 1539 02/26/18 0747 02/26/18 1044 02/26/18 1046   BP: 121/69 124/66 118/62 118/62   BP Location: Left arm      Pulse: 79 80 98 98   Resp: 20 18 20 20   Temp: 97 8 °F (36 6 °C) 98 8 °F (37 1 °C) 97 9 °F (36 6 °C) 97 9 °F (36 6 °C)   TempSrc: Oral Oral Oral    SpO2: 96% 93% 98%    Weight:       Height:         Alert, oriented, not in distress, no icterus, no oral thrush, no palpable neck mass, clear lung foods, regular heart rate, abdomen  soft and non tender, no palpable abdominal mass, no ascites, no edema of ankles, no calf tenderness, no focal neurological deficit, no skin rash, no palpable lymphadenopathy, good arterial pulses, no clubbing  Patient is anxious  Performance status 2      IMAGING:  IR bone marrow biopsy/aspiration   Final Result by Jan Jonas MD (02/21 3548)   Impression:      Technically successful image guided bone marrow aspiration and core biopsy         Workstation performed: [id number]             Lab Results    Lab Results   Component Value Date    WBC 36 42 (HH) 02/26/2018    HGB 8 0 (L) 02/26/2018    HCT 22 8 (L) 02/26/2018    MCV 90 02/26/2018    PLT 20 (LL) 02/26/2018     Lab Results   Component Value Date     02/26/2018    K 4 2 02/26/2018     02/26/2018    CO2 22 02/26/2018    ANIONGAP 9 02/26/2018    BUN 23 02/26/2018    CREATININE 1 01 02/26/2018    GLUCOSE 106 02/26/2018    CALCIUM 7 7 (L) 02/26/2018    AST 55 (H) 02/26/2018    ALT 33 02/26/2018    ALKPHOS 106 02/26/2018    PROT 6 8 02/26/2018    BILITOT 0 57 02/26/2018    EGFR 67 02/26/2018             Lab Results   Component Value Date    IRON 114 02/21/2018    TIBC 148 (L) 02/21/2018    FERRITIN 1,008 (H) 02/21/2018             Reviewed patient's condition and lab results and discussed with patient and primary physician  Assessment and plan: Jaden James is a 80 y o  male with acute myeloid leukemia with leukocytosis, anemia and thrombocytopenia  Patient is receiving Hydrea  Tomorrow he will start Dacogen  Transfusions as needed clinically  Patient is on allopurinol and acyclovir  Discussed with Dr Mary Jones  Discussed with patient  Discussed with primary physician  Counseling / Coordination of Care  Total floor / unit time spent today 30 minutes  Greater than 50% of total time was spent with the patient and / or family counseling and / or coordination of care

## 2018-02-26 NOTE — ASSESSMENT & PLAN NOTE
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

## 2018-02-26 NOTE — RESTORATIVE TECHNICIAN NOTE
Restorative Specialist Mobility Note       Activity: Ambulate in marin, Ambulate in room, Bathroom privileges, Chair, Dangle, Stand at bedside (Educated/encouraged pt to ambulate with assistance 3-4 x's/day  Bed alarm on   Pt callbell, phone/tray within reach )     Assistive Device: Front wheel walker          [employer] BS, Restorative Technician, [employer]

## 2018-02-26 NOTE — PROGRESS NOTES
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  · Status post biopsy with dermatologist, pending report  · As per Dermatology apply triamcinolone cream  · Improving with triamcinolone  · Patient has a small subcutaneous hematoma without signs infection on the lower left back area where the biopsy was performed, continue to apply warm compresses, at this time improving, continue with clinical monitoring, no need for incision or other surgical intervention at this time        CAP (community acquired pneumonia)   Assessment & Plan    · Unclear if patient was septic upon admission in a different campus, SIRS criteria at this time were met and also patient had acute kidney injury unclear at this time if related to severe sepsis  · Patient continues on antibiotics, completed azithromycin, completed Rocephin today  · Continue with supportive care  · Clinically well, continue with monitoring        MARIA VICTORIA (acute kidney injury) ([address] )   Assessment & Plan    · Resolved, continue to monitor BMP        Thrombocytopenia ([address] )   Assessment & Plan    · 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  · Platelets 20 today, anticipated further drop in the next 24 hours, as patient is supposed to Dacogen tomorrow, will transfuse 1 unit of platelets        Anemia   Assessment & Plan    · 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  · Patient is status post transfusion on February 24th of 2 units of packed red blood cells, hemoglobin today 8 0, no need for transfusion  · Repeat CBC in the morning and transfuse for hemoglobin less than 7        * AML (acute myeloblastic leukemia) ([address] )   Assessment & Plan    · Patient presented to other campus for evaluation of pneumonia and elevated white count, his WBC today is 36 42  · As he had abnormal WBC was transferred to our campus for bone marrow biopsy and possible chemotherapy  · 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  · He will receive last dose of Hydrea today, he will start dacogen tomorrow for induction chemotherapy  · Oncologist follow  · Remains on IV fluids with D5 plus half-normal saline plus 50 mEq of bicarbonate  · ANC yesterday within normal limit  · Remains on prophylactic acyclovir            VTE Pharmacologic Prophylaxis:  No  Pharmacologic: Severe thrombocytopenia  Mechanical VTE Prophylaxis in Place: Yes     Patient Centered Rounds: I have performed bedside rounds with nursing staff today      Discussions with Specialists or Other Care Team Provider:  Oncology     Education and Discussions with Family / Patient:  Patient     Time Spent for Care: 25 minutes   More than 50% of total time spent on counseling and coordination of care as described above      Current Length of Stay: 6 day(s)     Current Patient Status: Inpatient   Certification Statement: The patient will continue to require additional inpatient hospital stay due to Refer to above     Discharge Plan:  To be determined     Code Status: Level 3 - DNAR and DNI    Subjective:   Patient states that he is feeling well  Little bit tired  Objective:     Vitals:   Temp (24hrs), Av 2 °F (36 8 °C), Min:97 8 °F (36 6 °C), Max:98 8 °F (37 1 °C)    HR:  [80-98] 87  Resp:  [18-20] 18  BP: (112-124)/(62-66) 116/62  SpO2:  [93 %-98 %] 96 %  Body mass index is 23 19 kg/m²  Input and Output Summary (last 24 hours): Intake/Output Summary (Last 24 hours) at 18 4989  Last data filed at 18 1601   Gross per 24 hour   Intake             1640 ml   Output             2500 ml   Net             -860 ml       Physical Exam:     Physical Exam   Constitutional: He is oriented to person, place, and time   He appears well-developed  Cardiovascular: Normal rate, regular rhythm and normal heart sounds  Exam reveals no friction rub  No murmur heard  Pulmonary/Chest: Effort normal  No respiratory distress  He has no wheezes  He has no rales  Abdominal: Soft  He exhibits no distension  There is no tenderness  There is no rebound  Musculoskeletal: He exhibits no edema  Neurological: He is alert and oriented to person, place, and time  He exhibits normal muscle tone  Skin: Skin is warm  Small subcutaneous hematoma left lower back with small improved  Leukemic rashes improving    Psychiatric: He has a normal mood and affect  Additional Data:     Labs:      Results from last 7 days  Lab Units 02/26/18  0523   WBC Thousand/uL 36 42*   HEMOGLOBIN g/dL 8 0*   HEMATOCRIT % 22 8*   PLATELETS Thousands/uL 20*   LYMPHO PCT % 12*   MONO PCT MAN % 57*   EOSINO PCT MANUAL % 0       Results from last 7 days  Lab Units 02/26/18  0523   SODIUM mmol/L 137   POTASSIUM mmol/L 4 2   CHLORIDE mmol/L 106   CO2 mmol/L 22   BUN mg/dL 23   CREATININE mg/dL 1 01   CALCIUM mg/dL 7 7*   TOTAL PROTEIN g/dL 6 8   BILIRUBIN TOTAL mg/dL 0 57   ALK PHOS U/L 106   ALT U/L 33   AST U/L 55*   GLUCOSE RANDOM mg/dL 106       Results from last 7 days  Lab Units 02/26/18  0523   INR  1 32*       * I Have Reviewed All Lab Data Listed Above  * Additional Pertinent Lab Tests Reviewed:  All Labs Within Last 24 Hours Reviewed    Imaging:    Imaging Reports Reviewed Today Include: none  Imaging Personally Reviewed by Myself Includes:  none    Recent Cultures (last 7 days):       Results from last 7 days  Lab Units 02/21/18  0906 02/20/18  1457   SPUTUM CULTURE  3+ Growth of   --    GRAM STAIN RESULT  1+ Epithelial cells per low power field  Rare Polys  3+ Gram negative rods  1+ Gram positive cocci in pairs  Rare Gram positive rods  --    LEGIONELLA URINARY ANTIGEN   --  Negative       Last 24 Hours Medication List:     Current Facility-Administered Medications:  acetaminophen 650 mg Oral Q6H PRN Ani Delgado MD    acyclovir 400 mg Oral Q12H Norberto Fothergill, MD PhD    allopurinol 100 mg Oral Daily Emerson Montgomery MD    benzonatate 100 mg Oral TID PRN Ani Delgado MD    guaiFENesin 600 mg Oral Q12H [address] Ani Delgado MD    hydrOXYzine HCL 25 mg Oral Q6H PRN Ani Delgado MD    melatonin 3 mg Oral HS Emerson Montgomery MD    sodium bicarbonate infusion 50 mL/hr Intravenous Continuous Emerson Montgomery MD Last Rate: 50 mL/hr (02/26/18 1741)   sodium chloride 50 mL/hr Intravenous Continuous Emerson Montgomery MD Last Rate: 50 mL/hr (02/25/18 1723)   triamcinolone  Topical BID Ami Leavens  Today, Patient Was Seen By: Emerson Montgomery MD    ** Please Note: Dragon 360 Dictation voice to text software may have been used in the creation of this document   **

## 2018-02-26 NOTE — ASSESSMENT & PLAN NOTE
· Unclear if patient was septic upon admission in a different campus, SIRS criteria at this time were met and also patient had acute kidney injury unclear at this time if related to severe sepsis  · Patient continues on antibiotics, completed azithromycin, completed Rocephin today  · Continue with supportive care  · Clinically well, continue with monitoring

## 2018-02-26 NOTE — SOCIAL WORK
LSW met with patient to provide emotional support  Per patient, he was hopeful for chemotherapy to begin today  He is understanding of the reason for the delay but still frustrated  Patient had many visitors over the weekend because, "I want to see everyone before I have the chemotherapy and am quarantined "  Patient asked that nobody visit him on his first day of treatment (which will hopefully be Tuesday)  He wants to see how he reacts prior to seeing anyone  Patient says that his biggest concern is after starting treatment what his life will look like  Patient would like to be able to drive again and intends to be able to mow his own lawn (has electric start and power perpeled)  LSW explained that these things will depend on his energy level  Also, explained the importance of conserving energy for the important things and allowing others to help with the not so important things

## 2018-02-26 NOTE — ASSESSMENT & PLAN NOTE
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

## 2018-02-26 NOTE — ASSESSMENT & PLAN NOTE
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

## 2018-02-26 NOTE — ASSESSMENT & PLAN NOTE
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

## 2018-02-26 NOTE — PROGRESS NOTES
Progress note - Palliative and Supportive Care   Tone Crane 80 y o  male [id number]    Assessment:  Newly diagnosed AML  Thrombocytopenia  Anemia  Peripheral artery disease  MARIA VICTORIA  Maybe acquired pneumonia  History of squamous cell cancer  History of melanoma    Plan:  1  Symptom management:  Pain:  Asymptomatic  Nausea:  Asymptomatic  Constipation:  Asymptomatic; had BM this morning    2  Goals:  Pursue induction chemotherapy today  Pursue port  Blood transfusion if hemoglobin is less than 7  Platelet transfusions if less than 20,000     Code Status:  DNR DNI - Level 3   Decisional apparatus:  Patient is competent on my exam today  If competence is lost, patient's substitute decision maker would default to wife [place] Act 169  Advance Directive / Living Will / POLST:  ? Interval history:  Patient reports he is nervous to start chemotherapy today  He denies any symptoms of nausea, vomiting, diarrhea, shortness of breath, chest pain  I have told him I will continue to check on him throughout the week to make sure we can keep his symptoms well managed and he seems to be pretty happy about this  He reports having a BM this morning       MEDICATIONS / ALLERGIES:    all current active meds have been reviewed and current meds:   Current Facility-Administered Medications   Medication Dose Route Frequency    acetaminophen (TYLENOL) tablet 650 mg  650 mg Oral Q6H PRN    acyclovir (ZOVIRAX) capsule 400 mg  400 mg Oral Q12H [address]    allopurinol (ZYLOPRIM) tablet 100 mg  100 mg Oral Daily    benzonatate (TESSALON PERLES) capsule 100 mg  100 mg Oral TID PRN    cefTRIAXone (ROCEPHIN) IVPB (premix) 1,000 mg  1,000 mg Intravenous Once    guaiFENesin (MUCINEX) 12 hr tablet 600 mg  600 mg Oral Q12H JAMES    hydrOXYzine HCL (ATARAX) tablet 25 mg  25 mg Oral Q6H PRN    melatonin tablet 3 mg  3 mg Oral HS    sodium bicarbonate 50 mEq in dextrose 5 % 1,000 mL infusion  50 mL/hr Intravenous Continuous    sodium chloride infusion 0 45 %  50 mL/hr Intravenous Continuous    triamcinolone (KENALOG) 0 1 % cream   Topical BID       Allergies   Allergen Reactions    Iodinated Diagnostic Agents      IVP dye       OBJECTIVE:    Physical Exam  Physical Exam   Constitutional: He is oriented to person, place, and time  Vital signs are normal  He appears well-developed and well-nourished  No distress  HENT:   Head: Normocephalic and atraumatic  Eyes: EOM are normal  Pupils are equal, round, and reactive to light  Cardiovascular: Normal rate, regular rhythm and normal heart sounds  No murmur heard  Pulmonary/Chest: Effort normal  No accessory muscle usage  No apnea, no tachypnea and no bradypnea  No respiratory distress  He has no decreased breath sounds  He has no wheezes  He has no rhonchi  Abdominal: Soft  Bowel sounds are normal  He exhibits distension  There is no tenderness  There is no rigidity and no guarding  Neurological: He is alert and oriented to person, place, and time  He is not disoriented  No cranial nerve deficit  GCS eye subscore is 4  GCS verbal subscore is 5  GCS motor subscore is 6  Skin: Skin is warm and dry  He is not diaphoretic  There is pallor  Psychiatric: He has a normal mood and affect  His speech is normal and behavior is normal  Judgment and thought content normal  Cognition and memory are normal        Lab Results:   I have personally reviewed pertinent labs  , CBC:   Lab Results   Component Value Date    WBC 36 42 (HH) 02/26/2018    HGB 8 0 (L) 02/26/2018    HCT 22 8 (L) 02/26/2018    MCV 90 02/26/2018    PLT 20 (LL) 02/26/2018    MCH 31 5 02/26/2018    MCHC 35 1 02/26/2018    RDW 15 1 02/26/2018    MPV 9 2 02/26/2018    NRBC 0 02/26/2018   , CMP:   Lab Results   Component Value Date     02/26/2018    K 4 2 02/26/2018     02/26/2018    CO2 22 02/26/2018    ANIONGAP 9 02/26/2018    BUN 23 02/26/2018    CREATININE 1 01 02/26/2018    GLUCOSE 106 02/26/2018    CALCIUM 7 7 (L) 02/26/2018    AST 55 (H) 02/26/2018    ALT 33 02/26/2018    ALKPHOS 106 02/26/2018    PROT 6 8 02/26/2018    BILITOT 0 57 02/26/2018    EGFR 67 02/26/2018     Imaging Studies:  Reviewed      Counseling / Coordination of Care  Total floor / unit time spent today 25 minutes  Greater than 50% of total time was spent with the patient and / or family counseling and / or coordination of care   A description of the counseling / coordination of care:  See above

## 2018-02-27 NOTE — PROGRESS NOTES
HPI:   Tone Crane is a 80 y o  male with acute myeloid leukemia leukocytosis anemia and thrombocytopenia  No fever, chills or bleeding  Has tiredness  Has arthritic symptoms  Ambulates with a walker  WBC count has come down on Hydrea  He will be starting Dacogen today  Chemotherapy orders are coming from Dr Neeta Gardner  Current Facility-Administered Medications:     acetaminophen (TYLENOL) tablet 650 mg, 650 mg, Oral, Q6H PRN, Freddy Uribe MD, 650 mg at 02/25/18 1057    acyclovir (ZOVIRAX) capsule 400 mg, 400 mg, Oral, Q12H [address], Belkis Vieira MD PhD, 400 mg at 02/27/18 1008    allopurinol (ZYLOPRIM) tablet 100 mg, 100 mg, Oral, Daily, Lauren Haddad MD, 100 mg at 02/27/18 1008    benzonatate (TESSALON PERLES) capsule 100 mg, 100 mg, Oral, TID PRN, Freddy Uribe MD    guaiFENesin [facility]) 12 hr tablet 600 mg, 600 mg, Oral, Q12H [address], Freddy Uribe MD, 600 mg at 02/27/18 1008    hydrOXYzine HCL (ATARAX) tablet 25 mg, 25 mg, Oral, Q6H PRN, Freddy Uribe MD    melatonin tablet 3 mg, 3 mg, Oral, HS, Lauren Haddad MD, 3 mg at 02/26/18 2159    sodium bicarbonate 50 mEq in dextrose 5 % 1,000 mL infusion, 50 mL/hr, Intravenous, Continuous, Lauren Haddad MD, Last Rate: 50 mL/hr at 02/26/18 2000, 50 mL/hr at 02/26/18 2000    sodium chloride infusion 0 45 %, 50 mL/hr, Intravenous, Continuous, Lauren Haddad MD, Last Rate: 50 mL/hr at 02/26/18 2000, 50 mL/hr at 02/26/18 2000    triamcinolone (KENALOG) 0 1 % cream, , Topical, BID, Minerva Rad  Allergies   Allergen Reactions    Iodinated Diagnostic Agents      IVP dye       ROS:  02/27/18 Reviewed 13 systems:  Presently no headaches, seizures, dizziness, diplopia, dysphagia, hoarseness, chest pain, palpitations, shortness of breath, cough, hemoptysis, abdominal pain, nausea, vomiting, change in bowel habits, melena, hematuria, fever, chills, bleeding, bone pains, skin rash, weight loss,  numbness , weakness, claudication    No frequent infections  No hot flashes  Not unusually sensitive to heat or cold  Patient is anxious  No swelling of the ankles  No swollen glands  Other symptoms as in HPI      /70   Pulse 75   Temp 98 5 °F (36 9 °C) (Oral)   Resp 20   Ht 5' 10" (1 778 m)   Wt 73 3 kg (161 lb 9 6 oz) Comment: Standing   SpO2 95%   BMI 23 19 kg/m²      Physical Exam:  Vitals:    02/26/18 1132 02/26/18 1520 02/26/18 2321 02/27/18 0907   BP: 112/66 116/62 103/55 122/70   BP Location:  Left arm Left arm    Pulse: 85 87 81 75   Resp: 20 18 18 20   Temp: 97 8 °F (36 6 °C) 98 5 °F (36 9 °C) 98 8 °F (37 1 °C) 98 5 °F (36 9 °C)   TempSrc: Oral Oral Oral Oral   SpO2: 98% 96% 96% 95%   Weight:       Height:         Alert, oriented, not in distress, no icterus, no oral thrush, no palpable neck mass, clear lung foods, regular heart rate, systolic murmur abdomen  soft and non tender, no palpable abdominal mass, no ascites, no edema of ankles, no calf tenderness, no focal neurological deficit, no skin rash, no palpable lymphadenopathy, good arterial pulses, no clubbing  Patient is anxious  Few small ecchymoses on forearms  Ambulation is with a walker  Performance status 2      IMAGING:  IR bone marrow biopsy/aspiration   Final Result by Johnnie Andres MD (02/21 5955)   Impression:      Technically successful image guided bone marrow aspiration and core biopsy         Workstation performed: [id number]             Lab Results    Lab Results   Component Value Date    WBC 29 95 (H) 02/27/2018    HGB 8 3 (L) 02/27/2018    HCT 23 8 (L) 02/27/2018    MCV 91 02/27/2018    PLT 46 (LL) 02/27/2018     Lab Results   Component Value Date     02/27/2018    K 4 6 02/27/2018     02/27/2018    CO2 21 02/27/2018    ANIONGAP 9 02/27/2018    BUN 16 02/27/2018    CREATININE 1 05 02/27/2018    GLUCOSE 98 02/27/2018    CALCIUM 8 2 (L) 02/27/2018    AST 55 (H) 02/26/2018    ALT 33 02/26/2018    ALKPHOS 106 02/26/2018    PROT 6 8 02/26/2018 BILITOT 0 57 02/26/2018    EGFR 64 02/27/2018           Lab Results   Component Value Date    IRON 114 02/21/2018    TIBC 148 (L) 02/21/2018    FERRITIN 1,008 (H) 02/21/2018             Reviewed and discussed with patient  Assessment and plan: Kareem Hoffmann is a 80 y o  male with acute myeloid leukemia  Patient to start Dacogen  Blood and blood products as needed  Discussed and explained  Questions answered  Discussed acute myeloid leukemia and treatment options at his age, complications secondary to treatment and disease, risks and benefits and overall prognosis   Patient appears to be optimistic and  realistic at the same time  Patient voiced understanding and agreement in the discussion  Counseling / Coordination of Care  Total floor / unit time spent today 40 minutes  Greater than 50% of total time was spent with the patient and / or family counseling and / or coordination of care

## 2018-02-27 NOTE — PROGRESS NOTES
Progress Note - Quan Lisa 1931, 80 y o  male MRN: [id number]    Unit/Bed#: [facility] 630-01 Encounter: [id number]    Primary Care Provider: Ballard Bence, MD   Date and time admitted to hospital: 2018  8:34 PM      MARIA VICTORIA (acute kidney injury) [facility])   Assessment & Plan    · Resolved, continue to monitor BMP        Anemia   Assessment & Plan    · related to acute myeloid leukemia  · PRBC as needed with chemo        * AML (acute myeloblastic leukemia) ([facility] [address] )   Assessment & Plan    Responded to Hydrea and will start Chemo per Hemonc  Monitor cell counts closely            VTE Pharmacologic Prophylaxis: Pharmacologic VTE Prophylaxis contraindicated due to lowe platelets  Mechanical: Mechanical VTE prophylaxis in place  Patient Centered Rounds: I have performed bedside rounds with nursing staff today  Discussions with Specialists or Other Care Team Provider:     Education and Discussions with Family / Patient:     Time Spent for Care: More than 50% of total time spent on counseling and coordination of care as described above  Current Length of Stay: 7 day(s)    Current Patient Status: Inpatient   Certification Statement: The patient will continue to require additional inpatient hospital stay due to all above    Discharge Plan:    Code Status: Level 3 - DNAR and DNI      Subjective: no complaints    Objective:     Vitals:   Temp (24hrs), Av 4 °F (36 9 °C), Min:97 8 °F (36 6 °C), Max:98 8 °F (37 1 °C)    HR:  [75-90] 90  Resp:  [18-20] 20  BP: (103-123)/(55-70) 123/60  SpO2:  [95 %-96 %] 95 %  Body mass index is 23 41 kg/m²  Input and Output Summary (last 24 hours): Intake/Output Summary (Last 24 hours) at 18 1717  Last data filed at 18 1430   Gross per 24 hour   Intake          3780 83 ml   Output             2175 ml   Net          1605 83 ml       Physical Exam   HENT:   Head: Normocephalic  Cardiovascular: Normal heart sounds      Pulmonary/Chest: Effort normal  Abdominal: Soft  Neurological: He is alert  Skin: Skin is warm  Additional Data:     Labs:      Results from last 7 days  Lab Units 02/27/18  0607   WBC Thousand/uL 29 95*   HEMOGLOBIN g/dL 8 3*   HEMATOCRIT % 23 8*   PLATELETS Thousands/uL 46*   LYMPHO PCT % 15   MONO PCT MAN % 72*   EOSINO PCT MANUAL % 0       Results from last 7 days  Lab Units 02/27/18  0607 02/26/18  0523   SODIUM mmol/L 137 137   POTASSIUM mmol/L 4 6 4 2   CHLORIDE mmol/L 107 106   CO2 mmol/L 21 22   BUN mg/dL 16 23   CREATININE mg/dL 1 05 1 01   CALCIUM mg/dL 8 2* 7 7*   TOTAL PROTEIN g/dL  --  6 8   BILIRUBIN TOTAL mg/dL  --  0 57   ALK PHOS U/L  --  106   ALT U/L  --  33   AST U/L  --  55*   GLUCOSE RANDOM mg/dL 98 106       Results from last 7 days  Lab Units 02/26/18  0523   INR  1 32*       * I Have Reviewed All Lab Data Listed Above  Imaging:  Imaging Personally Reviewed by Myself Includes:     Cultures:   Blood Culture:   Lab Results   Component Value Date    BLOODCX No Growth After 5 Days  02/19/2018    BLOODCX No Growth After 5 Days   02/19/2018     Urine Culture: No results found for: URINECX  Sputum Culture: No components found for: SPUTUMCX  Wound Culture: No results found for: WOUNDCULT    Last 24 Hours Medication List:     Current Facility-Administered Medications:  acetaminophen 650 mg Oral Q6H PRN Jessica Burgess MD    acyclovir 400 mg Oral Q12H [facility] Apollo Stewart MD PhD    allopurinol 100 mg Oral Daily Tiffany Salomon MD    benzonatate 100 mg Oral TID PRN Jessica Burgess MD    decitabine (DACOGEN) IVPB 38 mg Intravenous Once [facility] 2/28/2018] decitabine (DACOGEN) IVPB 38 mg Intravenous Daily Piper Copeland MD    guaiFENesin 600 mg Oral Q12H [facility] Jessica Burgess MD    hydrOXYzine HCL 25 mg Oral Q6H PRN Jessica Burgess MD    melatonin 3 mg Oral HS MD Wyatt Prabhakar ON 2/28/2018] ondansetron (ZOFRAN) IVPB (ONC use only) 16 mg Intravenous Daily Piper Copeland MD    sodium bicarbonate infusion 50 mL/hr Intravenous Continuous Margaret Cary MD Last Rate: Stopped (02/27/18 1636)   sodium chloride 50 mL/hr Intravenous Continuous Margaret Cary MD Last Rate: Stopped (02/27/18 1637)   triamcinolone  Topical BID Abel Douglas  Today, Patient Was Seen By: John Paul Jean Baptiste MD    ** Please Note: Dragon 360 Dictation voice to text software may have been used in the creation of this document   **

## 2018-02-27 NOTE — PROGRESS NOTES
Progress note - Palliative and Supportive Care   Kaila Cord 80 y o  male [id number]    Assessment:  Newly diagnosed AML  Thrombocytopenia  Anemia  Peripheral artery disease  MARIA VICTORIA  Maybe acquired pneumonia  History of squamous cell cancer  History of melanoma    Plan:  1  Symptom management: asymptomatic     2  Goals:  Start chemo today; platelets were too low to start yesterday     Code Status: DNR/ DNI - Level 3   Decisional apparatus:  Patient is competent on my exam today  If competence is lost, patient's substitute decision maker would default to wife [place] Act 169  Advance Directive / Living Will / POLST: ? Interval history:  Patient required platelet transfusions yesterday  He asked his family not to come see him on the first day of chemo, so his family did not come by yesterday  The plan is to start chemo today  The patient expressed his concern to our  that he does not know what his prognosis looks like going forward  He is very independent and would like to continue to mow his lawn, ect  He is worried about his life going forward and if he will be able to fish and drive his cars around  Dr Saranya Avalos walked in the room and told the patient that he will feel very weak and tired and therefore driving will not be a good idea  He once again reiterated that while this treats his disease, this does not cure it  Patient understands this and just wants a better idea of what his life will look  [place] Dad expressed that the adverse effects to look for include infections and bleeding  Therefore, the patient will need transfusions to keep his platelets at a good level  The patient was grateful for the information  While he tells me he is an optimist, he prefers to be prepared for what is coming and what to expect going forward      MEDICATIONS / ALLERGIES:    all current active meds have been reviewed and current meds:   Current Facility-Administered Medications   Medication Dose Route Frequency    acetaminophen (TYLENOL) tablet 650 mg  650 mg Oral Q6H PRN    acyclovir (ZOVIRAX) capsule 400 mg  400 mg Oral Q12H [facility]    allopurinol (ZYLOPRIM) tablet 100 mg  100 mg Oral Daily    benzonatate (TESSALON PERLES) capsule 100 mg  100 mg Oral TID PRN    guaiFENesin (MUCINEX) 12 hr tablet 600 mg  600 mg Oral Q12H JAMES    hydrOXYzine HCL (ATARAX) tablet 25 mg  25 mg Oral Q6H PRN    melatonin tablet 3 mg  3 mg Oral HS    sodium bicarbonate 50 mEq in dextrose 5 % 1,000 mL infusion  50 mL/hr Intravenous Continuous    sodium chloride infusion 0 45 %  50 mL/hr Intravenous Continuous    triamcinolone (KENALOG) 0 1 % cream   Topical BID       Allergies   Allergen Reactions    Iodinated Diagnostic Agents      IVP dye       OBJECTIVE:    Physical Exam  Physical Exam   Constitutional: He is oriented to person, place, and time  Vital signs are normal  He appears well-developed and well-nourished  He is cooperative  No distress  HENT:   Head: Normocephalic and atraumatic  Not macrocephalic and not microcephalic  Head is without raccoon's eyes and without Strauss's sign  Eyes: Lids are normal  Right conjunctiva is not injected  Right conjunctiva has no hemorrhage  Left conjunctiva is not injected  Left conjunctiva has no hemorrhage  No scleral icterus  Neck: Normal range of motion  Cardiovascular: Normal rate and regular rhythm  No murmur heard  Pulmonary/Chest: Effort normal and breath sounds normal  No accessory muscle usage  No apnea, no tachypnea and no bradypnea  No respiratory distress  He has no decreased breath sounds  He has no wheezes  He has no rhonchi  Abdominal: Soft  He exhibits distension  There is no tenderness  There is no rigidity and no guarding  Musculoskeletal: Normal range of motion  He exhibits no edema  Neurological: He is alert and oriented to person, place, and time  He is not disoriented  No cranial nerve deficit  GCS eye subscore is 4  GCS verbal subscore is 5   GCS motor subscore is 6  Skin: Skin is warm and dry  He is not diaphoretic  No pallor  Psychiatric: He has a normal mood and affect  His speech is normal and behavior is normal  Thought content normal  Cognition and memory are normal        Lab Results:   I have personally reviewed pertinent labs  , CBC:   Lab Results   Component Value Date    WBC 29 95 (H) 02/27/2018    HGB 8 3 (L) 02/27/2018    HCT 23 8 (L) 02/27/2018    MCV 91 02/27/2018    PLT 46 (LL) 02/27/2018    MCH 31 7 02/27/2018    MCHC 34 9 02/27/2018    RDW 14 7 02/27/2018    MPV 10 0 02/27/2018    NRBC 0 02/27/2018   , CMP:   Lab Results   Component Value Date     02/27/2018    K 4 6 02/27/2018     02/27/2018    CO2 21 02/27/2018    ANIONGAP 9 02/27/2018    BUN 16 02/27/2018    CREATININE 1 05 02/27/2018    GLUCOSE 98 02/27/2018    CALCIUM 8 2 (L) 02/27/2018    EGFR 64 02/27/2018     Imaging Studies: none new  EKG, Pathology, and Other Studies: none new    Counseling / Coordination of Care  Total floor / unit time spent today 30 minutes  Greater than 50% of total time was spent with the patient and / or family counseling and / or coordination of care  A description of the counseling / coordination of care: met with patient, reviewed chart and discussed case with Heme Onc in the room with the patient

## 2018-02-28 NOTE — ASSESSMENT & PLAN NOTE
Responded to Hydrea and Chemo per Hemonc  ANC 0  Monitor closely  If there is fever - add cefepime and consult ID

## 2018-02-28 NOTE — PROGRESS NOTES
HPI:   Geoffrey Conteh is a 80 y o  male with acute myeloid leukemia with leukocytosis, anemia and thrombocytopenia  Patient has responded to oral Hydrea and now he is on Dacogen and is tolerating that without much problem  Has some tiredness, arthritic symptoms, no fever, chills or bleeding, no GI symptoms secondary to Dacogen  Current Facility-Administered Medications:     acetaminophen (TYLENOL) tablet 650 mg, 650 mg, Oral, Q6H PRN, Angus Blunt MD, 650 mg at 02/25/18 1057    acyclovir (ZOVIRAX) capsule 400 mg, 400 mg, Oral, Q12H [address], Mick Nowak MD PhD, 400 mg at 02/28/18 1020    allopurinol (ZYLOPRIM) tablet 100 mg, 100 mg, Oral, Daily, Edda Martel MD, 100 mg at 02/28/18 1020    benzonatate (TESSALON PERLES) capsule 100 mg, 100 mg, Oral, TID PRN, Angus Blunt MD    decitabine (DACOGEN) 38 mg in sodium chloride 0 9 % 100 mL IVPB, 38 mg, Intravenous, Daily, Clifford Parks MD    [facility]) 12 hr tablet 600 mg, 600 mg, Oral, Q12H [address], Angus Blunt MD, 600 mg at 02/28/18 1020    hydrOXYzine HCL (ATARAX) tablet 25 mg, 25 mg, Oral, Q6H PRN, Angus Blunt MD    melatonin tablet 3 mg, 3 mg, Oral, HS, Edda Martel MD, 3 mg at 02/27/18 2125    ondansetron (ZOFRAN) 16 mg in sodium chloride 0 9 % 50 mL IVPB, 16 mg, Intravenous, Daily, Clifford Parks MD    sodium chloride infusion 0 45 %, 50 mL/hr, Intravenous, Continuous, Edda Martel MD, Stopped at 02/27/18 1637    triamcinolone (KENALOG) 0 1 % cream, , Topical, BID, Lujean Huber  Allergies   Allergen Reactions    Iodinated Diagnostic Agents      IVP dye       ROS:  02/28/18 Reviewed 13 systems:  Presently no headaches, seizures, dizziness, diplopia, dysphagia, hoarseness, chest pain, palpitations, shortness of breath, cough, hemoptysis, abdominal pain, nausea, vomiting, change in bowel habits, melena, hematuria, fever, chills, bleeding, bone pains, skin rash, weight loss,  numbness,  weakness, claudication    No frequent infections  No hot flashes  Not unusually sensitive to heat or cold  Patient is anxious  No swollen glands  No swelling of the ankles  /80   Pulse 91   Temp 99 3 °F (37 4 °C) (Oral)   Resp 18   Ht 5' 10" (1 778 m)   Wt 74 kg (163 lb 2 3 oz)   SpO2 94%   BMI 23 41 kg/m²     Physical Exam:  Vitals:    02/27/18 1417 02/27/18 1537 02/27/18 2300 02/28/18 0700   BP:  123/60 139/74 129/80   BP Location:  Left arm Left arm    Pulse:  90 75 91   Resp:  20 20 18   Temp:  97 8 °F (36 6 °C) 98 7 °F (37 1 °C) 99 3 °F (37 4 °C)   TempSrc:  Oral Oral Oral   SpO2:  95% 95% 94%   Weight: 74 kg (163 lb 2 3 oz)      Height: 5' 10" (1 778 m)        Alert, oriented, not in distress, no icterus, no oral thrush, no palpable neck mass, clear lung foods, regular heart rate, abdomen  soft and non tender, no palpable abdominal mass, no ascites, no edema of ankles, no calf tenderness, no focal neurological deficit, no skin rash, no palpable lymphadenopathy, good arterial pulses, no clubbing  Few small ecchymoses on the forearms  Patient is anxious  Performance status 2  He ambulates with a walker       IMAGING:  IR bone marrow biopsy/aspiration   Final Result by Franco Morgna MD (02/21 9289)   Impression:      Technically successful image guided bone marrow aspiration and core biopsy         Workstation performed: [id number]             Lab Results      Results from last 7 days  Lab Units 02/28/18  0606 02/27/18  2606 02/26/18  5423 02/25/18  0604 02/24/18  2155 02/24/18  0521 02/23/18  1706 02/23/18  0604  02/22/18  0518   WBC Thousand/uL 27 84* 29 95* 36 42* 56 11* 90 45* 106 65* 123 30* 102 69*  < > 76 61*   HEMOGLOBIN g/dL 7 4* 8 3* 8 0* 8 3* 8 3* 6 6* 7 0* 7 1*  < > 7 1*   HEMATOCRIT % 21 5* 23 8* 22 8* 22 8* 23 4* 18 7* 20 5* 20 2*  < > 19 7*   PLATELETS Thousands/uL 31* 46* 20* 25* 27* 31* 31* 32*  < > 25*   SODIUM mmol/L 136 137 137 135*  --  139  --  134*  --  139   POTASSIUM mmol/L 4 3 4 6 4 2 4 1  -- 4 0  --  3 9  --  4 0   CHLORIDE mmol/L 105 107 106 106  --  107  --  106  --  109*   CO2 mmol/L 24 21 22 21  --  22  --  21  --  21   BUN mg/dL 16 16 23 18  --  12  --  12  --  14   CREATININE mg/dL 1 10 1 05 1 01 1 05  --  1 10  --  1 10  --  0 96   CALCIUM mg/dL 8 1* 8 2* 7 7* 7 6*  --  7 2*  --  7 4*  --  7 2*   MAGNESIUM mg/dL  --  2 3 2 2 2 2  --  2 1  --  2 0  --  2 2   PHOSPHORUS mg/dL  --  3 8 4 2* 3 8  --  3 2  --  1 6*  --  2 5   TOTAL PROTEIN g/dL  --   --  6 8  --   --   --   --   --   --   --    GLUCOSE RANDOM mg/dL 96 98 106 107  --  105  --  106  --  95   < > = values in this interval not displayed  Reviewed and discussed with patient  Assessment and plan: Stephanie Burnette is a 80 y o  male with acute myeloid leukemia  Dacogen  Transfusions  Discussed with patient and explained  Discussed with primary physician  Patient voiced understanding and agreement in the discussion  Counseling / Coordination of Care    Greater than 50% of total time was spent with the patient and / or family counseling and / or coordination of care

## 2018-02-28 NOTE — PROGRESS NOTES
Progress Note - [facility] 1931, 80 y o  male MRN: [id number]    [facility] Encounter: [id number]    Primary Care Provider: Isabella Drake MD   Date and time admitted to hospital: 2018  8:34 PM        * AML (acute myeloblastic leukemia) ([facility] [address] )   Assessment & Plan    Responded to Hydrea and Chemo per Hemonc  ANC 0  Monitor closely  If there is fever - add cefepime and consult ID          CAP (community acquired pneumonia)   Assessment & Plan    Treated successfully with clinical improvement        Anemia   Assessment & Plan    Symptomatic Anemia - PRBC today  Monitor cbc daily          VTE Pharmacologic Prophylaxis: Pharmacologic VTE Prophylaxis contraindicated due to low platelets  Mechanical: Mechanical VTE prophylaxis in place  Patient Centered Rounds: I have performed bedside rounds with nursing staff today  Discussions with Specialists or Other Care Team Provider:     Education and Discussions with Family / Patient:     Time Spent for Care: More than 50% of total time spent on counseling and coordination of care as described above  Current Length of Stay: 8 day(s)    Current Patient Status: Inpatient   Certification Statement: The patient will continue to require additional inpatient hospital stay due to as above    Discharge Plan:    Code Status: Level 3 - DNAR and DNI      Subjective: no complaints offered    Objective:     Vitals:   Temp (24hrs), Av 8 °F (37 1 °C), Min:98 5 °F (36 9 °C), Max:99 3 °F (37 4 °C)    HR:  [75-91] 82  Resp:  [18-20] 18  BP: (103-139)/(58-80) 108/59  SpO2:  [94 %-95 %] 95 %  Body mass index is 23 41 kg/m²  Input and Output Summary (last 24 hours): Intake/Output Summary (Last 24 hours) at 18 1604  Last data filed at 18 1459   Gross per 24 hour   Intake             1360 ml   Output             2900 ml   Net            -1540 ml       Physical Exam   Constitutional: He is oriented to person, place, and time     HENT: Head: Normocephalic  Cardiovascular: Normal heart sounds  Pulmonary/Chest: Effort normal    Abdominal: Soft  Bowel sounds are normal    Neurological: He is alert and oriented to person, place, and time  Skin: No rash noted  Additional Data:     Labs:      Results from last 7 days  Lab Units 02/28/18  0606   WBC Thousand/uL 27 84*   HEMOGLOBIN g/dL 7 4*   HEMATOCRIT % 21 5*   PLATELETS Thousands/uL 31*   LYMPHO PCT % 12*   MONO PCT MAN % 60*   EOSINO PCT MANUAL % 0       Results from last 7 days  Lab Units 02/28/18  0606  02/26/18  0523   SODIUM mmol/L 136  < > 137   POTASSIUM mmol/L 4 3  < > 4 2   CHLORIDE mmol/L 105  < > 106   CO2 mmol/L 24  < > 22   BUN mg/dL 16  < > 23   CREATININE mg/dL 1 10  < > 1 01   CALCIUM mg/dL 8 1*  < > 7 7*   TOTAL PROTEIN g/dL  --   --  6 8   BILIRUBIN TOTAL mg/dL  --   --  0 57   ALK PHOS U/L  --   --  106   ALT U/L  --   --  33   AST U/L  --   --  55*   GLUCOSE RANDOM mg/dL 96  < > 106   < > = values in this interval not displayed  Results from last 7 days  Lab Units 02/26/18  0523   INR  1 32*       * I Have Reviewed All Lab Data Listed Above  Imaging:  Imaging Personally Reviewed by Myself Includes:     Cultures:   Blood Culture:   Lab Results   Component Value Date    BLOODCX No Growth After 5 Days  02/19/2018    BLOODCX No Growth After 5 Days   02/19/2018     Urine Culture: No results found for: URINECX  Sputum Culture: No components found for: SPUTUMCX  Wound Culture: No results found for: WOUNDCULT    Last 24 Hours Medication List:     Current Facility-Administered Medications:  acetaminophen 650 mg Oral Q6H PRN Carlos Nayak MD    acyclovir 400 mg Oral Q12H [address] Augustine Higginbotham MD PhD    allopurinol 100 mg Oral Daily Abran Grande MD    benzonatate 100 mg Oral TID PRN Carlos Nayak MD    decitabine (DACOGEN) IVPB 38 mg Intravenous Daily Valerie Alatorre MD    guaiFENesin 600 mg Oral Q12H [address] Carlos Nayak MD    hydrOXYzine HCL 25 mg Oral Q6H PRN Carlos Nayak MD melatonin 3 mg Oral HS Zuleima Thakkar MD    ondansetron (ZOFRAN) IVPB (ONC use only) 16 mg Intravenous Daily Rupert Chapman MD    sodium chloride 50 mL/hr Intravenous Continuous Zuleima Thakkar MD Last Rate: Stopped (02/27/18 2886)   triamcinolone  Topical BID Dora Randhawa  Today, Patient Was Seen By: Zoey Chun MD    ** Please Note: Dragon 360 Dictation voice to text software may have been used in the creation of this document   **

## 2018-02-28 NOTE — PROGRESS NOTES
Progress note - Palliative and Supportive Care   Jannie Harley 80 y o  male [id number]    Assessment:  Newly diagnosed AML  Thrombocytopenia  Anemia  Peripheral artery disease  MARIA VICTORIA  Maybe acquired pneumonia  History of squamous cell cancer  History of melanoma    Plan:  1  Symptom management: asymptomatic      2  Goals:  - disease directed goals  - transfuse 1 unit of blood today, per Heme Onc  - started chemo yesterday       Code Status: DNR/ DNI - Level 3   Decisional apparatus:  Patient is competent on my exam today  If competence is lost, patient's substitute decision maker would default to wife [place]  Advance Directive / Living Will / POLST:  ? Interval history:  Patient appears to be doing well  He is surprised by how well he feels  We were able to walk together to the door of the ICU and back without having to take a break  He denies any shortness of breath, nausea, CP, abd pain, diarrhea      MEDICATIONS / ALLERGIES:    all current active meds have been reviewed and current meds:   Current Facility-Administered Medications   Medication Dose Route Frequency    acetaminophen (TYLENOL) tablet 650 mg  650 mg Oral Q6H PRN    acyclovir (ZOVIRAX) capsule 400 mg  400 mg Oral Q12H [address]    allopurinol (ZYLOPRIM) tablet 100 mg  100 mg Oral Daily    benzonatate (TESSALON PERLES) capsule 100 mg  100 mg Oral TID PRN    decitabine (DACOGEN) 38 mg in sodium chloride 0 9 % 100 mL IVPB  38 mg Intravenous Daily    guaiFENesin (MUCINEX) 12 hr tablet 600 mg  600 mg Oral Q12H JAMES    hydrOXYzine HCL (ATARAX) tablet 25 mg  25 mg Oral Q6H PRN    melatonin tablet 3 mg  3 mg Oral HS    ondansetron (ZOFRAN) 16 mg in sodium chloride 0 9 % 50 mL IVPB  16 mg Intravenous Daily    sodium chloride infusion 0 45 %  50 mL/hr Intravenous Continuous    triamcinolone (KENALOG) 0 1 % cream   Topical BID       Allergies   Allergen Reactions    Iodinated Diagnostic Agents      IVP dye       OBJECTIVE:    Physical Exam  Physical Exam   Constitutional: He is oriented to person, place, and time  Vital signs are normal  He appears well-developed and well-nourished  He is cooperative  Non-toxic appearance  He does not have a sickly appearance  He does not appear ill  No distress  He is not intubated  HENT:   Head: Normocephalic and atraumatic  Not macrocephalic and not microcephalic  Head is without raccoon's eyes and without Strauss's sign  Right Ear: External ear normal    Left Ear: External ear normal    Eyes: EOM and lids are normal  Right eye exhibits no discharge  Left eye exhibits no discharge  Right conjunctiva is not injected  Right conjunctiva has no hemorrhage  Left conjunctiva is not injected  Left conjunctiva has no hemorrhage  No scleral icterus  Cardiovascular: Normal rate, regular rhythm, S1 normal, S2 normal and normal heart sounds  Pulmonary/Chest: Effort normal  No apnea, no tachypnea and no bradypnea  He is not intubated  No respiratory distress  He has no decreased breath sounds  Abdominal: Soft  There is no tenderness  There is no rigidity and no guarding  Neurological: He is alert and oriented to person, place, and time  He is not disoriented  No cranial nerve deficit  GCS eye subscore is 4  GCS verbal subscore is 5  GCS motor subscore is 6  Skin: Skin is warm and dry  He is not diaphoretic  Psychiatric: He has a normal mood and affect  His speech is normal and behavior is normal  Judgment and thought content normal  Cognition and memory are normal        Lab Results:   I have personally reviewed pertinent labs  , CBC:   Lab Results   Component Value Date    WBC 27 84 (H) 02/28/2018    HGB 7 4 (L) 02/28/2018    HCT 21 5 (L) 02/28/2018    MCV 91 02/28/2018    PLT 31 (LL) 02/28/2018    MCH 31 4 02/28/2018    MCHC 34 4 02/28/2018    RDW 14 6 02/28/2018    MPV 10 1 02/28/2018    NRBC 0 02/28/2018   , CMP:   Lab Results   Component Value Date     02/28/2018    K 4 3 02/28/2018     02/28/2018    CO2 24 02/28/2018    ANIONGAP 7 02/28/2018    BUN 16 02/28/2018    CREATININE 1 10 02/28/2018    GLUCOSE 96 02/28/2018    CALCIUM 8 1 (L) 02/28/2018    EGFR 60 02/28/2018     Imaging Studies: none new  EKG, Pathology, and Other Studies: none new    Counseling / Coordination of Care  Total floor / unit time spent today 25 minutes  Greater than 50% of total time was spent with the patient and / or family counseling and / or coordination of care   A description of the counseling / coordination of care: see above

## 2018-02-28 NOTE — SOCIAL WORK
DEMETRIUSW stopped to check on patient  Patient is in good spirits  He states, "on to my second day of chemo and things are looking good  I'm feeling good "  Patient is very thankful for the "amazing care and food "  He states that he feels that the group of doctors are working very hard and well together to ensure his best treatment and outcome  He has nothing but positive remarks regarding Teresa Chung  Patient thinks that his wife will be visiting tomorrow evening  LSW asks for him to let me know when she visits so that I can offer to provide her with emotional support  JIM asked for the patient to explain to me the conversation that he had with Dr Arabella Kendrick and Dr Valentino Karst  Per patient, "i am hopeful for the outcome because the type of cancer I have is treatable, unlike the other 4/5 types that are not treatable "  Patient does understand that he is scheduled for 5 days of treatment  Per patient, after that 5 days he could stay in the hospital up to a month before he feels well enough to return home  LSW will reach out to treating physicians to verify this information

## 2018-03-01 PROBLEM — R50.81 FEBRILE NEUTROPENIA (HCC): Status: ACTIVE | Noted: 2018-01-01

## 2018-03-01 PROBLEM — D70.9 FEBRILE NEUTROPENIA (HCC): Status: ACTIVE | Noted: 2018-01-01

## 2018-03-01 NOTE — ASSESSMENT & PLAN NOTE
Responded to Hydrea and Chemo per Hemonc  ANC 0  Monitor - developed 100 4 temp and  Also c/o sore throat and cellulitis at the skin Bx site  Added Cefepime and Vancmycin  ID consulted

## 2018-03-01 NOTE — PROGRESS NOTES
Progress Note - Bettie Roth 1931, 80 y o  male MRN: [id number]    Unit/Bed#: [facility] 630-01 Encounter: [id number]    Primary Care Provider: Padilla Brewster MD   Date and time admitted to hospital: 2018  8:34 PM    * AML (acute myeloblastic leukemia) ([facility] [address] )   Assessment & Plan    Responded to Hydrea and Chemo per Hemonc  ANC 0  Monitor - developed 100 4 temp and  Also c/o sore throat and cellulitis at the skin Bx site  Added Cefepime and Vancmycin  ID consulted          Febrile neutropenia (HCC)   Assessment & Plan    D/w ID  US neck for rt sided lump and tenderness  Has contrast allergy - may need prep before imaging        Thrombocytopenia (HCC)   Assessment & Plan    Platelet transfusion as needed          VTE Pharmacologic Prophylaxis: Pharmacologic VTE Prophylaxis contraindicated due to low platelets  Mechanical: Mechanical VTE prophylaxis in place  Patient Centered Rounds: I have performed bedside rounds with nursing staff today  Discussions with Specialists or Other Care Team Provider:     Education and Discussions with Family / Patient:     Time Spent for Care: More than 50% of total time spent on counseling and coordination of care as described above  Current Length of Stay: 9 day(s)    Current Patient Status: Inpatient   Certification Statement: The patient will continue to require additional inpatient hospital stay due to all above    Discharge Plan:    Code Status: Level 3 - DNAR and DNI      Subjective: sore throat  Objective:     Vitals:   Temp (24hrs), Av 2 °F (37 3 °C), Min:98 3 °F (36 8 °C), Max:100 4 °F (38 °C)    HR:  [82-94] 94  Resp:  [18-20] 18  BP: (122-126)/(59-65) 122/59  SpO2:  [94 %-96 %] 96 %  Body mass index is 23 41 kg/m²  Input and Output Summary (last 24 hours):        Intake/Output Summary (Last 24 hours) at 18 1655  Last data filed at 18 1412   Gross per 24 hour   Intake          1738 43 ml   Output             2475 ml   Net          -736 57 ml       Physical Exam   HENT:   Head: Normocephalic  Eyes: Pupils are equal, round, and reactive to light  Cardiovascular: Normal heart sounds  Pulmonary/Chest: Effort normal    Abdominal: Soft  Neurological: He is alert  Skin: There is pallor  Lt back rtedness swelling and induration         Additional Data:     Labs:      Results from last 7 days  Lab Units 03/01/18  0643   WBC Thousand/uL 30 57*   HEMOGLOBIN g/dL 8 3*   HEMATOCRIT % 23 6*   PLATELETS Thousands/uL 27*   LYMPHO PCT % 21   MONO PCT MAN % 25*   EOSINO PCT MANUAL % 0       Results from last 7 days  Lab Units 03/01/18  0643  02/26/18  0523   SODIUM mmol/L 135*  < > 137   POTASSIUM mmol/L 4 3  < > 4 2   CHLORIDE mmol/L 104  < > 106   CO2 mmol/L 23  < > 22   BUN mg/dL 16  < > 23   CREATININE mg/dL 1 13  < > 1 01   CALCIUM mg/dL 8 0*  < > 7 7*   TOTAL PROTEIN g/dL  --   --  6 8   BILIRUBIN TOTAL mg/dL  --   --  0 57   ALK PHOS U/L  --   --  106   ALT U/L  --   --  33   AST U/L  --   --  55*   GLUCOSE RANDOM mg/dL 97  < > 106   < > = values in this interval not displayed  Results from last 7 days  Lab Units 02/26/18  0523   INR  1 32*       * I Have Reviewed All Lab Data Listed Above  Imaging:  Imaging Personally Reviewed by Myself Includes:     Cultures:   Blood Culture:   Lab Results   Component Value Date    BLOODCX No Growth After 5 Days  02/19/2018    BLOODCX No Growth After 5 Days   02/19/2018     Urine Culture: No results found for: URINECX  Sputum Culture: No components found for: SPUTUMCX  Wound Culture: No results found for: WOUNDCULT    Last 24 Hours Medication List:     Current Facility-Administered Medications:  acetaminophen 650 mg Oral Q6H PRN Thom Cole MD    acyclovir 400 mg Oral Q12H [address] Jessica Whaley MD PhD    allopurinol 100 mg Oral Daily Mauri Huggins MD    benzonatate 100 mg Oral TID PRN Thom Cole MD    [START ON 3/2/2018] cefepime 2,000 mg Intravenous Q24H Anabel Aldea, DO    decitabine (DACOGEN) IVPB 38 mg Intravenous Daily Roque Andrews MD Last Rate: Stopped (02/28/18 1924)   guaiFENesin 600 mg Oral Q12H [facility] Abner Sherman MD    hydrOXYzine HCL 25 mg Oral Q6H PRN Abner Sherman MD    melatonin 3 mg Oral HS Mick Steward MD    ondansetron (ZOFRAN) IVPB (ONC use only) 16 mg Intravenous Daily Roque Andrews MD Last Rate: 16 mg (02/28/18 1706)   sodium chloride 50 mL/hr Intravenous Continuous Mick Steward MD Last Rate: 50 mL/hr (03/01/18 0912)   triamcinolone  Topical BID [place] Paulo Ho      vancomycin 10 mg/kg Intravenous Q12H Anthony Bose MD Last Rate: 750 mg (03/01/18 1154)        Today, Patient Was Seen By: Anthony Bose MD    ** Please Note: Dragon 360 Dictation voice to text software may have been used in the creation of this document   **

## 2018-03-01 NOTE — PROGRESS NOTES
HPI:   Fernie Pa is a 80 y o  male with acute myeloid leukemia and patient is on Dacogen  Patient's family, wife daughter and grandson are here  No fever, chills or bleeding  He has started to have some soreness of throat on the right and he sounds nasal   Has tiredness  Has arthritic symptoms  Few small ecchymoses on forearms  Current Facility-Administered Medications:     acetaminophen (TYLENOL) tablet 650 mg, 650 mg, Oral, Q6H PRN, Abner Sherman MD, 650 mg at 02/25/18 1057    acyclovir (ZOVIRAX) capsule 400 mg, 400 mg, Oral, Q12H [address], Hina Coleman MD PhD, 400 mg at 03/01/18 0907    allopurinol (ZYLOPRIM) tablet 100 mg, 100 mg, Oral, Daily, Mick Steward MD, 100 mg at 03/01/18 0907    benzonatate (TESSALON PERLES) capsule 100 mg, 100 mg, Oral, TID PRN, Abner Sherman MD    decitabine (DACOGEN) 38 mg in sodium chloride 0 9 % 100 mL IVPB, 38 mg, Intravenous, Daily, Roque Andrews MD, Stopped at 02/28/18 1924    guaiFENesin (MUCINEX) 12 hr tablet 600 mg, 600 mg, Oral, Q12H [address], Abner Sherman MD, 600 mg at 03/01/18 0907    hydrOXYzine HCL (ATARAX) tablet 25 mg, 25 mg, Oral, Q6H PRN, Abner Sherman MD    melatonin tablet 3 mg, 3 mg, Oral, HS, Mick Steward MD, 3 mg at 02/28/18 2104    ondansetron (ZOFRAN) 16 mg in sodium chloride 0 9 % 50 mL IVPB, 16 mg, Intravenous, Daily, Roque Andrews MD, Last Rate: 150 mL/hr at 02/28/18 1706, 16 mg at 02/28/18 1706    sodium chloride infusion 0 45 %, 50 mL/hr, Intravenous, Continuous, Mick Steward MD, Last Rate: 50 mL/hr at 03/01/18 0912, 50 mL/hr at 03/01/18 0912    triamcinolone (KENALOG) 0 1 % cream, , Topical, BID, Lee Goldmann      Allergies   Allergen Reactions    Iodinated Diagnostic Agents      IVP dye       ROS:  03/01/18 Reviewed 13 systems:  Presently no headaches, seizures, dizziness, diplopia, dysphagia, hoarseness, chest pain, palpitations, shortness of breath, cough, hemoptysis, abdominal pain, nausea, vomiting, change in bowel habits, melena, hematuria, fever, chills, bleeding, bone pains, skin rash, weight loss,  numbness,  weakness, claudication and gait problem  No hot flashes  Not unusually sensitive to heat or cold  Patient is anxious  No swelling of the ankles  No swollen glands  Other symptoms as in HPI  /62   Pulse 86   Temp 98 8 °F (37 1 °C) (Oral)   Resp 18   Ht 5' 10" (1 778 m)   Wt 74 kg (163 lb 2 3 oz)   SpO2 94%   BMI 23 41 kg/m²     Physical Exam:  Vitals:    02/28/18 1500 02/28/18 1622 03/01/18 0002 03/01/18 0740   BP: 114/64 122/64 125/65 126/62   BP Location: Right arm Right arm Right arm    Pulse: 81 80 82 86   Resp: 20 20 20 18   Temp: 98 6 °F (37 °C) 97 6 °F (36 4 °C) 98 3 °F (36 8 °C) 98 8 °F (37 1 °C)   TempSrc: Oral Oral Oral Oral   SpO2: 92% 95% 96% 94%   Weight:       Height:         Alert, oriented, not in distress, no icterus, no oral thrush, no palpable neck mass, slightly tender right upper neck, clear lung fields, regular heart rate, abdomen  soft and non tender, no palpable abdominal mass, no ascites, no edema of ankles, no calf tenderness, no focal neurological deficit, no skin rash, no palpable lymphadenopathy, good arterial pulses, no clubbing  Patient is anxious  Few small ecchymoses on the forearms  Performance status 1      IMAGING:  IR bone marrow biopsy/aspiration   Final Result by Myriam Talbot MD (02/21 9430)   Impression:      Technically successful image guided bone marrow aspiration and core biopsy         Workstation performed: [id number]             Lab Results      Results from last 7 days  Lab Units 03/01/18  0643 02/28/18  0606 02/27/18  4118 02/26/18  0523 02/25/18  0604 02/24/18  2155 02/24/18  0521  02/23/18  0604   WBC Thousand/uL 30 57* 27 84* 29 95* 36 42* 56 11* 90 45* 106 65*  < > 102 69*   HEMOGLOBIN g/dL 8 3* 7 4* 8 3* 8 0* 8 3* 8 3* 6 6*  < > 7 1*   HEMATOCRIT % 23 6* 21 5* 23 8* 22 8* 22 8* 23 4* 18 7*  < > 20 2*   PLATELETS Thousands/uL 27* 31* 46* 20* 25* 27* 31*  < > 32*   SODIUM mmol/L 135* 136 137 137 135*  --  139  --  134*   POTASSIUM mmol/L 4 3 4 3 4 6 4 2 4 1  --  4 0  --  3 9   CHLORIDE mmol/L 104 105 107 106 106  --  107  --  106   CO2 mmol/L 23 24 21 22 21  --  22  --  21   BUN mg/dL 16 16 16 23 18  --  12  --  12   CREATININE mg/dL 1 13 1 10 1 05 1 01 1 05  --  1 10  --  1 10   CALCIUM mg/dL 8 0* 8 1* 8 2* 7 7* 7 6*  --  7 2*  --  7 4*   MAGNESIUM mg/dL  --   --  2 3 2 2 2 2  --  2 1  --  2 0   PHOSPHORUS mg/dL  --   --  3 8 4 2* 3 8  --  3 2  --  1 6*   TOTAL PROTEIN g/dL  --   --   --  6 8  --   --   --   --   --    GLUCOSE RANDOM mg/dL 97 96 98 106 107  --  105  --  106   < > = values in this interval not displayed  Reviewed and discussed with patient  Assessment and plan: Stephanie Burnette is a 80 y o  male with AML  Patient is on Dacogen  He could be starting URI  ANC is 0  Will consult infection Disease  He is being continued on Dacogen and transfusions  Discussed and explained  Patient voiced understanding and agreement in the discussion  Counseling / Coordination of Care   Greater than 50% of total time was spent with the patient and / or family counseling and / or coordination of care

## 2018-03-01 NOTE — CONSULTS
Consultation - Infectious Disease   Madelyn Harden 80 y o  male MRN: [id number]  Unit/Bed#: [facility] 630-01 Encounter: [id number]      IMPRESSION & RECOMMENDATIONS:   Impression/Recommendations:  1  Febrile neutropenia  Pt had a temp of 100 4 this afternoon  ANC is currently 0  Low-grade fever may be related to #2  Pt complains of b/l anterior tender lymphadenopathy and nasal congestion  Consideration for viral URI  Has mild cough but not new, and pt recently completed 7 day course of ceftriaxone for possible RUL pneumonia  Also possibility of secondary cellulitis of his back lesions  Another consideration in this setting is bacterial translocation from the gut in the setting of neutropenia  Blood cultures have been sent  -c/w cefepime  Increase to 2g q12h   -f/u blood cultures  -check respiratory viral panel  -check throat culture  -monitor signs/symptoms closely    2  B/l cervical tender lymphadenopathy  Also with c/o nasal congestion  Consider viral etiology, as noted above  No oropharyngeal erythema/exudates noted  May be the cause for new low-grade fever  -f/u US neck   -f/u resp viral panel, throat culture    3  Newly-diagnosed AML  S/p Hydrea with good response  Currently receiving Dacogen  Oncology following closely  4  Diffuse back rash  With concern for localized secondary cellulitis  S/p skin biopsy performed on 2/21 with results still pending  Sweets sx vs leukemic infiltrate per Dermatology  -c/w vancomycin for now   -check trough prior to 4th dose  -monitor back lesions closely    5  Recent dx of CAP  S/p 7 days of IV ceftriaxone completed on 2/26  No new respiratory symptoms  Will continue to monitor  6  Thrombocytopenia  Relatively stable  C/t monitor closely while on antibiotics  Antibiotics:  Vancomycin/cefepime D2    S/w SLIM attending regarding plan of care  Thank you for this consultation  We will follow along with you      HISTORY OF PRESENT ILLNESS:  Reason for Consult:  neck nodule    HPI: Walter Karimi is a 80 y o  male with a history of squamous cell cancer initially presented to [facility] with complaints of worsening lethargy, fevers, cough  Was given oral amoxicillin about 4 weeks prior for possible sinusitis  His congestion somewhat improved but he continued to have a residual cough  He also developed worsening lethargy  Also developed a diffuse rash on his back for the past several weeks  The rash has the nonpainful and somewhat itchy  He was noted to have a WBC count of 97,000 with 0 neutrophils and 8% atypical lymphocytes and 14% blasts  He was evaluated by Oncology who had a high suspicion for a mL and recommended transfer to [facility]  Patient was also started on ceftriaxone and azithromycin due to his complaint of cough and chest x-ray findings concerning for right upper lobe pneumonia  Rash was evaluated by Dermatology who felt that it was secondary to his leukemic infiltrate versus Sweet syndrome  A biopsy was taken of the skin  The patient was started on Hydrea with substantial decrease in his WBC count  On 02/27, he was started on Dacogen  Today pt began to c/o some soreness in his throat with a tender right neck node  When I saw him, he c/o b/l tender neck nodes  Also nasal congestion  Denies fevers/chills, dyspnea, worsening cough, N/V, diarrhea, abd pain, new rashes  Says back rash is improving  REVIEW OF SYSTEMS:    A complete system-based review of systems is otherwise negative  PAST MEDICAL HISTORY:  History reviewed  No pertinent past medical history  Past Surgical History:   Procedure Laterality Date    COLONOSCOPY N/A 2012    COLONOSCOPY  01/2013    No repeat is recommended  Findings were diverticulosis and mild hemorrhoids       CYSTOSCOPY      onset: 05/06/2016; diagnostic    HERNIA REPAIR  12/09/2003    GA ENDOVENOUS LASER, 1ST VEIN Left 1/15/2016    Procedure: ENDOVASCULAR LASER THERAPY (EVLT) with multiple stab phlebectomies-between 10-20; Surgeon: Claude Staggers, MD;  Location: [facility];  Service: Vascular    KS PHLEB VEINS - EXTREM - TO 20 Left 1/15/2016    Procedure: LEG ENDOVENOUS LASER OBLITERATION GREATER SAPHENOUS VEIN WITH MULTIPLE VEIN LIGATIONS ;  Surgeon: Claude Staggers, MD;  [facility];  Service: Vascular    TONSILLECTOMY N/A        FAMILY HISTORY:  Non-contributory    SOCIAL HISTORY:  History   Alcohol Use    Yes     Comment: 4 drinks per month     History   Drug use: Unknown     History   Smoking Status    Never Smoker   Smokeless Tobacco    Never Used       ALLERGIES:  Allergies   Allergen Reactions    Iodinated Diagnostic Agents      IVP dye       MEDICATIONS:  All current active medications have been reviewed  PHYSICAL EXAM:  Vitals:  HR:  [80-86] 86  Resp:  [18-20] 18  BP: (122-126)/(62-65) 126/62  SpO2:  [94 %-96 %] 94 %  Temp (24hrs), Av 2 °F (36 8 °C), Min:97 6 °F (36 4 °C), Max:98 8 °F (37 1 °C)  Current: Temperature: 98 8 °F (37 1 °C)     Physical Exam:  General:  Well-nourished, well-developed, in no acute distress  Eyes:  Conjunctive clear with no hemorrhages or effusions  Oropharynx:  No ulcers, no lesions  Neck:  Supple, b/l anterior cervical tender lymph nodes  Lungs:  Clear to auscultation bilaterally, no accessory muscle use  Cardiac:  Regular rate and rhythm, no murmurs  Abdomen:  Soft, non-tender, non-distended  Extremities:  No peripheral cyanosis, clubbing, or edema  Skin:  Diffuse rash on back with scabbed over lesions  Previous biopsy site with mild surrounding erythema  Neurological:  Moves all four extremities spontaneously, sensation grossly intact      LABS, IMAGING, & OTHER STUDIES:  Lab Results:  I have personally reviewed pertinent labs      Results from last 7 days  Lab Units 18  0643 18  0606 18  0607 18  0523   SODIUM mmol/L 135* 136 137 137   POTASSIUM mmol/L 4 3 4 3 4 6 4 2   CHLORIDE mmol/L 104 105 107 106   CO2 mmol/L 23 24 21 223 [facility] mmol/L 8 7 9 9   BUN mg/dL 16 16 16 23   CREATININE mg/dL 1 13 1 10 1 05 1 01   EGFR ml/min/1 73sq m 59 60 64 67   GLUCOSE RANDOM mg/dL 97 96 98 106   CALCIUM mg/dL 8 0* 8 1* 8 2* 7 7*   AST U/L  --   --   --  55*   ALT U/L  --   --   --  33   ALK PHOS U/L  --   --   --  106   TOTAL PROTEIN g/dL  --   --   --  6 8   BILIRUBIN TOTAL mg/dL  --   --   --  0 57       Results from last 7 days  Lab Units 03/01/18  0643 02/28/18  0606 02/27/18  0607   WBC Thousand/uL 30 57* 27 84* 29 95*   HEMOGLOBIN g/dL 8 3* 7 4* 8 3*   PLATELETS Thousands/uL 27* 31* 46*       Imaging Studies:   I have personally reviewed pertinent imaging study reports and images in PACS  Initial chest x-ray showed right upper lobe infiltrate    EKG, Pathology, and Other Studies:   I have personally reviewed pertinent reports

## 2018-03-01 NOTE — PROGRESS NOTES
Dacogen infusion completed, no adverse reactions noted  IV site flushes and aspirates blood freely  Will continue to monitor

## 2018-03-01 NOTE — PROGRESS NOTES
I came to visit patient this afternoon  He is in bed  This is the first time, I see him in bed  He is usually sitting in his chair or getting ready to walk around  He is tired and tells me he is not good company this am  He denies any shortness of breath, chest pain, nausea, vomiting, diarrhea  He shared he has been started on antibiotics because he has a "nodule on the right side of his neck " Heme Onc is concerned for URI  ID has been consulted

## 2018-03-01 NOTE — ASSESSMENT & PLAN NOTE
D/w ID  US neck for rt sided lump and tenderness  Has contrast allergy - may need prep before imaging

## 2018-03-01 NOTE — TELEPHONE ENCOUNTER
Patient continues as in patient  Will continue to monitor and call patient to reschedule once patient discharged

## 2018-03-02 NOTE — ASSESSMENT & PLAN NOTE
Responded to Hydrea and Chemo per Hemonc  ANC 0  Monitor - developed 100 4 temp and  Also c/o sore throat and cellulitis at the skin Bx site  Added Cefepime and Vancmycin  ID consult noted

## 2018-03-02 NOTE — ASSESSMENT & PLAN NOTE
D/w ID  US neck for rt sided lump and tenderness  throat swab - negative  CT head - negative  Lt back skin Bx site improving redness - needs suture removal

## 2018-03-02 NOTE — PROGRESS NOTES
Progress Note - Madelyn Harden 1931, 80 y o  male MRN: [id number]    Unit/Bed#: [facility] 630-01 Encounter: [id number]    Primary Care Provider: Dagmar Iglesias MD   Date and time admitted to hospital: 2018  8:34 PM      * AML (acute myeloblastic leukemia) ([facility] [address] )   Assessment & Plan    Responded to Hydrea and Chemo per Hemonc  ANC 0  Monitor - developed 100 4 temp and  Also c/o sore throat and cellulitis at the skin Bx site  Added Cefepime and Vancmycin  ID consult noted          Febrile neutropenia (HCC)   Assessment & Plan    D/w ID  US neck for rt sided lump and tenderness  throat swab - negative  CT head - negative  Lt back skin Bx site improving redness - needs suture removal          MARIA VICTORIA (acute kidney injury) ([facility] [address] )   Assessment & Plan    Resolved, continue to monitor BMP        Thrombocytopenia (HCC)   Assessment & Plan    Platelet transfusion today  Monitor daily        Anemia   Assessment & Plan    Hb acceptable post PRBC - monitor with daily labs        Lethargic   Assessment & Plan    Reports sluggishness; mild confusion  CT head - negative - monitor          VTE Pharmacologic Prophylaxis: Pharmacologic VTE Prophylaxis contraindicated due to low platelets  Mechanical: Mechanical VTE prophylaxis in place  Patient Centered Rounds: I have performed bedside rounds with nursing staff today  Discussions with Specialists or Other Care Team Provider:     Education and Discussions with Family / Patient:     Time Spent for Care: More than 50% of total time spent on counseling and coordination of care as described above      Current Length of Stay: 10 day(s)    Current Patient Status: Inpatient   Certification Statement: The patient will continue to require additional inpatient hospital stay due to [facility]    Discharge Plan:    Code Status: Level 3 - DNAR and DNI      Subjective: lethargic this AM    Objective:     Vitals:   Temp (24hrs), Av 8 °F (37 1 °C), Min:97 9 °F (36 6 °C), Max:99 4 °F (37 4 °C)    HR:  [80-95] 84  Resp:  [18] 18  BP: (130-152)/(64-78) 140/66  SpO2:  [93 %-97 %] 96 %  Body mass index is 23 41 kg/m²  Input and Output Summary (last 24 hours): Intake/Output Summary (Last 24 hours) at 03/02/18 1642  Last data filed at 03/02/18 1612   Gross per 24 hour   Intake          1820 83 ml   Output             2200 ml   Net          -379 17 ml       Physical Exam   HENT:   Head: Normocephalic  Eyes: Pupils are equal, round, and reactive to light  Cardiovascular: Normal heart sounds  Pulmonary/Chest: Effort normal    Abdominal: Soft  Bowel sounds are normal    Musculoskeletal: Normal range of motion  Neurological: He is alert  Skin: There is pallor  Additional Data:     Labs:      Results from last 7 days  Lab Units 03/02/18  0603   WBC Thousand/uL 35 23*   HEMOGLOBIN g/dL 8 0*   HEMATOCRIT % 23 0*   PLATELETS Thousands/uL 19*   LYMPHO PCT % 27   MONO PCT MAN % 14*   EOSINO PCT MANUAL % 0       Results from last 7 days  Lab Units 03/02/18  0603  02/26/18  0523   SODIUM mmol/L 135*  < > 137   POTASSIUM mmol/L 4 0  < > 4 2   CHLORIDE mmol/L 104  < > 106   CO2 mmol/L 23  < > 22   BUN mg/dL 15  < > 23   CREATININE mg/dL 1 11  < > 1 01   CALCIUM mg/dL 8 3  < > 7 7*   TOTAL PROTEIN g/dL  --   --  6 8   BILIRUBIN TOTAL mg/dL  --   --  0 57   ALK PHOS U/L  --   --  106   ALT U/L  --   --  33   AST U/L  --   --  55*   GLUCOSE RANDOM mg/dL 117  < > 106   < > = values in this interval not displayed  Results from last 7 days  Lab Units 02/26/18  0523   INR  1 32*       * I Have Reviewed All Lab Data Listed Above  Imaging:  Imaging Personally Reviewed by Myself Includes:     Cultures:   Blood Culture:   Lab Results   Component Value Date    BLOODCX No Growth After 5 Days  02/19/2018    BLOODCX No Growth After 5 Days   02/19/2018     Urine Culture: No results found for: URINECX  Sputum Culture: No components found for: SPUTUMCX  Wound Culture: No results found for: WOUNDCULT    Last 24 Hours Medication List:     Current Facility-Administered Medications:  acetaminophen 650 mg Oral Q6H PRN Thom Cole MD    acyclovir 400 mg Oral Q12H Lakesha Vela MD PhD    allopurinol 100 mg Oral Daily Mauri Huggins MD    baclofen 5 mg Oral TID Penny Chicas MD    benzonatate 100 mg Oral TID PRN Thom Cole MD    cefepime 2,000 mg Intravenous Q12H Anabel Aldea, DO Last Rate: 2,000 mg (03/02/18 1357)   decitabine (DACOGEN) IVPB 38 mg Intravenous Daily Alvin Leon MD Last Rate: 38 mg (03/01/18 1827)   guaiFENesin 600 mg Oral Q12H [address] Thom Cole MD    hydrOXYzine HCL 25 mg Oral Q6H PRN Thom Cole MD    melatonin 3 mg Oral HS Mauri Huggins MD    ondansetron (ZOFRAN) IVPB (ONC use only) 16 mg Intravenous Daily Alvin Leon MD Last Rate: 16 mg (03/01/18 1801)   sodium chloride 50 mL/hr Intravenous Continuous Mauri Huggins MD Last Rate: 50 mL/hr (03/01/18 4892)   triamcinolone  Topical BID Hari Ho Jr     vancomycin 10 mg/kg Intravenous Q12H Penny Chicas MD Last Rate: 750 mg (03/02/18 3629)        Today, Patient Was Seen By: Penny Chicas MD    ** Please Note: Dragon 360 Dictation voice to text software may have been used in the creation of this document   **

## 2018-03-02 NOTE — PROGRESS NOTES
Given severe trilineage marrow suppresion, will reduce anti-thrombotic or pro-ecchymotic interventions, such as SCDs  Given lethargy, pt is ordered as a fall risk now, and we have reduced anticholinergic drugs to help improve alertness

## 2018-03-02 NOTE — PROGRESS NOTES
Patient: Dedra Umanzor  Patient MRN: [id number]  Service date: 2/25/2018  Attending Physician:       CHIEF COMPLAIN  No chief complaint on file  Heme / Oncology history:  Dedra Umanzor is a 80 y o  male  With newly diagnosed AML  Received hydroxyurea a 1000 milligrams twice a day from 2/23-2/25  On  Moises Medina now, currently day 4/5  Pt reported doing well  No fever, chills  No bleeding  HISTORY OF PRESENT ILLNESS:  Dedra Umanzor is a 80 y o  male who presents      PAST MEDICAL HISTORY:   has no past medical history on file  PAST SURGICAL HISTORY:   has a past surgical history that includes Hernia repair (12/09/2003); Colonoscopy (N/A, 2012); Tonsillectomy (N/A); pr phleb veins - extrem - to 20 (Left, 1/15/2016); pr endovenous laser, 1st vein (Left, 1/15/2016); Colonoscopy (01/2013); and Cystoscopy  CURRENT MEDICATIONS  Scheduled Meds:    Current Facility-Administered Medications:  acetaminophen 650 mg Oral Q6H PRN Miguel A Florence MD    acyclovir 400 mg Oral Q12H Lazarus Dustman, MD PhD    allopurinol 100 mg Oral Daily Gerald Angulo MD    benzonatate 100 mg Oral TID PRN Miguel A Florence MD    cefTRIAXone 1,000 mg Intravenous Q24H Miguel A Florence MD Last Rate: Stopped (02/24/18 1610)   guaiFENesin 600 mg Oral Q12H [address] Miguel A Florence MD    hydroxyurea 1,000 mg Oral Q12H [address], MD    hydrOXYzine HCL 25 mg Oral Q6H PRN Miguel A Florence MD    melatonin 3 mg Oral HS Gerald Angulo MD    sodium bicarbonate infusion 50 mL/hr Intravenous Continuous Gerald Angulo MD Last Rate: Stopped (02/24/18 1225)   sodium chloride 50 mL/hr Intravenous Continuous Gerald Angulo MD Last Rate: Stopped (02/24/18 1225)   triamcinolone  Topical BID Demian Miss        Continuous Infusions:    sodium bicarbonate infusion 50 mL/hr Last Rate: Stopped (02/24/18 1225)   sodium chloride 50 mL/hr Last Rate: Stopped (02/24/18 1225)     PRN Meds:   acetaminophen    benzonatate    hydrOXYzine HCL    SOCIAL HISTORY: reports that he has never smoked  He has never used smokeless tobacco  He reports that he drinks alcohol  His drug history is not on file  FAMILY HISTORY:  family history includes Coronary artery disease in his father and mother; Heart disease in his father; Stroke in his mother  ALLERGIES:  is allergic to iodinated diagnostic agents  REVIEW OF SYSTEMS:  Please note that a 14-point review of systems was performed to include Constitutional, HEENT, Respiratory, CVS, GI, , Musculoskeletal, Integumentary, Neurologic, Rheumatologic, Endocrinologic, Psychiatric, Lymphatic, and Hematologic/Oncologic systems were reviewed and are negative unless otherwise stated in HPI  Positive and negative findings pertinent to this evaluation are incorporated into the history of present illness  PHYSICAL EXAMINATION:  Vital Signs: Temp:  [97 5 °F (36 4 °C)-99 °F (37 2 °C)] 98 6 °F (37 °C)  HR:  [76-98] 76  Resp:  [18-21] 21  BP: (104-128)/(58-71) 104/64  Body mass index is 23 19 kg/m²  Body surface area is 1 91 meters squared  Constitutional: Alert and oriented  HEENT: Anicteric, PERRLA  Chest: Decreased breathing sound bilaterally, No wheezes/rales/rhonchi  CVS: Regular rhythm  Normal rate  Abdomen: Soft, nontender, nondistended  Bowel sounds positive X 4  No palpable organomegaly  Back: diffuse erythematous rash  Left flank mass 6 cm in diameter  Extremities: No cyanosis/clubbing/edema  Integumentary: No obvious rashes or bruises  Musculoskeletal: No obvious bony or joint deformities  Psychiatric: Appropriate affect and mood  Lymph Node Survey: No palpable preauricular, submandibular, cervical, supraclavicular, axillary, epitrochlear or inguinal lymphadenopathy      LABS:    Results from last 7 days  Lab Units 02/25/18  0604 02/24/18  2155 02/24/18  0521 02/23/18  1706   WBC Thousand/uL 56 11* 90 45* 106 65* 123 30*   HEMATOCRIT % 22 8* 23 4* 18 7* 20 5*   PLATELETS Thousands/uL 25* 27* 31* 31*   MONO PCT MAN % 62*  --  58* 69*       Results from last 7 days  Lab Units 02/25/18  0604 02/24/18  0521 02/23/18  0604   SODIUM mmol/L 135* 139 134*   POTASSIUM mmol/L 4 1 4 0 3 9   CHLORIDE mmol/L 106 107 106   CO2 mmol/L 21 22 21   BUN mg/dL 18 12 12       Results from last 7 days  Lab Units 02/25/18  0604 02/24/18  0521 02/23/18  0604  02/20/18  0606 02/19/18  1642   PHOSPHORUS mg/dL 3 8 3 2 1 6*  < > 2 7  --    MAGNESIUM mg/dL 2 2 2 1 2 0  < > 2 0  --    ALBUMIN g/dL  --   --   --   --  2 5* 3 2*   BILIRUBIN TOTAL mg/dL  --   --   --   --  0 66 0 75   ALK PHOS U/L  --   --   --   --  66 79   ALT U/L  --   --   --   --  24 25   AST U/L  --   --   --   --  37 40   < > = values in this interval not displayed      Results from last 7 days  Lab Units 02/20/18  0607 02/19/18  2310   INR  1 39* 1 37*   PTT seconds 41* 46*           Invalid input(s): TNI,  PCT      Results from last 7 days  Lab Units 02/20/18  0606   LD U/L 486*             IMAGING  IR bone marrow biopsy/aspiration   Final Result   Impression:      Technically successful image guided bone marrow aspiration and core biopsy         Workstation performed: [id number]         :      PROBLEM LIST:    Patient Active Problem List   Diagnosis    Allergic rhinitis    Atopic dermatitis    Balance problems    Enlarged prostate without lower urinary tract symptoms (luts)    Erectile dysfunction    Folliculitis    Hematuria, gross    Hyperglycemia    Melanoma in situ ([address] )    Peripheral arterial disease (HCC)    Peripheral neuropathy    Peripheral vascular disease (HCC)    Lethargic    Myalgia    Fever    Monocytosis    Generalized skin papules    SIRS (systemic inflammatory response syndrome) (HCC)    Lethargy    Anemia    Thrombocytopenia (HCC)    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([address] )    MARIA VICTORIA (acute kidney injury) ([address] )    CAP (community acquired pneumonia)    AML (acute myeloblastic leukemia) ([address] )    Skin abnormality       ASSESSMENT/PLAN:  Jaden James is a 80 y o  male with:    1) AML:  - received hydrea  On day 4/5 Decagen  - tollerating well  - continue allopurinol 100 mg  will check uric acid in Am  - continue acyclovir 400 mg po bid  - will need follow up as out pt in [facility] or [facility]  2) pancytopenia   - anemia : 2nd to cancer    - platelet  : receiving one unit today  - close monitor, keep Hb > 8 and plt > 20k      3) back lump : likely hematoma  - warm patch  - Hb is stable  - low threshold for Ultrasound for further evaluation    4) ?  Infection   - neck u/s to be done    Neida Michelle MD PhD  Hematology / Oncology

## 2018-03-02 NOTE — PROGRESS NOTES
Bed alarm on reviewed fall precautions , call bell within reach, phone within, reach close to nurses station yellow sock and band on

## 2018-03-02 NOTE — PROGRESS NOTES
Progress Note - Infectious Disease   Miguelito Spencer 80 y o  male MRN: [id number]  Unit/Bed#: [facility] 630-01 Encounter: [id number]      Impression/Recommendations:  1  Febrile neutropenia  Pt had a temp of 100 4  No other fevers since  ANC still low at 350  Low-grade fever may be related to #2  Pt complains of b/l anterior tender lymphadenopathy and nasal congestion  Consideration for viral URI  Has mild cough but not new, and pt recently completed 7 day course of ceftriaxone for possible RUL pneumonia  Also possibility of secondary cellulitis/abscess formation of one of his back lesions  Another consideration in this setting is bacterial translocation from the gut in the setting of neutropenia  Blood cultures have been sent and are still pending      -c/w cefepime for now  -f/u blood cultures  -follow-up respiratory viral panel  -monitor signs/symptoms closely     2  B/l cervical tender lymphadenopathy  Also with c/o nasal congestion  Consider viral etiology, as noted above  No oropharyngeal erythema/exudates noted  May related to episode of low-grade fever      -f/u US neck   -f/u resp viral panel     3  Newly-diagnosed AML  S/p Hydrea with good response  Currently receiving Dacogen  Oncology following closely      4  Diffuse back rash  With concern for localized secondary cellulitis/abscess  S/p skin biopsy performed on 2/21 with results still pending  Sweets sx vs leukemic infiltrate per Dermatology      -c/w vancomycin for now   -check trough prior to 4th dose  -monitor back lesion  If persistent, we may need to ask surgery to evaluate for possible I&D      5  Recent dx of CAP  S/p 7 days of IV ceftriaxone completed on 2/26  No new respiratory symptoms  Will continue to monitor      6  Thrombocytopenia  Relatively stable  C/t monitor closely while on antibiotics        Antibiotics:  Vancomycin/cefepime D3    Subjective:  Feels more energetic this afternoon    Continues to have discomfort in both sides of his neck, worse on the right  Tolerating oral diet  Denies fevers, chills, or sweats  Denies nausea, vomiting, or diarrhea  Denies back pain or drainage  Objective:  Vitals:  HR:  [80-95] 84  Resp:  [18] 18  BP: (130-152)/(64-78) 140/66  SpO2:  [93 %-97 %] 96 %  Temp (24hrs), Av 8 °F (37 1 °C), Min:97 9 °F (36 6 °C), Max:99 4 °F (37 4 °C)  Current: Temperature: 97 9 °F (36 6 °C)    Physical Exam:   General:  No acute distress  Psychiatric:  Awake and alert  HEENT:  Bilateral anterior mild tender cervical lymphadenopathy, no oropharyngeal edema or exudate  Pulmonary:  Normal respiratory excursion without accessory muscle use  Abdomen:  Soft, nontender  Extremities:  Diffuse back rash with scabbed over lesions  Previous biopsy site with induration and erythema  No drainage  Skin:  No rashes    Lab Results:  I have personally reviewed pertinent labs  Results from last 7 days  Lab Units 18  0523   SODIUM mmol/L 135* 135* 136  < > 137   POTASSIUM mmol/L 4 0 4 3 4 3  < > 4 2   CHLORIDE mmol/L 104 104 105  < > 106   CO2 mmol/L 23 23 24  < > 22   ANION GAP mmol/L 8 8 7  < > 9   BUN mg/dL 15 16 16  < > 23   CREATININE mg/dL 1 11 1 13 1 10  < > 1 01   EGFR ml/min/1 73sq m 60 59 60  < > 67   GLUCOSE RANDOM mg/dL 117 97 96  < > 106   CALCIUM mg/dL 8 3 8 0* 8 1*  < > 7 7*   AST U/L  --   --   --   --  55*   ALT U/L  --   --   --   --  33   ALK PHOS U/L  --   --   --   --  106   TOTAL PROTEIN g/dL  --   --   --   --  6 8   BILIRUBIN TOTAL mg/dL  --   --   --   --  0 57   < > = values in this interval not displayed  Results from last 7 days  Lab Units 1843 18  0606   WBC Thousand/uL 35 23* 30 57* 27 84*   HEMOGLOBIN g/dL 8 0* 8 3* 7 4*   PLATELETS Thousands/uL 19* 27* 31*           Imaging Studies:   I have personally reviewed pertinent imaging study reports and images in PACS      CT head is negative    EKG, Pathology, and Other Studies:   I have personally reviewed pertinent reports

## 2018-03-03 NOTE — PROGRESS NOTES
Progress Note - [facility], 80 y o  male MRN: [id number]    [facility] Encounter: [id number]    Primary Care Provider: Isabella Drake MD   Date and time admitted to hospital: 2018  8:34 PM    * AML (acute myeloblastic leukemia) ([facility] [address] )   Assessment & Plan    Responded to Hydrea and Chemo per Hemonc    D/w ID          Febrile neutropenia (HCC)   Assessment & Plan    D/w ID  US neck - no abscess; large lymph node  throat swab - negative  CT head - negative  Lt back skin Bx site improving redness - needs suture removal  Note cellulitis around suture site - surgical consult          MARIA VICTORIA (acute kidney injury) ([facility] )   Assessment & Plan    Resolved, continue to monitor BMP        Anemia   Assessment & Plan    2" AML - monitor Hb - stable        Lethargic   Assessment & Plan    Better today -monitor only          VTE Pharmacologic Prophylaxis: Pharmacologic VTE Prophylaxis contraindicated due to low platelets  Mechanical: Mechanical VTE prophylaxis in place  Patient Centered Rounds: I have performed bedside rounds with nursing staff today  Discussions with Specialists or Other Care Team Provider:     Education and Discussions with Family / Patient: family updated over phone    Time Spent for Care: More than 50% of total time spent on counseling and coordination of care as described above  Current Length of Stay: 11 day(s)    Current Patient Status: Inpatient   Certification Statement: The patient will continue to require additional inpatient hospital stay due to all above    Discharge Plan:    Code Status: Level 3 - DNAR and DNI      Subjective: no new complaints    Objective:     Vitals:   Temp (24hrs), Av 1 °F (37 3 °C), Min:98 9 °F (37 2 °C), Max:99 5 °F (37 5 °C)    HR:  [88-91] 91  Resp:  [18-20] 20  BP: (114-129)/(67-78) 114/67  SpO2:  [93 %-94 %] 93 %  Body mass index is 23 41 kg/m²  Input and Output Summary (last 24 hours):        Intake/Output Summary (Last 24 hours) at 03/03/18 1616  Last data filed at 03/03/18 1500   Gross per 24 hour   Intake             1330 ml   Output             1750 ml   Net             -420 ml       Physical Exam   HENT:   Head: Normocephalic  Eyes: Pupils are equal, round, and reactive to light  Cardiovascular: Normal heart sounds  Pulmonary/Chest: Effort normal    Abdominal: Soft  Neurological: He is alert  Skin: There is pallor  Additional Data:     Labs:      Results from last 7 days  Lab Units 03/03/18  1136   WBC Thousand/uL 22 46*   HEMOGLOBIN g/dL 7 8*   HEMATOCRIT % 22 8*   PLATELETS Thousands/uL 52*   LYMPHO PCT % 23   MONO PCT MAN % 16*   EOSINO PCT MANUAL % 0       Results from last 7 days  Lab Units 03/02/18  0603  02/26/18  0523   SODIUM mmol/L 135*  < > 137   POTASSIUM mmol/L 4 0  < > 4 2   CHLORIDE mmol/L 104  < > 106   CO2 mmol/L 23  < > 22   BUN mg/dL 15  < > 23   CREATININE mg/dL 1 11  < > 1 01   CALCIUM mg/dL 8 3  < > 7 7*   TOTAL PROTEIN g/dL  --   --  6 8   BILIRUBIN TOTAL mg/dL  --   --  0 57   ALK PHOS U/L  --   --  106   ALT U/L  --   --  33   AST U/L  --   --  55*   GLUCOSE RANDOM mg/dL 117  < > 106   < > = values in this interval not displayed  Results from last 7 days  Lab Units 02/26/18  0523   INR  1 32*       * I Have Reviewed All Lab Data Listed Above  Imaging:  Imaging Personally Reviewed by Myself Includes:     Cultures:   Blood Culture:   Lab Results   Component Value Date    BLOODCX No Growth at 24 hrs  03/01/2018    BLOODCX No Growth at 24 hrs  03/01/2018    BLOODCX No Growth After 5 Days  02/19/2018    BLOODCX No Growth After 5 Days   02/19/2018     Urine Culture: No results found for: URINECX  Sputum Culture: No components found for: SPUTUMCX  Wound Culture: No results found for: WOUNDCULT    Last 24 Hours Medication List:     Current Facility-Administered Medications:  acetaminophen 650 mg Oral Q6H PRN Consuelo Phan MD    acyclovir 400 mg Oral Q12H Brittney Erazo MD PhD allopurinol 100 mg Oral Daily Tamara Adrian MD    baclofen 5 mg Oral BID Enrique Baltazar [place]    benzonatate 100 mg Oral TID PRN Sebastián Marin MD    cefepime 2,000 mg Intravenous Q12H Anabel Hogan DO Last Rate: Stopped (03/03/18 1406)   decitabine (DACOGEN) IVPB 38 mg Intravenous Daily Sarah Eldridge MD Last Rate: 38 mg (03/02/18 1812)   guaiFENesin 600 mg Oral Q12H [address] Sebastián Marin MD    hydrOXYzine HCL 10 mg Oral TID PRN Enrique Baltazar [place]    melatonin 3 mg Oral Daily With [employer] [place]    ondansetron (ZOFRAN) IVPB (ONC use only) 16 mg Intravenous Daily Sarah Eldridge MD Last Rate: 16 mg (03/02/18 1734)   sodium chloride 50 mL/hr Intravenous Continuous Tamara Adrian MD Last Rate: 50 mL/hr (03/02/18 2217)   triamcinolone  Topical BID Paola Osorio  Today, Patient Was Seen By: Kaye Beckman MD    ** Please Note: Dragon 360 Dictation voice to text software may have been used in the creation of this document   **

## 2018-03-03 NOTE — TREATMENT PLAN
Patient reports someone told him "he is going to feel worse, before he feels better " He reports feeling worse  He is tired and weak and would just like to sleep  He denies any nausea, pain, shortness of breath  Will re-check on patient on Monday

## 2018-03-03 NOTE — PROGRESS NOTES
Patient: Catie Koch  Patient MRN: [id number]  Service date: 2/25/2018  Attending Physician:       CHIEF COMPLAIN  No chief complaint on file  Heme / Oncology history:  Catie Koch is a 80 y o  male  With newly diagnosed AML  Received hydroxyurea a 1000 milligrams twice a day from 2/23-2/25  On  R [address] now, currently day 5/5  Pt reported doing well  No fever, chills  No bleeding  HISTORY OF PRESENT ILLNESS:  Catie Koch is a 80 y o  male who presents      PAST MEDICAL HISTORY:   has no past medical history on file  PAST SURGICAL HISTORY:   has a past surgical history that includes Hernia repair (12/09/2003); Colonoscopy (N/A, 2012); Tonsillectomy (N/A); pr phleb veins - extrem - to 20 (Left, 1/15/2016); pr endovenous laser, 1st vein (Left, 1/15/2016); Colonoscopy (01/2013); and Cystoscopy  CURRENT MEDICATIONS  Scheduled Meds:    Current Facility-Administered Medications:  acetaminophen 650 mg Oral Q6H PRN Italo Mendoza MD    acyclovir 400 mg Oral Q12H Sb Prakash MD PhD    allopurinol 100 mg Oral Daily Miguelangel Luis MD    benzonatate 100 mg Oral TID PRN Italo Mendoza MD    cefTRIAXone 1,000 mg Intravenous Q24H Italo Mendoza MD Last Rate: Stopped (02/24/18 1610)   guaiFENesin 600 mg Oral Q12H [address] Italo Mendoza MD    hydroxyurea 1,000 mg Oral Q12H Carlenestdread Augustin MD    hydrOXYzine HCL 25 mg Oral Q6H PRN Italo Mendoza MD    melatonin 3 mg Oral HS Miguelangel Luis MD    sodium bicarbonate infusion 50 mL/hr Intravenous Continuous Miguelangel Luis MD Last Rate: Stopped (02/24/18 1225)   sodium chloride 50 mL/hr Intravenous Continuous Miguelangel Luis MD Last Rate: Stopped (02/24/18 1225)   triamcinolone  Topical BID [facility]        Continuous Infusions:    sodium bicarbonate infusion 50 mL/hr Last Rate: Stopped (02/24/18 1225)   sodium chloride 50 mL/hr Last Rate: Stopped (02/24/18 1225)     PRN Meds:   acetaminophen    benzonatate    hydrOXYzine HCL    SOCIAL HISTORY: reports that he has never smoked  He has never used smokeless tobacco  He reports that he drinks alcohol  His drug history is not on file  FAMILY HISTORY:  family history includes Coronary artery disease in his father and mother; Heart disease in his father; Stroke in his mother  ALLERGIES:  is allergic to iodinated diagnostic agents  REVIEW OF SYSTEMS:  Please note that a 14-point review of systems was performed to include Constitutional, HEENT, Respiratory, CVS, GI, , Musculoskeletal, Integumentary, Neurologic, Rheumatologic, Endocrinologic, Psychiatric, Lymphatic, and Hematologic/Oncologic systems were reviewed and are negative unless otherwise stated in HPI  Positive and negative findings pertinent to this evaluation are incorporated into the history of present illness  PHYSICAL EXAMINATION:  Vital Signs: Temp:  [97 5 °F (36 4 °C)-99 °F (37 2 °C)] 98 6 °F (37 °C)  HR:  [76-98] 76  Resp:  [18-21] 21  BP: (104-128)/(58-71) 104/64  Body mass index is 23 19 kg/m²  Body surface area is 1 91 meters squared  Constitutional: Alert and oriented  HEENT: Anicteric, PERRLA  Chest: Decreased breathing sound bilaterally, No wheezes/rales/rhonchi  CVS: Regular rhythm  Normal rate  Abdomen: Soft, nontender, nondistended  Bowel sounds positive X 4  No palpable organomegaly  Back: diffuse erythematous rash  Left flank mass 6 cm in diameter  Extremities: No cyanosis/clubbing/edema  Integumentary: No obvious rashes or bruises  Musculoskeletal: No obvious bony or joint deformities  Psychiatric: Appropriate affect and mood  Lymph Node Survey: No palpable preauricular, submandibular, cervical, supraclavicular, axillary, epitrochlear or inguinal lymphadenopathy      LABS:    Results from last 7 days  Lab Units 02/25/18  0604 02/24/18  2155 02/24/18  0521 02/23/18  1706   WBC Thousand/uL 56 11* 90 45* 106 65* 123 30*   HEMATOCRIT % 22 8* 23 4* 18 7* 20 5*   PLATELETS Thousands/uL 25* 27* 31* 31*   MONO PCT MAN % 62*  --  58* 69*       Results from last 7 days  Lab Units 02/25/18  0604 02/24/18  0521 02/23/18  0604   SODIUM mmol/L 135* 139 134*   POTASSIUM mmol/L 4 1 4 0 3 9   CHLORIDE mmol/L 106 107 106   CO2 mmol/L 21 22 21   BUN mg/dL 18 12 12       Results from last 7 days  Lab Units 02/25/18  0604 02/24/18  0521 02/23/18  0604  02/20/18  0606 02/19/18  1642   PHOSPHORUS mg/dL 3 8 3 2 1 6*  < > 2 7  --    MAGNESIUM mg/dL 2 2 2 1 2 0  < > 2 0  --    ALBUMIN g/dL  --   --   --   --  2 5* 3 2*   BILIRUBIN TOTAL mg/dL  --   --   --   --  0 66 0 75   ALK PHOS U/L  --   --   --   --  66 79   ALT U/L  --   --   --   --  24 25   AST U/L  --   --   --   --  37 40   < > = values in this interval not displayed      Results from last 7 days  Lab Units 02/20/18  0607 02/19/18  2310   INR  1 39* 1 37*   PTT seconds 41* 46*           Invalid input(s): TNI,  PCT      Results from last 7 days  Lab Units 02/20/18  0606   LD U/L 486*             IMAGING  IR bone marrow biopsy/aspiration   Final Result   Impression:      Technically successful image guided bone marrow aspiration and core biopsy         Workstation performed: [id number]         :      PROBLEM LIST:    Patient Active Problem List   Diagnosis    Allergic rhinitis    Atopic dermatitis    Balance problems    Enlarged prostate without lower urinary tract symptoms (luts)    Erectile dysfunction    Folliculitis    Hematuria, gross    Hyperglycemia    Melanoma in situ ([facility] [address] )    Peripheral arterial disease (HCC)    Peripheral neuropathy    Peripheral vascular disease (HCC)    Lethargic    Myalgia    Fever    Monocytosis    Generalized skin papules    SIRS (systemic inflammatory response syndrome) (HCC)    Lethargy    Anemia    Thrombocytopenia (HCC)    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([address] )    MARIA VICTORIA (acute kidney injury) ([facility] [address] )    CAP (community acquired pneumonia)    AML (acute myeloblastic leukemia) ([facility] [address] )    Skin abnormality       ASSESSMENT/PLAN:  Reji bradley a 80 y o  male with:    1) AML:  - received hydrea  On day 5/5 Decagen  - tollerating well  - continue allopurinol 100 mg  will check uric acid in [place]  - continue acyclovir 400 mg po bid  - will [facility] [employer]  I will update Dr Jessica Flores on Monday and make further plan  - possible D/C on Monday with twice weekly lab and possible transfusion  2) pancytopenia   - anemia : 2nd to cancer    - platelet  : receiving one unit today  - close monitor, keep Hb > 8 and plt > 20k  - plt transfusion 3/2      3) back lump : likely hematoma  - warm patch  - Hb is stable  4) ? Infection   - neck u/s  : no abscess         Blas Arellano MD PhD  Hematology / Oncology

## 2018-03-03 NOTE — MEDICAL STUDENT
Consultation - General Surgery   Miguelito Spencer 80 y o  male MRN: [id number]  Unit/Bed#: [facility] 630-01 Encounter: [id number]    Assessment/Plan     Assessment:  81 y/o male s/p biopsy who developed an abscess at the site  Plan:    History of Present Illness   HPI:  Miguelito Spencer is a 80 y o  male who presented originally with lethargy and weakness, was subsequently found to have AML  We are consulted today for an abscess at a skin biopsy site for possible drainage  Beginning roughly 6 weeks prior to admission he was had a diffuse rash on his back  Dermatology saw the patient on 2/21/18  and took 1-3 mm punch biopsy of his left lower back and placed him on triamcinolone   1% cream BID  The patient states that the pain had been slowly improving then beginning a couple days ago it began to get a little worse  The area is currently indurated, red and tender to touch  No discharge has been noted with a single suture  He denies any fevers, chills or feeling ill in the last couple days  Currently he is on Cefepime for febrile neutropenia and was on vancomycin that was discontinued today  Consults    Review of Systems    Historical Information   History reviewed  No pertinent past medical history  Past Surgical History:   Procedure Laterality Date    COLONOSCOPY N/A 2012    COLONOSCOPY  01/2013    No repeat is recommended  Findings were diverticulosis and mild hemorrhoids   CYSTOSCOPY      onset: 05/06/2016; diagnostic    HERNIA REPAIR  12/09/2003    OK ENDOVENOUS LASER, 1ST VEIN Left 1/15/2016    Procedure: ENDOVASCULAR LASER THERAPY (EVLT) with multiple stab phlebectomies-between 10-20;   Surgeon: John Marino MD;  [facility]: [facility];  Service: Vascular    OK PHLEB VEINS - EXTREM - TO 20 Left 1/15/2016    Procedure: LEG ENDOVENOUS LASER OBLITERATION GREATER SAPHENOUS VEIN WITH MULTIPLE VEIN LIGATIONS ;  Surgeon: John Marino MD;  [facility];  Service: Vascular    TONSILLECTOMY N/A      Social History   History   Alcohol Use    Yes     Comment: 4 drinks per month     History   Drug use: Unknown     History   Smoking Status    Never Smoker   Smokeless Tobacco    Never Used     Family History: non-contributory    Meds/Allergies   all current active meds have been reviewed  Allergies   Allergen Reactions    Iodinated Diagnostic Agents      IVP dye       Objective   First Vitals:   Blood Pressure: 130/78 (02/20/18 2035)  Pulse: 84 (02/20/18 2035)  Temperature: 99 9 °F (37 7 °C) (02/20/18 2035)  Temp Source: Oral (02/20/18 2035)  Respirations: 18 (02/20/18 2035)  Height: 5' 10" (177 8 cm) (02/20/18 2035)  Weight - Scale: 73 3 kg (161 lb 9 6 oz) (Standing ) (02/21/18 0902)  SpO2: 95 % (02/20/18 2035)    Current Vitals:   Blood Pressure: 114/67 (03/03/18 1500)  Pulse: 91 (03/03/18 1500)  Temperature: 99 °F (37 2 °C) (03/03/18 1500)  Temp Source: Oral (03/03/18 1500)  Respirations: 20 (03/03/18 1500)  Height: 5' 10" (177 8 cm) (02/27/18 1417)  Weight - Scale: 74 kg (163 lb 2 3 oz) (02/27/18 1417)  SpO2: 93 % (03/03/18 1500)      Intake/Output Summary (Last 24 hours) at 03/03/18 1735  Last data filed at 03/03/18 1500   Gross per 24 hour   Intake             1570 ml   Output             1750 ml   Net             -180 ml       Invasive Devices     Peripheral Intravenous Line            Peripheral IV 02/27/18 Left Forearm 4 days    Peripheral IV 03/03/18 Right Arm less than 1 day                Physical Exam    Lab Results:   CBC:   Lab Results   Component Value Date    WBC 22 46 (H) 03/03/2018    HGB 7 8 (L) 03/03/2018    HCT 22 8 (L) 03/03/2018    MCV 90 03/03/2018    PLT 52 (L) 03/03/2018    MCH 30 7 03/03/2018    MCHC 34 2 03/03/2018    RDW 14 9 03/03/2018    MPV 8 5 (L) 03/03/2018    NRBC 0 03/03/2018     Imaging: I have personally reviewed pertinent reports  EKG, Pathology, and Other Studies: I have personally reviewed pertinent reports        Counseling / Coordination of Care  Total floor / unit time spent today  minutes  Greater than 50% of total time was spent with the patient and / or family counseling and / or coordination of care  A description of the counseling / coordination of care: Conor Merlos

## 2018-03-03 NOTE — ASSESSMENT & PLAN NOTE
D/w ID  US neck - no abscess; large lymph node  throat swab - negative  CT head - negative  Lt back skin Bx site improving redness - needs suture removal  Note cellulitis around suture site - surgical consult

## 2018-03-03 NOTE — SOCIAL WORK
Family concerns/questions:     CLAY Rust reported to CM stating the family expressed concerns about a possible dc Monday 3/5 per Heme-Onc  CM called Dr Chel Gunn who explained that he did not anticipate a dc at this time and he would be in to speak with the family this afternoon  CM met with the family at bedside to explain the dc planning process and how the team works together to ensure a safe dc plan  Family expressed concern of pt's wife be able to handle the patient's needs at home and emphasized that his current condition was too much for her to handle  CM explained possible resources for a home dc plan and how a rehab dc plan would work  Family expressed understanding of the options and stated they look forward to working with case management on dc planning when the details of the patient's status are know around the time of discharge  CM provided the family with both RN DUSTIN and ZHANNA CHAN business cards

## 2018-03-03 NOTE — PROGRESS NOTES
Progress Note - Infectious Disease   Bettie Roth 80 y o  male MRN: [id number]  Unit/Bed#: [facility] 630-01 Encounter: [id number]         Impression/Recommendations:  1  Febrile neutropenia  Pt had a temp of 100 4  No other fevers since  ANC still low  Low-grade fever may be related to #2  Pt complains of b/l anterior tender lymphadenopathy and nasal congestion  Consideration for viral URI  Has mild cough but not new, and pt recently completed 7 day course of ceftriaxone for possible RUL pneumonia  Also possibility of secondary cellulitis/abscess formation of one of his back lesions  Another consideration in this setting is bacterial translocation from the gut in the setting of neutropenia  Fortunately, blood cultures so far negative      -c/w cefepime for now  -f/u blood cultures  If remain negative and patient remains afebrile, will plan to discontinue cefepime soon   -follow-up respiratory viral panel  -monitor signs/symptoms closely     2  B/l cervical tender lymphadenopathy  Also with c/o nasal congestion  Consider viral etiology, as noted above  No oropharyngeal erythema/exudates noted  May related to episode of low-grade fever  Neck ultrasound showing prominent lymph node with no concern for abscess  -f/u resp viral panel     3  Newly-diagnosed AML  S/p Hydrea with good response  Currently receiving Dacogen  Oncology following closely      4  Diffuse back rash  With concern for possible localized secondary cellulitis/abscess  S/p skin biopsy performed on 2/21 with results still pending  Sweets sx vs leukemic infiltrate per Dermatology  Area seems to be significantly improving on exam   No active drainage seen on exam      -will hold off on additional vancomycin  -surgery to evaluate the lesion in case it requires drainage  -monitor back lesion  If persistent, we may need to ask surgery to evaluate for possible I&D      5  Recent dx of CAP  S/p 7 days of IV ceftriaxone completed on 2/26   No new respiratory symptoms  Will continue to monitor      6  Thrombocytopenia  Relatively stable  C/t monitor closely while on antibiotics        Antibiotics:  Vancomycin/cefepime D4    Spoke with slim attending regarding plan of care  Subjective:  Patient overall feels better  Still has swelling in the throat  No sore throat  No URI symptoms  No cough  Denies fevers, chills, or sweats  Denies nausea, vomiting, or diarrhea  Objective:  Vitals:  HR:  [84-89] 88  Resp:  [18-19] 18  BP: (128-140)/(66-78) 129/78  SpO2:  [94 %-96 %] 94 %  Temp (24hrs), Av 8 °F (37 1 °C), Min:97 9 °F (36 6 °C), Max:99 5 °F (37 5 °C)  Current: Temperature: 98 9 °F (37 2 °C)    Physical Exam:   General:  No acute distress  Psychiatric:  Awake and alert  Pulmonary:  Normal respiratory excursion without accessory muscle use  Abdomen:  Soft, nontender  Extremities:  No edema  Skin:  No rashes  HEENT:  Bilateral anterior cervical lymphadenopathy    Lab Results:  I have personally reviewed pertinent labs  Results from last 7 days  Lab Units 18  0603 18  0643 18  0606  18  0523   SODIUM mmol/L 135* 135* 136  < > 137   POTASSIUM mmol/L 4 0 4 3 4 3  < > 4 2   CHLORIDE mmol/L 104 104 105  < > 106   CO2 mmol/L 23 23 24  < > 22   ANION GAP mmol/L 8 8 7  < > 9   BUN mg/dL 15 16 16  < > 23   CREATININE mg/dL 1 11 1 13 1 10  < > 1 01   EGFR ml/min/1 73sq m 60 59 60  < > 67   GLUCOSE RANDOM mg/dL 117 97 96  < > 106   CALCIUM mg/dL 8 3 8 0* 8 1*  < > 7 7*   AST U/L  --   --   --   --  55*   ALT U/L  --   --   --   --  33   ALK PHOS U/L  --   --   --   --  106   TOTAL PROTEIN g/dL  --   --   --   --  6 8   BILIRUBIN TOTAL mg/dL  --   --   --   --  0 57   < > = values in this interval not displayed      Results from last 7 days  Lab Units 18  1136 18  0603 18  0643   WBC Thousand/uL 22 46* 35 23* 30 57*   HEMOGLOBIN g/dL 7 8* 8 0* 8 3*   PLATELETS Thousands/uL 52* 19* 27*       Results from last  days  Lab Units 03/01/18  1723   BLOOD CULTURE  No Growth at 24 hrs  No Growth at 24 hrs  Imaging Studies:   I have personally reviewed pertinent imaging study reports and images in PACS  Ultrasound of neck shows prominent lymph nodes  No suspicion for abscess  EKG, Pathology, and Other Studies:   I have personally reviewed pertinent reports

## 2018-03-03 NOTE — CONSULTS
Consultation - General Surgery   Geoffrey Conteh 80 y o  male MRN: [id number]  Unit/Bed#: [facility] 630-01 Encounter: [id number]    Assessment/Plan     Assessment:  Pt is an 80y o M with AML s/p skin biopsy by dermatology with redness and induration around biopsy site    Plan:  Post biopsy day 11, suture is over due for removal-suture was removed, no drainage was expressed from wound, 4X4 placed over site to monitor for drainage  ID-on cefepime  Primary care per SLIM    History of Present Illness     HPI:  Geoffrey Conteh is a 80 y o  male w/ pmh diverticulosis, PBH, PAD,  who presented to the hospital with lethargy and weakness, was subsequently found to have AML  We are consulted today for an abscess at a skin biopsy site for possible drainage  Beginning roughly 6 weeks prior to admission he was had a diffuse rash on his back  Dermatology saw the patient on 2/21/18  and took 1-3 mm punch biopsy of his left lower back and placed him on triamcinolone   1% cream BID  The patient states that the pain had been slowly improving then beginning a couple days ago it began to get a little worse  The area is currently indurated, red and tender to touch  There is a single suture in place  He denies any fevers, chills or feeling ill in the last couple days  Currently he being followed by ID and on Cefepime for febrile neutropenia and was on vancomycin that was discontinued today  He denies F/C/N/V, pain at site  He complains of lump on left side of neck, U/S shows it is a prominent lymph node  Inpatient consult to Acute Care Surgery     Date/Time 3/3/2018 5:48 PM     Performed by  Alvin Adams     Authorized by Axel Cruz              Review of Systems   HENT: Negative  Eyes: Negative  Respiratory: Negative  Cardiovascular: Negative  Gastrointestinal: Negative  Endocrine: Negative  Genitourinary: Negative  Musculoskeletal: Negative  Skin: Positive for wound     Neurological: Positive for weakness  Psychiatric/Behavioral: Negative  Historical Information   History reviewed  No pertinent past medical history  Past Surgical History:   Procedure Laterality Date    COLONOSCOPY N/A 2012    COLONOSCOPY  01/2013    No repeat is recommended  Findings were diverticulosis and mild hemorrhoids   CYSTOSCOPY      onset: 05/06/2016; diagnostic    HERNIA REPAIR  12/09/2003    NV ENDOVENOUS LASER, 1ST VEIN Left 1/15/2016    Procedure: ENDOVASCULAR LASER THERAPY (EVLT) with multiple stab phlebectomies-between 10-20;   Surgeon: Kurt Booker MD;  [facility]: [facility];  Service: Vascular    NV PHLEB VEINS - EXTREM - TO 20 Left 1/15/2016    Procedure: LEG ENDOVENOUS LASER OBLITERATION GREATER SAPHENOUS VEIN WITH MULTIPLE VEIN LIGATIONS ;  Surgeon: Kurt Booker MD;  Location: [facility];  Service: Vascular    TONSILLECTOMY N/A      Social History   History   Alcohol Use    Yes     Comment: 4 drinks per month     History   Drug use: Unknown     History   Smoking Status    Never Smoker   Smokeless Tobacco    Never Used     Family History: non-contributory    Meds/Allergies   all current active meds have been reviewed  Allergies   Allergen Reactions    Iodinated Diagnostic Agents      IVP dye       Objective   First Vitals:   Blood Pressure: 130/78 (02/20/18 2035)  Pulse: 84 (02/20/18 2035)  Temperature: 99 9 °F (37 7 °C) (02/20/18 2035)  Temp Source: Oral (02/20/18 2035)  Respirations: 18 (02/20/18 2035)  Height: 5' 10" (177 8 cm) (02/20/18 2035)  Weight - Scale: 73 3 kg (161 lb 9 6 oz) (Standing ) (02/21/18 0902)  SpO2: 95 % (02/20/18 2035)    Current Vitals:   Blood Pressure: 114/67 (03/03/18 1500)  Pulse: 91 (03/03/18 1500)  Temperature: 99 °F (37 2 °C) (03/03/18 1500)  Temp Source: Oral (03/03/18 1500)  Respirations: 20 (03/03/18 1500)  Height: 5' 10" (177 8 cm) (02/27/18 1417)  Weight - Scale: 74 kg (163 lb 2 3 oz) (02/27/18 1417)  SpO2: 93 % (03/03/18 1500)      Intake/Output Summary (Last 24 hours) at 03/03/18 1748  Last data filed at 03/03/18 1500   Gross per 24 hour   Intake             1570 ml   Output             1750 ml   Net             -180 ml       Invasive Devices     Peripheral Intravenous Line            Peripheral IV 02/27/18 Left Forearm 4 days    Peripheral IV 03/03/18 Right Arm less than 1 day                Physical Exam   Constitutional: He is oriented to person, place, and time  He appears well-developed and well-nourished  HENT:   Head: Normocephalic and atraumatic  Eyes: Pupils are equal, round, and reactive to light  Neck: Normal range of motion  Cardiovascular: Normal rate  Pulmonary/Chest: Effort normal  No respiratory distress  Abdominal: Soft  He exhibits no distension  There is no tenderness  Musculoskeletal: He exhibits no edema  Neurological: He is alert and oriented to person, place, and time  Skin: Skin is warm and dry  Back with red rash and blanching erythema lower left back at site of biopsy, suture and small scab at site was removed, no drainage or purulence expressed   Psychiatric: He has a normal mood and affect  Lab Results:   I have personally reviewed pertinent lab results  , CBC:   Lab Results   Component Value Date    WBC 22 46 (H) 03/03/2018    HGB 7 8 (L) 03/03/2018    HCT 22 8 (L) 03/03/2018    MCV 90 03/03/2018    PLT 52 (L) 03/03/2018    MCH 30 7 03/03/2018    MCHC 34 2 03/03/2018    RDW 14 9 03/03/2018    MPV 8 5 (L) 03/03/2018    NRBC 0 03/03/2018   , CMP: No results found for: NA, K, CL, CO2, ANIONGAP, BUN, CREATININE, GLUCOSE, CALCIUM, AST, ALT, ALKPHOS, PROT, ALBUMIN, BILITOT, EGFR  Imaging: I have personally reviewed pertinent reports  EKG, Pathology, and Other Studies: I have personally reviewed pertinent reports

## 2018-03-04 NOTE — PROGRESS NOTES
Progress Note - Quan Lisa 1931, 80 y o  male MRN: [id number]    Unit/Bed#: [facility] 630-01 Encounter: [id number]    Primary Care Provider: Ballard Bence, MD   Date and time admitted to hospital: 2018  8:34 PM    Febrile neutropenia ([facility] [address] )   Assessment & Plan    D/w ID - downgrade Abx  US neck - no abscess; large lymph node  throat swab - negative  CT head - negative  Lt back skin Bx site improving redness - suture removal done  Note cellulitis around suture site - improving          * AML (acute myeloblastic leukemia) ([facility] [address] )   Assessment & Plan    Ongoing chemo per Hemonc  ANC 0  Will continue to monitor for fevers  On prophylactic antivirals  Abx per ID          Thrombocytopenia (HCC)   Assessment & Plan    Platelets [id number]  Monitor daily        Anemia   Assessment & Plan    2" AML - monitor Hb 7 3 - PRBC ordered today  Cbc in AM          VTE Pharmacologic Prophylaxis: Pharmacologic VTE Prophylaxis contraindicated due to low platelets  Mechanical: Mechanical VTE prophylaxis in place  Patient Centered Rounds: I have performed bedside rounds with nursing staff today  Discussions with Specialists or Other Care Team Provider:     Education and Discussions with Family / Patient: family at bedside    Time Spent for Care: More than 50% of total time spent on counseling and coordination of care as described above  Current Length of Stay: 12 day(s)    Current Patient Status: Inpatient   Certification Statement: The patient will continue to require additional inpatient hospital stay due to [facility]    Discharge Plan:    Code Status: Level 3 - DNAR and DNI      Subjective: no new complaints; mild weakness    Objective:     Vitals:   Temp (24hrs), Av 7 °F (37 1 °C), Min:97 2 °F (36 2 °C), Max:99 7 °F (37 6 °C)    HR:  [84-94] 94  Resp:  [18-20] 18  BP: (113-137)/(51-75) 137/67  SpO2:  [92 %-97 %] 97 %  Body mass index is 22 08 kg/m²  Input and Output Summary (last 24 hours):        Intake/Output Summary (Last 24 hours) at 03/04/18 1543  Last data filed at 03/04/18 1419   Gross per 24 hour   Intake             1200 ml   Output             1875 ml   Net             -675 ml       Physical Exam   HENT:   Head: Normocephalic  Eyes: Pupils are equal, round, and reactive to light  Cardiovascular: Normal heart sounds  Pulmonary/Chest: Effort normal    Abdominal: Soft  Neurological: He is alert  Skin: There is pallor  Additional Data:     Labs:      Results from last 7 days  Lab Units 03/04/18  0846   WBC Thousand/uL 42 27*   HEMOGLOBIN g/dL 7 3*   HEMATOCRIT % 21 1*   PLATELETS Thousands/uL 45*   LYMPHO PCT % 18   MONO PCT MAN % 32*   EOSINO PCT MANUAL % 0       Results from last 7 days  Lab Units 03/04/18  0639   SODIUM mmol/L 134*   POTASSIUM mmol/L 3 9   CHLORIDE mmol/L 104   CO2 mmol/L 23   BUN mg/dL 15   CREATININE mg/dL 1 09   CALCIUM mg/dL 8 0*   TOTAL PROTEIN g/dL 7 5   BILIRUBIN TOTAL mg/dL 0 68   ALK PHOS U/L 94   ALT U/L 23   AST U/L 23   GLUCOSE RANDOM mg/dL 98       Results from last 7 days  Lab Units 02/26/18  0523   INR  1 32*       * I Have Reviewed All Lab Data Listed Above  Imaging:  Imaging Personally Reviewed by Myself Includes:     Cultures:   Blood Culture:   Lab Results   Component Value Date    BLOODCX No Growth at 48 hrs  03/01/2018    BLOODCX No Growth at 48 hrs  03/01/2018    BLOODCX No Growth After 5 Days  02/19/2018    BLOODCX No Growth After 5 Days   02/19/2018     Urine Culture: No results found for: URINECX  Sputum Culture: No components found for: SPUTUMCX  Wound Culture: No results found for: WOUNDCULT    Last 24 Hours Medication List:     Current Facility-Administered Medications:  acetaminophen 650 mg Oral Q6H PRN Breanna Pablo MD    acyclovir 400 mg Oral Q12H [address] Brad High MD PhD    allopurinol 100 mg Oral Daily Riley De La Rosa MD    baclofen 5 mg Oral BID [facility]    benzonatate 100 mg Oral TID PRN Breanna Pablo MD    cefepime 2,000 mg Intravenous Q12H Anabel Aldea, DO Last Rate: 2,000 mg (03/04/18 1210)   docusate sodium 100 mg Oral BID Laxmi Tolbert DO    guaiFENesin 600 mg Oral Q12H [address] Angelina Lugo MD    hydrOXYzine HCL 10 mg Oral TID PRN Roberth Talbot [place]    melatonin 3 mg Oral Daily With Poncho Bashir [place]    polyethylene glycol 17 g Oral Daily Laxmi Tolbert DO    triamcinolone  Topical BID Hiral Nallen  Today, Patient Was Seen By: Jaclyn Harley MD    ** Please Note: Dragon 360 Dictation voice to text software may have been used in the creation of this document   **

## 2018-03-04 NOTE — PROGRESS NOTES
Patient tolerated oob to chair all day encouraged incentive spirometer 10x an hour,  PRBC infusing denies abnormal signs or symptoms

## 2018-03-04 NOTE — ASSESSMENT & PLAN NOTE
D/w ID - downgrade Abx  US neck - no abscess; large lymph node  throat swab - negative  CT head - negative  Lt back skin Bx site improving redness - suture removal done  Note cellulitis around suture site - improving

## 2018-03-04 NOTE — PLAN OF CARE
DISCHARGE PLANNING - CARE MANAGEMENT     Discharge to post-acute care or home with appropriate resources Progressing        INFECTION - ADULT     Absence or prevention of progression during hospitalization Progressing     Absence of fever/infection during neutropenic period Progressing        Potential for Falls     Patient will remain free of falls Progressing        Prexisting or High Potential for Compromised Skin Integrity     Skin integrity is maintained or improved Progressing        SKIN/TISSUE INTEGRITY - ADULT     Skin integrity remains intact Progressing          Nutrition/Hydration-ADULT     Nutrient/Hydration intake appropriate for improving, restoring or maintaining nutritional needs Not Progressing

## 2018-03-04 NOTE — ASSESSMENT & PLAN NOTE
Ongoing chemo per Hemonc  ANC 0  Will continue to monitor for fevers  On prophylactic antivirals  Abx per ID

## 2018-03-04 NOTE — PROGRESS NOTES
Patient: Geoffrey Conteh  Patient MRN: [id number]  Service date: 2/25/2018  Attending Physician:       CHIEF COMPLAIN  No chief complaint on file  Heme / Oncology history:  Geoffrey Conteh is a 80 y o  male  With newly diagnosed AML  Received hydroxyurea a 1000 milligrams twice a day from 2/23-2/25  On  R [address] now, currently day 5/5  Pt reported doing well  No fever, chills  No bleeding  HISTORY OF PRESENT ILLNESS:  Geoffrey Conteh is a 80 y o  male who presents      PAST MEDICAL HISTORY:   has no past medical history on file  PAST SURGICAL HISTORY:   has a past surgical history that includes Hernia repair (12/09/2003); Colonoscopy (N/A, 2012); Tonsillectomy (N/A); pr phleb veins - extrem - to 20 (Left, 1/15/2016); pr endovenous laser, 1st vein (Left, 1/15/2016); Colonoscopy (01/2013); and Cystoscopy  CURRENT MEDICATIONS  Scheduled Meds:    Current Facility-Administered Medications:  acetaminophen 650 mg Oral Q6H PRN Angus Blunt MD    acyclovir 400 mg Oral Q12H Eric Coon MD PhD    allopurinol 100 mg Oral Daily Edda Martel MD    benzonatate 100 mg Oral TID PRN Angus Blunt MD    cefTRIAXone 1,000 mg Intravenous Q24H Angus Blunt MD Last Rate: Stopped (02/24/18 1610)   guaiFENesin 600 mg Oral Q12H [facility] Angus Blunt MD    hydroxyurea 1,000 mg Oral Q12H Mary Grace Augustin MD    hydrOXYzine HCL 25 mg Oral Q6H PRN Angus Blunt MD    melatonin 3 mg Oral HS Edda Martel MD    sodium bicarbonate infusion 50 mL/hr Intravenous Continuous Edda Martel MD Last Rate: Stopped (02/24/18 1225)   sodium chloride 50 mL/hr Intravenous Continuous Edda Martel MD Last Rate: Stopped (02/24/18 1225)   triamcinolone  Topical BID Lujean Huber        Continuous Infusions:    sodium bicarbonate infusion 50 mL/hr Last Rate: Stopped (02/24/18 1225)   sodium chloride 50 mL/hr Last Rate: Stopped (02/24/18 1225)     PRN Meds:   acetaminophen    benzonatate    hydrOXYzine HCL    SOCIAL HISTORY: reports that he has never smoked  He has never used smokeless tobacco  He reports that he drinks alcohol  His drug history is not on file  FAMILY HISTORY:  family history includes Coronary artery disease in his father and mother; Heart disease in his father; Stroke in his mother  ALLERGIES:  is allergic to iodinated diagnostic agents  REVIEW OF SYSTEMS:  Please note that a 14-point review of systems was performed to include Constitutional, HEENT, Respiratory, CVS, GI, , Musculoskeletal, Integumentary, Neurologic, Rheumatologic, Endocrinologic, Psychiatric, Lymphatic, and Hematologic/Oncologic systems were reviewed and are negative unless otherwise stated in HPI  Positive and negative findings pertinent to this evaluation are incorporated into the history of present illness  PHYSICAL EXAMINATION:  Vital Signs: Temp:  [97 5 °F (36 4 °C)-99 °F (37 2 °C)] 98 6 °F (37 °C)  HR:  [76-98] 76  Resp:  [18-21] 21  BP: (104-128)/(58-71) 104/64  Body mass index is 23 19 kg/m²  Body surface area is 1 91 meters squared  Constitutional: Alert and oriented  HEENT: Anicteric, PERRLA  Chest: Decreased breathing sound bilaterally, No wheezes/rales/rhonchi  CVS: Regular rhythm  Normal rate  Abdomen: Soft, nontender, nondistended  Bowel sounds positive X 4  No palpable organomegaly  Back: diffuse erythematous rash  Left flank mass 6 cm in diameter  Extremities: No cyanosis/clubbing/edema  Integumentary: No obvious rashes or bruises  Musculoskeletal: No obvious bony or joint deformities  Psychiatric: Appropriate affect and mood  Lymph Node Survey: No palpable preauricular, submandibular, cervical, supraclavicular, axillary, epitrochlear or inguinal lymphadenopathy      LABS:    Results from last 7 days  Lab Units 02/25/18  0604 02/24/18  2155 02/24/18  0521 02/23/18  1706   WBC Thousand/uL 56 11* 90 45* 106 65* 123 30*   HEMATOCRIT % 22 8* 23 4* 18 7* 20 5*   PLATELETS Thousands/uL 25* 27* 31* 31*   MONO PCT MAN % 62*  --  58* 69*       Results from last 7 days  Lab Units 02/25/18  0604 02/24/18  0521 02/23/18  0604   SODIUM mmol/L 135* 139 134*   POTASSIUM mmol/L 4 1 4 0 3 9   CHLORIDE mmol/L 106 107 106   CO2 mmol/L 21 22 21   BUN mg/dL 18 12 12       Results from last 7 days  Lab Units 02/25/18  0604 02/24/18  0521 02/23/18  0604  02/20/18  0606 02/19/18  1642   PHOSPHORUS mg/dL 3 8 3 2 1 6*  < > 2 7  --    MAGNESIUM mg/dL 2 2 2 1 2 0  < > 2 0  --    ALBUMIN g/dL  --   --   --   --  2 5* 3 2*   BILIRUBIN TOTAL mg/dL  --   --   --   --  0 66 0 75   ALK PHOS U/L  --   --   --   --  66 79   ALT U/L  --   --   --   --  24 25   AST U/L  --   --   --   --  37 40   < > = values in this interval not displayed      Results from last 7 days  Lab Units 02/20/18  0607 02/19/18  2310   INR  1 39* 1 37*   PTT seconds 41* 46*           Invalid input(s): TNI,  PCT      Results from last 7 days  Lab Units 02/20/18  0606   LD U/L 486*             IMAGING  IR bone marrow biopsy/aspiration   Final Result   Impression:      Technically successful image guided bone marrow aspiration and core biopsy         Workstation performed: [id number]         :      PROBLEM LIST:    Patient Active Problem List   Diagnosis    Allergic rhinitis    Atopic dermatitis    Balance problems    Enlarged prostate without lower urinary tract symptoms (luts)    Erectile dysfunction    Folliculitis    Hematuria, gross    Hyperglycemia    Melanoma in situ ([facility] [address] )    Peripheral arterial disease (HCC)    Peripheral neuropathy    Peripheral vascular disease (HCC)    Lethargic    Myalgia    Fever    Monocytosis    Generalized skin papules    SIRS (systemic inflammatory response syndrome) (HCC)    Lethargy    Anemia    Thrombocytopenia (HCC)    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([address] )    MARIA VICTORIA (acute kidney injury) ([facility] [address] )    CAP (community acquired pneumonia)    AML (acute myeloblastic leukemia) ([facility] [address] )    Skin abnormality       ASSESSMENT/PLAN:  Geoffrey bradley a 80 y o  male with:    1) AML:  - received hydrea  Last day 5/5 Decagen on 3/3  - tollerating well  - continue allopurinol 100 mg  will check uric acid in Am  - continue acyclovir 400 mg po bid  - given the WBC is elevating again, he will likely need another course of Decagen  I will discuss with primary oncologist on Monday and make decision tomorrow  - will need follow up as out pt in [facility]  I will update Dr Eleazar Sun on Monday and make further plan  - possible D/C on Monday with twice weekly lab and possible transfusion  2) pancytopenia   - anemia : 2nd to cancer  Transfuse on 3/4 1 u    - platelet  : receiving one unit today  - close monitor, keep Hb > 8 and plt > 20k  - plt transfusion 3/2      3) back lump : likely hematoma  - warm patch  - Hb is stable  4) ? Infection   - neck u/s  : no abscess         Colletta Spells MD PhD  Hematology / Oncology

## 2018-03-04 NOTE — PROGRESS NOTES
Patient tolerating oob to chair chair alarm on call bell and phone within reach, encouraged incentive spirometer 10x an hour

## 2018-03-05 NOTE — RESTORATIVE TECHNICIAN NOTE
Restorative Specialist Mobility Note       Activity: Other (Comment) (Educated/encouraged pt to ambulate with assistance 3-4 x's/day, pt refused   Pt callbell, phone/tray within reach )    Sandeep PENA, Restorative Technician, [employer]

## 2018-03-05 NOTE — PLAN OF CARE
Problem: OCCUPATIONAL THERAPY ADULT  Goal: Performs self-care activities at highest level of function for planned discharge setting  See evaluation for individualized goals  Treatment Interventions: ADL retraining, Functional transfer training, UE strengthening/ROM, Endurance training, Patient/family training, Equipment evaluation/education, Compensatory technique education, Energy conservation, Activityengagement          See flowsheet documentation for full assessment, interventions and recommendations  Limitation: Decreased ADL status, Decreased UE strength, Decreased Safe judgement during ADL, Decreased endurance, Decreased self-care trans, Decreased high-level ADLs  Prognosis: Fair  Assessment: Pt is a 81 yo M transfer to [facility] from [facility] on 2/20 w/ lethargy, fevers, cough dx'd w/ acute myeloblastic leukemia  Pt w/ PMHx significant for squamous cell ca, melanoma  Pt w/ active OT orders and up w/ assist orders  PTA, pt was living w/ spouse in a 1 story house  Pt was Ind w/ ADLs, IADLs, driving, no use of AD at baseline  Pt has no hx of falls  At time of OT eval, pt requiring SBA for grooming, Min A UB self-care, Mod A LB self-care, Mod A transfers, Min A functional mobility w/ Rw  Pt demonstrating deficits in baseline areas of occupation including decreased ADL performance, IADL performance, and functional mobility 2* generalized weakness, medical status, slowed mobility, decreased balance, decreased strength  Pt scored a 50/100 on the Barthel Index and has been identified as a high complexity evaluation  OT will continue to follow pt 3-5x/wk for 10-14 days to promote increased independence w/ ADLs and functional mobility to meet below stated goals  From OT standpoint, anticipate d/c to STR to continue to address safety and independence w/ ADLs, IADLs, and functional mobility       OT Discharge Recommendation: Short Term Rehab  OT - OK to Discharge: Yes (to STR when medically ready)

## 2018-03-05 NOTE — TELEPHONE ENCOUNTER
Called and spoke to Dr Michael Garcia and he stated he will call Christine and update her on the patient's condition

## 2018-03-05 NOTE — PLAN OF CARE
Problem: PHYSICAL THERAPY ADULT  Goal: Performs mobility at highest level of function for planned discharge setting  See evaluation for individualized goals  Treatment/Interventions: Functional transfer training, LE strengthening/ROM, Therapeutic exercise, Endurance training, Patient/family training, Equipment eval/education, Bed mobility, Gait training  Equipment Recommended: Petra Haynes       See flowsheet documentation for full assessment, interventions and recommendations  Prognosis: Fair  Problem List: Decreased strength, Decreased endurance, Impaired balance, Decreased mobility, Decreased safety awareness  Assessment: Pt is a 80 y o  male seen for PT evaluation s/p admit to [facility] on [facility]/20/2018 w/ AML [facility]  Order placed for PT  Comorbidities affecting pt's physical performance at time of assessment listed above  Personal factors affecting pt at time of IE include: steps to enter environment, limited home support, advanced age, inability to perform IADLs, inability to perform ADLs, inability to ambulate household distances and limited insight into impairments  Prior to admission, pt was was independent w/ all functional mobility w/ out AD, ambulated community distances and elevations, lived in one floor environment, had 1 [address] no railing and lived with wife  Upon evaluation: Pt requires mod A for sit to stand transfers and min A for ambulation with RW  Narrow M[place] and decreased step length noted during trial   Pt requires significant encouragement to participate with limited insight into deficits noted  Pt reports, "I don't think I'm going to be up for this for the next couple of days "  Pt educated on importance of mobility  (Please find full objective findings from PT assessment regarding body systems outlined above)   Impairments and limitations also listed above, especially due to  weakness, impaired balance, decreased endurance, gait deviations, decreased activity tolerance, decreased safety awareness and fall risk  The following objective measures performed on IE also reveal limitations: Barthel Index 55/100  Pt's clinical presentation is currently unstable/unpredictable seen in pt's presentation of decreased safety awareness and lack of insight into deficits  Pt to benefit from continued skilled PT tx while in hospital and upon DC to address deficits as defined above and maximize level of functional mobility  From PT/mobility standpoint, recommendation at time of d/c would be inpatient rehab pending progress in order to maximize pt's functional independence and consistency w/ mobility in order to facilitate return to PLOF  Recommend progression of ambulation, initiation of HEP, and dynamic balance activities  Recommendation: Post acute IP rehab          See flowsheet documentation for full assessment

## 2018-03-05 NOTE — PROGRESS NOTES
Patient: Reji Trinh  Patient MRN: [id number]  Service date: 2/25/2018  Attending Physician:       CHIEF COMPLAIN  No chief complaint on file  Heme / Oncology history:  Reji Trinh is a 80 y o  male  With newly diagnosed AML  Received hydroxyurea a 1000 milligrams twice a day from 2/23-2/25  On  R [address] now, currently day 5/5  Pt reported doing well  No fever, chills  No bleeding  HISTORY OF PRESENT ILLNESS:  Reji Trinh is a 80 y o  male who presents      PAST MEDICAL HISTORY:   has no past medical history on file  PAST SURGICAL HISTORY:   has a past surgical history that includes Hernia repair (12/09/2003); Colonoscopy (N/A, 2012); Tonsillectomy (N/A); pr phleb veins - extrem - to 20 (Left, 1/15/2016); pr endovenous laser, 1st vein (Left, 1/15/2016); Colonoscopy (01/2013); and Cystoscopy  CURRENT MEDICATIONS  Scheduled Meds:    Current Facility-Administered Medications:  acetaminophen 650 mg Oral Q6H PRN Minerva Dong MD    acyclovir 400 mg Oral Q12H Mervat Mejias MD PhD    allopurinol 100 mg Oral Daily Caitlin Meyers MD    benzonatate 100 mg Oral TID PRN Minerva Dong MD    cefTRIAXone 1,000 mg Intravenous Q24H Minerva Dong MD Last Rate: Stopped (02/24/18 1610)   guaiFENesin 600 mg Oral Q12H [facility] Minerva Dong MD    hydroxyurea 1,000 mg Oral Q12H Mary Grace Augustin MD    hydrOXYzine HCL 25 mg Oral Q6H PRN Minerva Dong MD    melatonin 3 mg Oral HS Caitlin Meyers MD    sodium bicarbonate infusion 50 mL/hr Intravenous Continuous Caitlin Meyers MD Last Rate: Stopped (02/24/18 1225)   sodium chloride 50 mL/hr Intravenous Continuous Caitlin Meyers MD Last Rate: Stopped (02/24/18 1225)   triamcinolone  Topical BID Mick Lek        Continuous Infusions:    sodium bicarbonate infusion 50 mL/hr Last Rate: Stopped (02/24/18 1225)   sodium chloride 50 mL/hr Last Rate: Stopped (02/24/18 1225)     PRN Meds:   acetaminophen    benzonatate    hydrOXYzine HCL    SOCIAL HISTORY: reports that he has never smoked  He has never used smokeless tobacco  He reports that he drinks alcohol  His drug history is not on file  FAMILY HISTORY:  family history includes Coronary artery disease in his father and mother; Heart disease in his father; Stroke in his mother  ALLERGIES:  is allergic to iodinated diagnostic agents  REVIEW OF SYSTEMS:  Please note that a 14-point review of systems was performed to include Constitutional, HEENT, Respiratory, CVS, GI, , Musculoskeletal, Integumentary, Neurologic, Rheumatologic, Endocrinologic, Psychiatric, Lymphatic, and Hematologic/Oncologic systems were reviewed and are negative unless otherwise stated in HPI  Positive and negative findings pertinent to this evaluation are incorporated into the history of present illness  PHYSICAL EXAMINATION:  Vital Signs: Temp:  [97 5 °F (36 4 °C)-99 °F (37 2 °C)] 98 6 °F (37 °C)  HR:  [76-98] 76  Resp:  [18-21] 21  BP: (104-128)/(58-71) 104/64  Body mass index is 23 19 kg/m²  Body surface area is 1 91 meters squared  Constitutional: Alert and oriented  HEENT: Anicteric, PERRLA  Chest: Decreased breathing sound bilaterally, No wheezes/rales/rhonchi  CVS: Regular rhythm  Normal rate  Abdomen: Soft, nontender, nondistended  Bowel sounds positive X 4  No palpable organomegaly  Back: diffuse erythematous rash  Left flank mass 6 cm in diameter  Extremities: No cyanosis/clubbing/edema  Integumentary: No obvious rashes or bruises  Musculoskeletal: No obvious bony or joint deformities  Psychiatric: Appropriate affect and mood  Lymph Node Survey: No palpable preauricular, submandibular, cervical, supraclavicular, axillary, epitrochlear or inguinal lymphadenopathy      LABS:    Results from last 7 days  Lab Units 02/25/18  0604 02/24/18  2155 02/24/18  0521 02/23/18  1706   WBC Thousand/uL 56 11* 90 45* 106 65* 123 30*   HEMATOCRIT % 22 8* 23 4* 18 7* 20 5*   PLATELETS Thousands/uL 25* 27* 31* 31*   MONO PCT MAN % 62*  --  58* 69*       Results from last 7 days  Lab Units 02/25/18  0604 02/24/18  0521 02/23/18  0604   SODIUM mmol/L 135* 139 134*   POTASSIUM mmol/L 4 1 4 0 3 9   CHLORIDE mmol/L 106 107 106   CO2 mmol/L 21 22 21   BUN mg/dL 18 12 12       Results from last 7 days  Lab Units 02/25/18  0604 02/24/18  0521 02/23/18  0604  02/20/18  0606 02/19/18  1642   PHOSPHORUS mg/dL 3 8 3 2 1 6*  < > 2 7  --    MAGNESIUM mg/dL 2 2 2 1 2 0  < > 2 0  --    ALBUMIN g/dL  --   --   --   --  2 5* 3 2*   BILIRUBIN TOTAL mg/dL  --   --   --   --  0 66 0 75   ALK PHOS U/L  --   --   --   --  66 79   ALT U/L  --   --   --   --  24 25   AST U/L  --   --   --   --  37 40   < > = values in this interval not displayed      Results from last 7 days  Lab Units 02/20/18  0607 02/19/18  2310   INR  1 39* 1 37*   PTT seconds 41* 46*           Invalid input(s): TNI,  PCT      Results from last 7 days  Lab Units 02/20/18  0606   LD U/L 486*             IMAGING  IR bone marrow biopsy/aspiration   Final Result   Impression:      Technically successful image guided bone marrow aspiration and core biopsy         Workstation performed: [id number]         :      PROBLEM LIST:    Patient Active Problem List   Diagnosis    Allergic rhinitis    Atopic dermatitis    Balance problems    Enlarged prostate without lower urinary tract symptoms (luts)    Erectile dysfunction    Folliculitis    Hematuria, gross    Hyperglycemia    [facility]oma [facility]    Peripheral arterial disease (HCC)    Peripheral neuropathy    Peripheral vascular disease (HCC)    Lethargic    Myalgia    Fever    Monocytosis    Generalized skin papules    SIRS (systemic inflammatory response syndrome) (HCC)    Lethargy    Anemia    Thrombocytopenia (HCC)    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([address] )    MARIA VICTORIA (acute kidney injury) ([facility] )    CAP (community acquired pneumonia)    AML (acute myeloblastic leukemia) ([facility] )    Skin abnormality       ASSESSMENT/PLAN:  Boby Mcdonnell is a 80 y o  male with:    1) AML:  - received hydrea  Last day 5/5 Decagen on 3/3  - tollerating well  - continue allopurinol 100 mg  will check uric acid in Am  - continue acyclovir 400 mg po bid  - given the WBC is elevating, it suggesting the disease is aggressive, options will be another 5 days of Decagen, or hydrea and f/u as out-pt, or hospice  -  We will have a family meeting to re-discuss  2) pancytopenia   - anemia : 2nd to cancer  Transfuse on 3/4 1 u    - platelet  : receiving one unit today  - close monitor, keep Hb > 8 and plt > 20k  - plt transfusion 3/2      3) back lump : likely hematoma  - warm patch  - Hb is stable  4) ? Infection   - neck u/s  : no abscess         Tae Fields MD PhD  Hematology / Oncology

## 2018-03-05 NOTE — SOCIAL WORK
DEMETRIUSW received a phone call from daughter Elsie Cody [phone] (h) [phone] (c)  Elsie Cody states that a family meeting would be extremely helpful being that they are feeling confused  They requested the SLIM attending, oncology, and Palliative Medicine  The meeting will take place in patient's room at 1:00  Patient, wife, Achilles Listen, and Halley Sanchez (via phone) will be present

## 2018-03-05 NOTE — ASSESSMENT & PLAN NOTE
No further fevers  ANC 0  US neck - no abscess; large lymph node  throat swab - negative  CT head - negative  Lt back skin Bx site improving redness - suture removal done  Note cellulitis around suture site - improving

## 2018-03-05 NOTE — OCCUPATIONAL THERAPY NOTE
Occupational Therapy Evaluation      Asia Atkinson    3/5/2018    Patient Active Problem List   Diagnosis    Allergic rhinitis    Atopic dermatitis    Balance problems    Enlarged prostate without lower urinary tract symptoms (luts)    Erectile dysfunction    Folliculitis    Hematuria, gross    Hyperglycemia    [facility]    Peripheral arterial disease ([facility] )    Peripheral neuropathy    Peripheral vascular disease ([facility]    Lethargic    Myalgia    Fever    Monocytosis    Generalized skin papules    SIRS (systemic inflammatory response syndrome) (HCC)    Lethargy    Anemia    Thrombocytopenia (HCC)    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([facility]    MARIA VICTORIA (acute kidney injury) ([facility] )    CAP (community acquired pneumonia)    AML (acute myeloblastic leukemia) ([facility] )    Skin abnormality    Febrile neutropenia (HCC)       History reviewed  No pertinent past medical history  Past Surgical History:   Procedure Laterality Date    COLONOSCOPY N/A 2012    COLONOSCOPY  01/2013    No repeat is recommended  Findings were diverticulosis and mild hemorrhoids   CYSTOSCOPY      onset: 05/06/2016; diagnostic    HERNIA REPAIR  12/09/2003    KS ENDOVENOUS LASER, 1ST VEIN Left 1/15/2016    Procedure: ENDOVASCULAR LASER THERAPY (EVLT) with multiple stab phlebectomies-between 10-20; Surgeon: Isidro Escalera MD;  [facility]: [facility];  Service: Vascular    KS PHLEB VEINS - EXTREM - TO 20 Left 1/15/2016    Procedure: LEG ENDOVENOUS LASER OBLITERATION GREATER SAPHENOUS VEIN WITH MULTIPLE VEIN LIGATIONS ;  Surgeon: Isidro Escalera MD;  [facility]: [facility];  Service: Vascular    TONSILLECTOMY N/A       03/05/18 1436   Note Type   Note type Eval/Treat   Restrictions/Precautions   Weight Bearing Precautions Per Order No   Other Precautions Chair Alarm; Fall Risk  (Neutropenic precautions)   Pain Assessment   Pain Assessment No/denies pain   Pain Score No Pain   Home Living   Type of [address] [place] [address] One level  (1 [address])   Prior Function   Level of [place] Independent with ADLs and functional mobility   Lives With Spouse   Receives Help From Family   ADL Assistance Independent   IADLs Independent   Falls in the last 6 months 0   Vocational Retired   Lifestyle   Autonomy Pt was Ind w/ ADLs, IADLs, driving PTA  Reciprocal Relationships Pt has family support  Service to Others Pt is retired  Intrinsic Gratification Pt enjoys [employer]  Psychosocial   Psychosocial (WDL) WDL   Subjective   Subjective "I'm not feeling up to getting up today"   ADL   Where Assessed Chair   Eating Assistance 5  Supervision/Setup   Grooming Assistance 5  Supervision/Setup   UB Bathing Assistance 4  Minimal Assistance   LB Bathing Assistance 3  Moderate Assistance   [address] 4  Minimal [address] 3  Moderate [address]  3  Moderate Assistance   Functional Assistance 4  Minimal Assistance   Bed Mobility   Additional Comments pt assessed in chair   Transfers   Sit to Stand 3  Moderate assistance   Additional items Assist x 1; Increased time required;Verbal cues;Armrests   Stand to Sit 3  Moderate assistance   Additional items Assist x 1; Increased time required;Armrests   Toilet transfer 3  Moderate assistance   Additional items Assist x 1; Increased time required;Standard toilet;Verbal cues   Functional Mobility   Functional Mobility 4  Minimal assistance   Additional Comments VC for RW management   Additional items Rolling walker   Balance   Static Sitting Fair   Dynamic Sitting Fair -   Static Standing Fair -   Dynamic Standing Poor +   Ambulatory Poor +   Activity Tolerance   Activity Tolerance Patient limited by fatigue   Medical Staff Made Aware PT [facility]   Nurse Made Aware ok to see per RN   RUE Assessment   RUE Assessment WFL   LUE Assessment   LUE Assessment WFL   Hand Function   Gross Motor Coordination Functional   Fine Motor Coordination Functional   Cognition Overall Cognitive Status WFL   Arousal/Participation Cooperative   Attention Within functional limits   Orientation Level Oriented X4   Memory Within functional limits   Following Commands Follows one step commands with increased time or repetition   Assessment   Limitation Decreased ADL status; Decreased UE strength;Decreased Safe judgement during ADL;Decreased endurance;Decreased self-care trans;Decreased high-level ADLs   Prognosis Fair   Assessment Pt is a 81 yo M transfer to [facility] from [facility] on 2/20 w/ lethargy, fevers, cough dx'd w/ acute myeloblastic leukemia  Pt w/ PMHx significant for squamous cell ca, melanoma  Pt w/ active OT orders and up w/ assist orders  PTA, pt was living w/ spouse in a 1 story house  Pt was Ind w/ ADLs, IADLs, driving, no use of AD at baseline  Pt has no hx of falls  At time of OT eval, pt requiring SBA for grooming, Min A UB self-care, Mod A LB self-care, Mod A transfers, Min A functional mobility w/ Rw  Pt demonstrating deficits in baseline areas of occupation including decreased ADL performance, IADL performance, and functional mobility 2* generalized weakness, medical status, slowed mobility, decreased balance, decreased strength  Pt scored a 50/100 on the Barthel Index and has been identified as a high complexity evaluation  OT will continue to follow pt 3-5x/wk for 10-14 days to promote increased independence w/ ADLs and functional mobility to meet below stated goals  From OT standpoint, anticipate d/c to STR to continue to address safety and independence w/ ADLs, IADLs, and functional mobility  Goals   Patient Goals to go to rehab   LTG Time Frame 10-14   Plan   Treatment Interventions ADL retraining;Functional transfer training;UE strengthening/ROM; Endurance training;Patient/family training;Equipment evaluation/education; Compensatory technique education; Energy conservation; Activityengagement   Goal Expiration Date 03/19/18   OT Frequency 3-5x/wk   Recommendation   OT Discharge Recommendation Short Term Rehab   OT - OK to Discharge Yes  (to STR when medically ready)   Barthel Index   Feeding 10   Bathing 0   Grooming Score 5   Dressing Score 5   Bladder Score 10   Bowels Score 10   Toilet Use Score 5   Transfers (Bed/Chair) Score 5   Mobility (Level Surface) Score 0   Stairs Score 0   Barthel Index Score 50   Modified Carlton Scale   Modified Carlton Scale 4   Goals to be met in 10-14 days:    Pt will complete transfers w/ Mod I to facilitate independence and participation in ADLs and IADLs  Pt will perform grooming tasks while standing at the sink w/ G balance w/ Mod I  Pt will complete UB/LB self-care activities w/ G balance w/ Mod I  Pt will complete toileting tasks w/ Mod I w/ G hygiene/thoroughness to promote increased independence  Pt will complete simulated IADL tasks including functional reaching while demonstrating G balance w/ Mod I  Pt will demonstrate G standing balance and activity tolerance for 15 min during ADL and leisure tasks w/ Mod I  Pt will demonstrate G safety and problem solving while completing ADL tasks w/ Mod I      Documentation Completed by Delores Burger MS, OTR/L

## 2018-03-05 NOTE — PHYSICAL THERAPY NOTE
PHYSICAL THERAPY EVALUATION  NAME: Reji Trinh  AGE:   80 y o  MRN:  [id number]  ADMIT DX: AML (acute myel[facility]) ([facility] ) [C92 00]    PMH: History reviewed  No pertinent past medical history  LENGTH OF STAY: 13 03/05/18 1437   Pain Assessment   Pain Assessment No/denies pain   Pain Score No Pain   Home Living   Type of [address] One level; Able to live on main level with bedroom/bathroom  ([address])   Additional Comments Pt ambulates independently without AD at baseline  Prior Function   Level of [place] Independent with ADLs and functional mobility   Lives With Spouse   Receives Help From Family   ADL Assistance Independent   IADLs Independent   Falls in the last 6 months 0   Vocational Retired   Restrictions/Precautions   Wells Syd Bearing Precautions Per Order No   Other Precautions Chair Alarm  (neutropenic precautions)   General   Family/Caregiver Present No   Cognition   Overall Cognitive Status WFL   Arousal/Participation Cooperative   Attention Within functional limits   Orientation Level Oriented X4   Memory Within functional limits   Following Commands Follows one step commands with increased time or repetition   RLE Assessment   RLE Assessment X   Strength RLE   RLE Overall Strength 4/5  (grossly)   LLE Assessment   LLE Assessment X   Strength LLE   LLE Overall Strength 4/5  (grossly)   Bed Mobility   Supine to Sit Unable to assess  (OOB in chair prior to mobility with chair alarm intact)   Sit to Supine Unable to assess  (left OOB on toilet with red call bell string in hand )   Transfers   Sit to Stand 3  Moderate assistance  (x2 attempts)   Additional items Assist x 1; Increased time required;Verbal cues   Stand to Sit 3  Moderate assistance   Additional items Assist x 1; Increased time required;Verbal cues   Stand pivot 4  Minimal assistance   Additional items Assist x 1; Increased time required;Verbal cues  (with RW)   Toilet transfer 3  Moderate assistance Additional items Assist x 1; Increased time required; Impulsive;Verbal cues;Standard toilet   Ambulation/Elevation   Gait pattern Improper Weight shift;Narrow MÓNICA; Decreased foot clearance; Short stride; Excessively slow   Gait Assistance 4  Minimal assist   Additional items Assist x 1;Verbal cues   Assistive Device Rolling walker   Distance 25` x1   Balance   Static Sitting Fair   Dynamic Sitting Fair -   Static Standing Fair -   Dynamic Standing Poor +   Ambulatory Poor +   Endurance Deficit   Endurance Deficit Yes   Endurance Deficit Description limited ambulation distance, reports fatigue   Activity Tolerance   Activity Tolerance Patient limited by fatigue   Nurse Made Aware Per RN Malia Florentino, pt appropriate to evaluate   Assessment   Prognosis Fair   Problem List Decreased strength;Decreased endurance; Impaired balance;Decreased mobility; Decreased safety awareness   Goals   Patient Goals none stated   STG Expiration Date 03/14/18   Short Term Goal #1 Pt will be able to: (1) perform bed mobility with mod I (2) perform sit to stand with SBA (3) ambulate 300` with SBA and use of least restrictive AD (4) initiate HEP    Treatment Day 0   Plan   Treatment/Interventions Functional transfer training;LE strengthening/ROM; Therapeutic exercise; Endurance training;Patient/family training;Equipment eval/education; Bed mobility;Gait training   PT Frequency 5x/wk   Recommendation   Recommendation Post acute IP rehab   Equipment Recommended Walker   Barthel Index   Feeding 10   Bathing 0   Grooming Score 5   Dressing Score 5   Bladder Score 10   Bowels Score 10   Toilet Use Score 5   Transfers (Bed/Chair) Score 10   Mobility (Level Surface) Score 0   Stairs Score 0   Barthel Index Score 55       Assessment: Pt is a 80 y o  male seen for PT evaluation s/p admit to [facility] on 2/20/2018 w/ AML (acute myeloblastic leukemia) ([facility] )  Order placed for PT   Comorbidities affecting pt's physical performance at time of assessment listed above  Personal factors affecting pt at time of IE include: steps to enter environment, limited home support, advanced age, inability to perform IADLs, inability to perform ADLs, inability to ambulate household distances and limited insight into impairments  Prior to admission, pt was was independent w/ all functional mobility w/ out AD, ambulated community distances and elevations, lived in one floor environment, had 1 [address] no railing and lived with wife  Upon evaluation: Pt requires mod A for sit to stand transfers and min A for ambulation with RW  Narrow MÓNICA and decreased step length noted during trial   Pt requires significant encouragement to participate with limited insight into deficits noted  Pt reports, "I don't think I'm going to be up for this for the next couple of days "  Pt educated on importance of mobility  (Please find full objective findings from PT assessment regarding body systems outlined above)  Impairments and limitations also listed above, especially due to  weakness, impaired balance, decreased endurance, gait deviations, decreased activity tolerance, decreased safety awareness and fall risk  The following objective measures performed on IE also reveal limitations: Barthel Index 55/100  Pt's clinical presentation is currently unstable/unpredictable seen in pt's presentation of decreased safety awareness and lack of insight into deficits  Pt to benefit from continued skilled PT tx while in hospital and upon DC to address deficits as defined above and maximize level of functional mobility  From PT/mobility standpoint, recommendation at time of d/c would be inpatient rehab pending progress in order to maximize pt's functional independence and consistency w/ mobility in order to facilitate return to PLOF  Recommend progression of ambulation, initiation of HEP, and dynamic balance activities        Nadia Duff, PT,DPT

## 2018-03-05 NOTE — PROGRESS NOTES
Progress Note - Asia Atkinson 1931, 80 y o  male MRN: [id number]    Unit/Bed#: [facility] 630-01 Encounter: [id number]    Primary Care Provider: Rosalva Hook MD   Date and time admitted to hospital: 2018  8:34 PM      Febrile neutropenia ([facility] )   Assessment & Plan    No further fevers  ANC 0  US neck - no abscess; large lymph node  throat swab - negative  CT head - negative  Lt back skin Bx site improving redness - suture removal done  Note cellulitis around suture site - improving          * AML (acute myeloblastic leukemia) ([facility] )   Assessment & Plan    Increased WCC inspite of 5 doses dacogen  Hemonc will decide next step  ANC 0  Will continue to monitor for fevers  On prophylactic antivirals  Abx per ID          Thrombocytopenia (HCC)   Assessment & Plan    Transfuse platelets for <81        Anemia   Assessment & Plan    2" AML - monitor Hb 7 9  Cbc in AM          VTE Pharmacologic Prophylaxis: Pharmacologic VTE Prophylaxis contraindicated due to low platelets  Mechanical: Mechanical VTE prophylaxis in place  Patient Centered Rounds: I have performed bedside rounds with nursing staff today  Discussions with Specialists or Other Care Team Provider:     Education and Discussions with Family / Patient: called and updated son in law over phone    Time Spent for Care: More than 50% of total time spent on counseling and coordination of care as described above      Current Length of Stay: 13 day(s)    Current Patient Status: Inpatient   Certification Statement: The patient will continue to require additional inpatient hospital stay due to all above    Discharge Plan:    Code Status: Level 3 - DNAR and DNI      Subjective: no new complaints, weakness++; pain rt back suture site+    Objective:     Vitals:   Temp (24hrs), Av 2 °F (37 3 °C), Min:98 2 °F (36 8 °C), Max:99 9 °F (37 7 °C)    HR:  [84-97] 92  Resp:  [18] 18  BP: (111-128)/(56-76) 126/76  SpO2:  [93 %-95 %] 95 %  Body mass index is 22 08 kg/m²  Input and Output Summary (last 24 hours): Intake/Output Summary (Last 24 hours) at 03/05/18 1831  Last data filed at 03/05/18 1425   Gross per 24 hour   Intake              650 ml   Output             1130 ml   Net             -480 ml       Physical Exam   HENT:   Head: Normocephalic  Eyes: Pupils are equal, round, and reactive to light  Cardiovascular: Normal heart sounds  Abdominal: Soft  Musculoskeletal: Normal range of motion  Neurological: He is alert  Skin: There is pallor  Additional Data:     Labs:      Results from last 7 days  Lab Units 03/05/18  0544   WBC Thousand/uL 50 77*   HEMOGLOBIN g/dL 7 9*   HEMATOCRIT % 23 5*   PLATELETS Thousands/uL 39*   LYMPHO PCT % 12*   MONO PCT MAN % 57*   EOSINO PCT MANUAL % 0       Results from last 7 days  Lab Units 03/05/18  0544   SODIUM mmol/L 134*   POTASSIUM mmol/L 4 1   CHLORIDE mmol/L 103   CO2 mmol/L 22   BUN mg/dL 20   CREATININE mg/dL 1 07   CALCIUM mg/dL 8 3   TOTAL PROTEIN g/dL 7 6   BILIRUBIN TOTAL mg/dL 0 60   ALK PHOS U/L 100   ALT U/L 21   AST U/L 26   GLUCOSE RANDOM mg/dL 96           * I Have Reviewed All Lab Data Listed Above  Imaging:  Imaging Personally Reviewed by Myself Includes:     Cultures:   Blood Culture:   Lab Results   Component Value Date    BLOODCX No Growth at 72 hrs  03/01/2018    BLOODCX No Growth at 72 hrs  03/01/2018    BLOODCX No Growth After 5 Days  02/19/2018    BLOODCX No Growth After 5 Days   02/19/2018     Urine Culture: No results found for: URINECX  Sputum Culture: No components found for: SPUTUMCX  Wound Culture: No results found for: WOUNDCULT    Last 24 Hours Medication List:     Current Facility-Administered Medications:  acetaminophen 650 mg Oral Q6H PRN Minerva Dong MD   acyclovir 400 mg Oral Q12H [facility] HOME Altagracia Haider MD PhD   allopurinol 100 mg Oral Daily Caitlin Meyers MD   baclofen 5 mg Oral BID Willie Tammy Lunenburg   benzonatate 100 mg Oral TID PRN Minerva Dong MD   [START ON 3/6/2018] cephalexin 500 mg Oral Q12H [facility] Anabel Hogan,    docusate sodium 100 mg Oral BID Laxmi Tolbert DO   guaiFENesin 600 mg Oral Q12H [facility] Kaylin Yen MD   hydrOXYzine HCL 10 mg Oral TID PRN David Jacinto [place]   melatonin 3 mg Oral Daily With Donne [place]   polyethylene glycol 17 g Oral Daily Laxmi Tolbert DO   triamcinolone  Topical BID Lulu Arcos  Today, Patient Was Seen By: Kristen Hill MD    ** Please Note: Dragon 360 Dictation voice to text software may have been used in the creation of this document   **

## 2018-03-05 NOTE — SOCIAL WORK
Met with patient to provide emotional support  Patient is very sleepy and struggles to keep his eyes open or have a conversation  OT came to do an assessment/wash-up patient but he declined  Wife Ghanshyam Zambrano called patient, he was physically to tired to answer the phone  LSW answered and spoke to Ghanshyam Zambrano  Per Ghanshyam Zambrano both her and patient are very depressed after being told by Oncology that the treatments were not successful  LSW was not able to address this statement but could discuss the depression  Ghanshyam Zambrano states that patient and family were hopeful for a Monday discharge and now the next steps are overwhelming  LSW offered to schedule a family meeting with: patient, wife, children (phone), and doctors  Ghanshyam Zambrano feels that this would be helpful because the medical information is overwhelming  She does not feel like she has an understanding about what is going on and either do her children  Jim Rainey has always been a healthy person so this type of information is very new  Per Ghanshyam Zambrano, the children work during the day and would only be able to participate over the phone  Ghanshyam Zambrano is currently staying at her daughters (due to no electricity) and doesn't have her cell phone or car    LSW suggested Tuesday late morning/afternoon to discuss  Ghanshyam Zambrano will contact LSW to confirm

## 2018-03-05 NOTE — SOCIAL WORK
CM met with pt to discuss possible inpt rehab need upon d/c  CM provided pt with SNF list  Pt agreeable with reviewing list if inpt rehab is recommended upon d/c

## 2018-03-05 NOTE — ASSESSMENT & PLAN NOTE
Increased WCC inspite of 5 doses dacogen  Hemonc will decide next step  ANC 0  Will continue to monitor for fevers  On prophylactic antivirals  Abx per ID

## 2018-03-06 NOTE — SOCIAL WORK
CM informed pt/family in agreement with d/c to home with [facility]  Referral made to [facility] via [facility] for RN/PT/OT services

## 2018-03-06 NOTE — SOCIAL WORK
Pt/family undecided re: inpt rehab vs home with [facility] at this time  Pt to discuss further with family and will f/u with CM

## 2018-03-06 NOTE — PROGRESS NOTES
Progress Note - Aki Mole 7/24/1931, 80 y o  male MRN: [id number]    Unit/Bed#: [facility] 630-01 Encounter: [id number]    Primary Care Provider: Trever Day MD   Date and time admitted to hospital: 2/20/2018  8:34 PM        Febrile neutropenia ([address] )   Assessment & Plan    No further fevers  ANC 0  US neck - no abscess; large lymph node  throat swab - negative  CT head - negative  Lt back skin Bx site improving redness - suture removal done  Note cellulitis around suture site - improving:  Was on cefepime now on Keflex as per ID          Skin abnormality   Assessment & Plan    · Of the back, papular rash, dark red in color, palpable, not blanchable  · 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  · Status post biopsy with dermatologist, pending report  · As per Dermatology apply triamcinolone cream  · Improving with triamcinolone  · Patient has a small subcutaneous hematoma without signs infection on the lower left back area where the biopsy was performed, continue to apply warm compresses, at this time improving, continue with clinical monitoring, no need for incision or other surgical intervention at this time        CAP (community acquired pneumonia)   Assessment & Plan    Treated successfully with clinical improvement        MARIA VICTORIA (acute kidney injury) ([address] )   Assessment & Plan    Resolved  Recheck BMP in the future  Thrombocytopenia ([address] )   Assessment & Plan    Transfuse platelets for <80        Anemia   Assessment & Plan    2" AML - monitor Hb 8 4 today  Cbc in AM  No blood transfusion as per hematologist        [address]    Resolved  * AML (acute myeloblastic leukemia) ([facility] )   Assessment & Plan    Increased WCC inspite of 5 doses dacogen  Hemonc will decide next step:  Had a family meeting today    Hematology was okay with the possible discharge later this afternoon, however, patient's family refused as they were not yet ready at home for him  [address] 0; as per my discussion with Hematology, patient will continue to have a low ANC  Will continue to monitor for fevers  On prophylactic antivirals  Abx per ID; continue Keflex; was on cefepime              VTE Pharmacologic Prophylaxis:   Pharmacologic: Pharmacologic VTE Prophylaxis contraindicated due to Thrombocytopenia  Mechanical VTE Prophylaxis in Place: Yes    Patient Centered Rounds: I have performed bedside rounds with nursing staff today  Discussions with Specialists or Other Care Team Provider:  Case management  Hematologist     Education and Discussions with Family / Patient:  Patient  Patient's family at bedside  I discussed our findings and plans  Answered all questions and concerns  Time Spent for Care: Approximately 40 minutes  More than 50% of total time spent on counseling and coordination of care as described above  Current Length of Stay: 14 day(s)    Current Patient Status: Inpatient   Certification Statement: The patient will continue to require additional inpatient hospital stay due to Above findings and plans  Discharge Plan:  None yet today  Family meeting was done with palliative care as well as hematologist earlier today  Thus patient's family was apprised regarding decision by the hematologist that patient may be discharge today  Patient's family is not ready yet at home for him  Patient does not have any clothes to go home with  Thus, they refused the possible discharge today  Thus possible discharge tomorrow  Code Status: Level 3 - DNAR and DNI      Subjective:   Patient is doing fine  Patient denies any pains or any shortness of breath  Patient has very minimal cough (much better than before)  No other complaints  Objective:     Vitals:   Temp (24hrs), Av 9 °F (37 2 °C), Min:98 5 °F (36 9 °C), Max:99 1 °F (37 3 °C)    HR:  [86-93] 86  Resp:  [18] 18  BP: (126-138)/(77-78) 126/77  SpO2:  [92 %-94 %] 94 %  Body mass index is 22 08 kg/m²       Input and Output Summary (last 24 hours): Intake/Output Summary (Last 24 hours) at 03/06/18 1713  Last data filed at 03/06/18 1040   Gross per 24 hour   Intake             1400 ml   Output             1013 ml   Net              387 ml       Physical Exam:     Physical Exam   Constitutional: He is oriented to person, place, and time  No distress  HENT:   Head: Normocephalic and atraumatic  Eyes: Right eye exhibits no discharge  Left eye exhibits no discharge  No scleral icterus  Neck: No JVD present  No tracheal deviation present  Cardiovascular: Normal rate and regular rhythm  Exam reveals no gallop and no friction rub  No murmur heard  Pulmonary/Chest: Effort normal and breath sounds normal  No stridor  No respiratory distress  He has no wheezes  He has no rales  Abdominal: Soft  Bowel sounds are normal  He exhibits no distension  There is no tenderness  There is no rebound and no guarding  Musculoskeletal: He exhibits no edema, tenderness or deformity  Neurological: He is alert and oriented to person, place, and time  No cranial nerve deficit  Skin: Skin is warm  No rash noted  He is not diaphoretic  No erythema  No pallor  Psychiatric: He has a normal mood and affect  His behavior is normal  Thought content normal    Vitals reviewed  Additional Data:     Labs:      Results from last 7 days  Lab Units 03/06/18  0926   WBC Thousand/uL 42 03*   HEMOGLOBIN g/dL 8 4*   HEMATOCRIT % 24 2*   PLATELETS Thousands/uL 28*   LYMPHO PCT % 14   MONO PCT MAN % 69*   EOSINO PCT MANUAL % 0       Results from last 7 days  Lab Units 03/05/18  0544   SODIUM mmol/L 134*   POTASSIUM mmol/L 4 1   CHLORIDE mmol/L 103   CO2 mmol/L 22   BUN mg/dL 20   CREATININE mg/dL 1 07   CALCIUM mg/dL 8 3   TOTAL PROTEIN g/dL 7 6   BILIRUBIN TOTAL mg/dL 0 60   ALK PHOS U/L 100   ALT U/L 21   AST U/L 26   GLUCOSE RANDOM mg/dL 96           * I Have Reviewed All Lab Data Listed Above  * Additional Pertinent Lab Tests Reviewed:  Madelin Peng Admission Reviewed    Imaging:    Imaging Reports Reviewed Today Include:  Diagnostic imaging studies that were done on this admission  Imaging Personally Reviewed by Myself Includes:  None  Recent Cultures (last 7 days):       Results from last 7 days  Lab Units 03/01/18  1723   BLOOD CULTURE  No Growth After 4 Days  No Growth After 4 Days  Last 24 Hours Medication List:     Current Facility-Administered Medications:  acetaminophen 650 mg Oral Q6H PRN Netta Johnson MD   acyclovir 400 mg Oral Q12H Mele Werner MD PhD   allopurinol 100 mg Oral Daily Kurtis Green MD   [facility]ofen 5 [facility]   benzonatate 100 mg Oral TID PRN Netta Johnson MD   cephalexin 500 mg Oral Q12H [facility] Anabel Aldcherry,    docusate sodium 100 mg Oral BID Laxmi Tolbert DO   guaiFENesin 600 mg Oral Q12H [facility] HOME Netta Johnson MD   hydroxyurea 1,000 mg Oral Q12H Pepper Closs, MD PhD   hydrOXYzine HCL 10 mg Oral TID PRN Sang Casey Lajas   melatonin 3 mg Oral Daily With Recardo Petit Lajas   polyethylene glycol 17 g Oral Daily Laxmi Tolbert DO   triamcinolone  Topical BID Elgin Johansen  Today, Patient Was Seen By: Henrietta Saeed MD    ** Please Note: Dragon 360 Dictation voice to text software may have been used in the creation of this document   **

## 2018-03-06 NOTE — CASE MANAGEMENT
Continued Stay Review    Thank you,  [facility] in the [facility] by Praneeth Shah for 2017  Network Utilization Review Department  Phone: [phone]; Fax [phone]  ATTENTION: The Network Utilization Review Department is now centralized for our 7 Facilities  Make a note that we have a new phone and fax numbers for our Department  Please call with any questions or concerns to [phone] and carefully follow the prompts so that you are directed to the right person  All voicemails are confidential  Fax any determinations, approvals, denials, and requests for initial or continue stay review clinical to [phone]  Due to HIGH CALL volume, it would be easier if you could please send faxed requests to expedite your requests and in part, help us provide discharge notifications faster  Date: 3/6/2018    Vital Signs: /78 (BP Location: Left arm)   Pulse 93   Temp 98 5 °F (36 9 °C) (Oral)   Resp 18   Ht 5' 10" (1 778 m)   Wt 69 8 kg (153 lb 14 1 oz)   SpO2 92%   BMI 22 08 kg/m²     Medications:   Scheduled Meds:   Current Facility-Administered Medications:  acyclovir 400 mg Oral Q12H [facility]   allopurinol 100 mg Oral Daily   baclofen 5 mg Oral BID   cephalexin 500 mg Oral Q12H [facility]   docusate sodium 100 mg Oral BID   guaiFENesin 600 mg Oral Q12H [facility]   melatonin 3 mg Oral Daily With Dinner   polyethylene glycol 17 g Oral Daily   triamcinolone  Topical BID     Continuous Infusions:    PRN Meds:   acetaminophen    benzonatate    hydrOXYzine HCL     Nursing orders -     Abnormal Labs/Diagnostic Results:      Ref Range & Units 3/6/18 0926 3/5/18 0544 3/4/18 0846 3/3/18 1136 3/2/18 0603   WBC 4 31 - 10 16 Thousand/uL 42  03P   50  77CM   42  27CM   22 46   35  23CM     RBC 3 88 - 5 62 Million/uL 2 70P   2 62   2 36   2 54   2 56     Hemoglobin 12 0 - 17 0 g/dL 8 4P   7 9   7 3   7 8   8 0     Hematocrit 36 5 - 49 3 % 24 2P   23 5   21 1   22 8   23 0     MCV 82 - 98 fL 90P  90  89  90  90    MCH 26 8 - 34 3 pg 31 1P  30 2  30 9  30 7  31 3    MCHC 31 4 - 37 4 g/dL 34 7P  33 6  34 6  34 2  34 8    RDW 11 6 - 15 1 % 14 8P  14 5  14 9  14 9  15 3     MPV 8 9 - 12 7 fL 9 6P  9 4  9 0  8 5   10 2    Platelets 026 - 510 Thousands/uL 28P   39CM   45CM   52   19CM     nRBC /100 WBCs 0P  0  0  0  0               Ref Range & Units 3/5/18 0544 3/4/18 0639 3/2/18 0603 3/1/18 0643 2/28/18 0606   Sodium 136 - 145 mmol/L 134   134   135   135   136    Potassium 3 5 - 5 3 mmol/L 4 1  3 9  4 0  4 3  4 3    Chloride 100 - 108 mmol/L 103  104  104  104  105    CO2 21 - 32 mmol/L 22  23  23  23  24    Anion Gap 4 - 13 mmol/L 9  7  8  8  7    BUN 5 - 25 mg/dL 20  15  15  16  16    Creatinine 0 60 - 1 30 mg/dL 1 07  1 09CM  1  11CM  1 13CM  1 10CM    Glucose 65 - 140 mg/dL 96  98CM  117CM  97CM  96CM    Calcium 8 3 - 10 1 mg/dL 8 3  8 0   8 3  8 0   8 1     AST 5 - 45 U/L 26  23CM       ALT 12 - 78 U/L 21  23CM       Alkaline Phosphatase 46 - 116 U/L 100  94       Total Protein 6 4 - 8 2 g/dL 7 6  7 5       Albumin 3 5 - 5 0 g/dL 2 5   2 5        Total Bilirubin 0 20 - 1 00 mg/dL 0 60  0 68       eGFR ml/min/1 73sq m 63  61  60  59  60            US Head - neck - 3/3 - 1   No sonographic findings suspicious for abscess in the area of interest   2  Prominent lymph nodes along the right cervical chain        Age/Sex: 80 y o  male     Assessment/Plan:   Progress note - 3/5  Febrile neutropenia (HCC)   Assessment & Plan     No further fevers  ANC 0  US neck - no abscess; large lymph node  throat swab - negative  CT head - negative  Lt back skin Bx site improving redness - suture removal done  Note cellulitis around suture site - improving             * AML (acute myeloblastic leukemia) ([facility] [address] )   Assessment & Plan     Increased WCC inspite of 5 doses dacogen  Hemonc will decide next step  ANC 0  Will continue to monitor for fevers  On prophylactic antivirals  Abx per ID             Thrombocytopenia [facility])   Assessment & Plan     Transfuse platelets for <87          Anemia   Assessment & Plan     2" AML - monitor Hb 7 9  Cbc in AM        Oncology Note  - 3/2 - ASSESSMENT/PLAN:  Bettie Roth is a 80 y o  male with:    1) AML:  - received hydrea  Last day 5/5 Decagen on 3/3  - tollerating well  - continue allopurinol 100 mg  will check uric acid in Am  - continue acyclovir 400 mg po bid  - given the WBC is elevating, it suggesting the disease is aggressive, options will be another 5 days of Decagen, or hydrea and f/u as out-pt, or hospice       -  We will have a family meeting to re-discuss        2) pancytopenia   - anemia : 2nd to cancer  Transfuse on 3/4 1 u    - platelet  : receiving one unit today  - close monitor, keep Hb > 8 and plt > 20k  - plt transfusion 3/2       3) back lump : likely hematoma  - warm patch  - Hb is stable          4) ? Infection   - neck u/s  : no abscess  Palliative care - note 3/6   family for a meeting setup for  1 pm- 3/6  Plan:  1  Symptom management -               - Denies symptoms at present     2  Goals - Treatment focused              - Family meeting held at bedside today along with Dr Danisha Membreno from Med/Onc discussing options for future cares including proceeding with more disease directed chemotherapy vs comfort focused approach  - Pt and family taking time to consider options as presented today and are aware that his prognosis is poor but are leaning towards continuing with treatment                  Code Status: DNR/DNI - Level 3              Decisional apparatus:  Patient is competent on my exam today  If competence is lost, patient's substitute decision maker would default to wife by [place] 169  Advance Directive / Living Will / POLST:  none     Interval history:       I participated in a lengthy family meeting along with [facility] and Dr Danisha Membreno from Oncology at pt's bedside   His wife Todd Cerna and children Kevin and Treasure Augustine were present and his son Alberto Mack was teleconferenced in  Dr Parrish Anabel reviewed in detail results of pt's current blood work and response to treatment given so far and explained several options for future cares including proceeding with another round of low dose chemotherapy (it was explained that due to his age and co-morbidities he is not a candidate for high dose chemotherapy and the potential side effects of this type of treatment could cause more harm than good), continuing with hydrea on a daily basis vs shifting focus to more comfort focused approach and the difference between palliative care and hospice fully explained  Pt and family were informed that his AML is very aggressive and not curable  Many questions were answered  At this time, pt and family are leaning towards continuing with treatment as he feels he has been able to tolerate treatments so far without any distressing side effects  The only symptom he has had is fatigue  He understands that he will be required to have frequent monitoring of blood counts and may require frequent blood transfusions  Palliative Care will follow along throughout his treatments to provide support       Discharge Plan:  MANN

## 2018-03-06 NOTE — PROGRESS NOTES
Progress note - Palliative and Supportive Care   Walter Karimi 80 y o  male [id number]    Assessment:     Patient Active Problem List   Diagnosis    Allergic rhinitis    Atopic dermatitis    Balance problems    Enlarged prostate without lower urinary tract symptoms (luts)    Erectile dysfunction    Folliculitis    Hematuria, gross    Hyperglycemia    [facility]oma [facility] situ ([facility] )    Peripheral arterial disease (HCC)    Peripheral neuropathy    Peripheral vascular disease ([facility] )    Lethargic    Myalgia    Fever    Monocytosis    Generalized skin papules    SIRS (systemic inflammatory response syndrome) (HCC)    Lethargy    Anemia    Thrombocytopenia (HCC)    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([facility] )    MARIA VICTORIA (acute kidney injury) ([facility] )    CAP (community acquired pneumonia)    AML (acute myeloblastic leukemia) (HCC)    Skin abnormality    Febrile neutropenia (HCC)       Plan:  1  Symptom management -    - Denies symptoms at present    2  Goals - Treatment focused   - Family meeting held at bedside today along with Dr Jeferson Stevens from Med/Onc discussing options for future cares including proceeding with more disease directed chemotherapy vs comfort focused approach  - Pt and family taking time to consider options as presented today and are aware that his prognosis is poor but are leaning towards continuing with treatment      Code Status: DNR/DNI - Level 3   Decisional apparatus:  Patient is competent on my exam today  If competence is lost, patient's substitute decision maker would default to wife by [place] 169  Advance Directive / Living Will / POLST:  none    Interval history:       I participated in a lengthy family meeting along with Rojelio FERNANDEZ and Dr Jeferson Stevens from Oncology at pt's bedside  His wife Ton Vail and children Joy Zacarias and Benjy Thorpe were present and his son Jose M Boateng was teleconferenced in   Dr Jeferson Stevens reviewed in detail results of pt's current blood work and response to treatment given so far and explained several options for future cares including proceeding with another round of low dose chemotherapy (it was explained that due to his age and co-morbidities he is not a candidate for high dose chemotherapy and the potential side effects of this type of treatment could cause more harm than good), continuing with hydrea on a daily basis vs shifting focus to more comfort focused approach and the difference between palliative care and hospice fully explained  Pt and family were informed that his AML is very aggressive and not curable  Many questions were answered  At this time, pt and family are leaning towards continuing with treatment as he feels he has been able to tolerate treatments so far without any distressing side effects  The only symptom he has had is fatigue  He understands that he will be required to have frequent monitoring of blood counts and may require frequent blood transfusions  Palliative Care will follow along throughout his treatments to provide support  MEDICATIONS / ALLERGIES:    all current active meds have been reviewed    Allergies   Allergen Reactions    Iodinated Diagnostic Agents      IVP dye       OBJECTIVE:    Physical Exam  Physical Exam   Constitutional: He is oriented to person, place, and time  No distress  Alert, pleasant elderly man sitting comfortably in chair   HENT:   Head: Normocephalic and atraumatic  Cardiovascular: Normal rate and regular rhythm  Pulmonary/Chest: Effort normal  No respiratory distress  Neurological: He is alert and oriented to person, place, and time  Skin: Skin is warm and dry  He is not diaphoretic  There is pallor  Psychiatric: He has a normal mood and affect   His behavior is normal        Lab Results:   CBC:   Lab Results   Component Value Date    WBC 42 03 (HH) 03/06/2018    HGB 8 4 (L) 03/06/2018    HCT 24 2 (L) 03/06/2018    MCV 90 03/06/2018    PLT 28 (LL) 03/06/2018    MCH 31 1 03/06/2018    MCHC 34 7 03/06/2018    RDW 14 8 03/06/2018    MPV 9 6 03/06/2018    NRBC 0 03/06/2018   , CMP: No results found for: NA, K, CL, CO2, ANIONGAP, BUN, CREATININE, GLUCOSE, CALCIUM, AST, ALT, ALKPHOS, PROT, ALBUMIN, BILITOT, EGFR  Imaging Studies: EKG, Pathology, and Other Studies: all pertinent studies reviewed    Counseling / Coordination of Care  Total floor / unit time spent today 75 minutes  Greater than 50% of total time was spent with the patient and / or family counseling and / or coordination of care  A description of the counseling / coordination of care: discussion with Dr Johnnie Howard, participation in family meeting as described above

## 2018-03-06 NOTE — PLAN OF CARE
Problem: Nutrition/Hydration-ADULT  Goal: Nutrient/Hydration intake appropriate for improving, restoring or maintaining nutritional needs  Monitor and assess patient's nutrition/hydration status for malnutrition (ex- brittle hair, bruises, dry skin, pale skin and conjunctiva, muscle wasting, smooth red tongue, and disorientation)  Collaborate with interdisciplinary team and initiate plan and interventions as ordered  Monitor patient's weight and dietary intake as ordered or per policy  Utilize nutrition screening tool and intervene per policy  Determine patient's food preferences and provide high-protein, high-caloric foods as appropriate  INTERVENTIONS:  - Monitor oral intake, urinary output, labs, and treatment plans  - Assess nutrition and hydration status and recommend course of action  - Evaluate amount of meals eaten  - Assist patient with eating if necessary   - Allow adequate time for meals  - Recommend/ encourage appropriate diets, oral nutritional supplements, and vitamin/mineral supplements  - Order, calculate, and assess calorie counts as needed  - Assess need for intravenous fluids  - Provide specific nutrition/hydration education as appropriate  - Include patient/family/caregiver in decisions related to nutrition    Outcome: Progressing  Fair/good po intake; wt records reveal 10 lb decrease from 2/27 to 3/3; ?  If accurate; suggest re-check wt

## 2018-03-06 NOTE — SOCIAL WORK
Lengthy family meeting attended by Dr Aleena Frances, myself, daughter Pepper Rawls, son Treasure Augustine, son Ascencion Meeks (via phone), and wife Todd Cerna  Family and patient are in agreement that they would like to continue hydroxyurea  They would also like to discuss continuing chemotherapy outpatient  Patient and family declined rehab facility and would rather do in-home VNA through [facility] (nursing and physical therapy)      Dr Danisha Membreno was contacted by [facility] to inform of the family's decision  CM was contacted by W to inform of family's decison

## 2018-03-06 NOTE — RESTORATIVE TECHNICIAN NOTE
Restorative Specialist Mobility Note       Activity: Other (Comment) (Pt having a meeting with palliative care per nursing )       Jose PENA, Restorative Technician, [employer]

## 2018-03-06 NOTE — ASSESSMENT & PLAN NOTE
No further fevers  ANC 0  US neck - no abscess; large lymph node  throat swab - negative  CT head - negative  Lt back skin Bx site improving redness - suture removal done  Note cellulitis around suture site - improving:  Was on cefepime now on Keflex as per ID

## 2018-03-06 NOTE — PROGRESS NOTES
Patient: Jaden James  Patient MRN: [id number]  Service date: 2/25/2018  Attending Physician:       CHIEF COMPLAIN  No chief complaint on file  Heme / Oncology history:  Jaden James is a 80 y o  male  With newly diagnosed AML  Received hydroxyurea a 1000 milligrams twice a day from 2/23-2/25  On  R [address] now, currently day 5/5  Pt reported doing well  No fever, chills  No bleeding  HISTORY OF PRESENT ILLNESS:  Jaden James is a 80 y o  male who presents      PAST MEDICAL HISTORY:   has no past medical history on file  PAST SURGICAL HISTORY:   has a past surgical history that includes Hernia repair (12/09/2003); Colonoscopy (N/A, 2012); Tonsillectomy (N/A); pr phleb veins - extrem - to 20 (Left, 1/15/2016); pr endovenous laser, 1st vein (Left, 1/15/2016); Colonoscopy (01/2013); and Cystoscopy  CURRENT MEDICATIONS  Scheduled Meds:    Current Facility-Administered Medications:  acetaminophen 650 mg Oral Q6H PRN Leslie Nuñez MD    acyclovir 400 mg Oral Q12H Barbara Maza MD PhD    allopurinol 100 mg Oral Daily Wilmar Granado MD    benzonatate 100 mg Oral TID PRN Leslie Nuñez MD    cefTRIAXone 1,000 mg Intravenous Q24H Leslie Nuñez MD Last Rate: Stopped (02/24/18 1610)   guaiFENesin 600 mg Oral Q12H [address] Leslie Nuñez MD    hydroxyurea 1,000 mg Oral Q12H [address], MD    hydrOXYzine HCL 25 mg Oral Q6H PRN Leslie Nuñez MD    melatonin 3 mg Oral HS Wilmar Granado MD    sodium bicarbonate infusion 50 mL/hr Intravenous Continuous Wilmar Granado MD Last Rate: Stopped (02/24/18 1225)   sodium chloride 50 mL/hr Intravenous Continuous Wilmar Granado MD Last Rate: Stopped (02/24/18 1225)   triamcinolone  Topical BID Enrigue Malini        Continuous Infusions:    sodium bicarbonate infusion 50 mL/hr Last Rate: Stopped (02/24/18 1225)   sodium chloride 50 mL/hr Last Rate: Stopped (02/24/18 1225)     PRN Meds:   acetaminophen    benzonatate    hydrOXYzine HCL    SOCIAL HISTORY: reports that he has never smoked  He has never used smokeless tobacco  He reports that he drinks alcohol  His drug history is not on file  FAMILY HISTORY:  family history includes Coronary artery disease in his father and mother; Heart disease in his father; Stroke in his mother  ALLERGIES:  is allergic to iodinated diagnostic agents  REVIEW OF SYSTEMS:  Please note that a 14-point review of systems was performed to include Constitutional, HEENT, Respiratory, CVS, GI, , Musculoskeletal, Integumentary, Neurologic, Rheumatologic, Endocrinologic, Psychiatric, Lymphatic, and Hematologic/Oncologic systems were reviewed and are negative unless otherwise stated in HPI  Positive and negative findings pertinent to this evaluation are incorporated into the history of present illness  PHYSICAL EXAMINATION:  Vital Signs: Temp:  [97 5 °F (36 4 °C)-99 °F (37 2 °C)] 98 6 °F (37 °C)  HR:  [76-98] 76  Resp:  [18-21] 21  BP: (104-128)/(58-71) 104/64  Body mass index is 23 19 kg/m²  Body surface area is 1 91 meters squared  Constitutional: Alert and oriented  HEENT: Anicteric, PERRLA  Chest: Decreased breathing sound bilaterally, No wheezes/rales/rhonchi  CVS: Regular rhythm  Normal rate  Abdomen: Soft, nontender, nondistended  Bowel sounds positive X 4  No palpable organomegaly  Back: diffuse erythematous rash  Left flank mass 6 cm in diameter  Extremities: No cyanosis/clubbing/edema  Integumentary: No obvious rashes or bruises  Musculoskeletal: No obvious bony or joint deformities  Psychiatric: Appropriate affect and mood  Lymph Node Survey: No palpable preauricular, submandibular, cervical, supraclavicular, axillary, epitrochlear or inguinal lymphadenopathy      LABS:    Results from last 7 days  Lab Units 02/25/18  0604 02/24/18  2155 02/24/18  0521 02/23/18  1706   WBC Thousand/uL 56 11* 90 45* 106 65* 123 30*   HEMATOCRIT % 22 8* 23 4* 18 7* 20 5*   PLATELETS Thousands/uL 25* 27* 31* 31*   MONO PCT MAN % 62*  --  58* 69*       Results from last 7 days  Lab Units 02/25/18  0604 02/24/18  0521 02/23/18  0604   SODIUM mmol/L 135* 139 134*   POTASSIUM mmol/L 4 1 4 0 3 9   CHLORIDE mmol/L 106 107 106   CO2 mmol/L 21 22 21   BUN mg/dL 18 12 12       Results from last 7 days  Lab Units 02/25/18  0604 02/24/18  0521 02/23/18  0604  02/20/18  0606 02/19/18  1642   PHOSPHORUS mg/dL 3 8 3 2 1 6*  < > 2 7  --    MAGNESIUM mg/dL 2 2 2 1 2 0  < > 2 0  --    ALBUMIN g/dL  --   --   --   --  2 5* 3 2*   BILIRUBIN TOTAL mg/dL  --   --   --   --  0 66 0 75   ALK PHOS U/L  --   --   --   --  66 79   ALT U/L  --   --   --   --  24 25   AST U/L  --   --   --   --  37 40   < > = values in this interval not displayed      Results from last 7 days  Lab Units 02/20/18  0607 02/19/18  2310   INR  1 39* 1 37*   PTT seconds 41* 46*           Invalid input(s): TNI,  PCT      Results from last 7 days  Lab Units 02/20/18  0606   LD U/L 486*             IMAGING  IR bone marrow biopsy/aspiration   Final Result   Impression:      Technically successful image guided bone marrow aspiration and core biopsy         Workstation performed: [id number]         :      PROBLEM LIST:    Patient Active Problem List   Diagnosis    Allergic rhinitis    Atopic dermatitis    Balance problems    Enlarged prostate without lower urinary tract symptoms (luts)    Erectile dysfunction    Folliculitis    Hematuria, gross    Hyperglycemia    [facility] [address] [facility]    Peripheral arterial disease (HCC)    Peripheral neuropathy    Peripheral vascular disease (HCC)    Lethargic    Myalgia    Fever    Monocytosis    Generalized skin papules    SIRS (systemic inflammatory response syndrome) (HCC)    Lethargy    Anemia    Thrombocytopenia (HCC)    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([address] )    MARIA VICTORIA (acute kidney injury) ([facility] [address] [facility]    CAP (community acquired pneumonia)    AML (acute myeloblastic leukemia) ([facility] [address] )    Skin abnormality       ASSESSMENT/PLAN:  Kobe Miranda is a 80 y o  male with:    1) AML:  - received hydrea before chemo  - Last day 5/5 Decagen on 3/3;  tollerating well  - continue allopurinol 100 mg   , uric acid 4 6  - continue acyclovir 400 mg po bid    - given the WBC is elevating, it suggesting the disease is aggressive, discussed with patient and family regarding different options : another 5 days of Decagen, or hydrea and f/u as out-pt, or hospice  The option to start Hydrea and follow as outpatient  2) pancytopenia   - anemia : 2nd to cancer  Transfuse on 3/4 1 u    - platelet  : 1u on 3/2, 1u on 3/5  - close monitor, keep Hb > 8 and plt > 20k        3) back lump : likely hematoma  - warm patch  - Hb is stable  4) ? Infection   - neck u/s  : no abscess  - Overall, patient is ready to be discharged from hematology standpoint, patient will continue to follow as - outpatient with Dr Noe Stewart for further treatment  - Patient will need a lab on Thursday, then twice a week for possible blood transfusions  -neutropenia, ANC is 0, however likely is not an accurate number in the setting of AML   -patient will continue to take oral antibiotics for prophylaxis for infection, acyclovir for prophylactic of viral infection    -he will continue allopurinol as outpatient  -he will continue Hydrea as outpatient    Abilio Saenz MD PhD  Hematology / Oncology

## 2018-03-06 NOTE — ASSESSMENT & PLAN NOTE
Increased WCC inspite of 5 doses dacogen  Hemonc will decide next step:  Had a family meeting today  Hematology was okay with the possible discharge later this afternoon, however, patient's family refused as they were not yet ready at home for him  [address]; as per my discussion with Hematology, patient will continue to have a low ANC    Will continue to monitor for fevers  On prophylactic antivirals  Abx per ID; continue Keflex; was on cefepime

## 2018-03-07 NOTE — ASSESSMENT & PLAN NOTE
As per my discussion with the oncologist/hematologist, today, to give patient options:  Option 1:  If patient wants to go home today we will need to transfused a unit of platelets prior to discharge  Option 2 :  If patient refuses to go home today for some valid reasons, no platelet transfusion or any transfusions today and will just repeat the CBC tomorrow  From my discussion with the patient, his firm with his decision not to be discharged today; this is primarily due to the bad weather today and he thinks not a safe discharge today and his family is still preparing their home for him  Recheck CBC tomorrow

## 2018-03-07 NOTE — PROGRESS NOTES
Progress Note - Dasha Mederos 7/24/1931, 80 y o  male MRN: [id number]    Unit/Bed#: [facility] 630-01 Encounter: [id number]    Primary Care Provider: Bhavna Dalton MD   Date and time admitted to hospital: 2/20/2018  8:34 PM        Febrile neutropenia ([address] )   Assessment & Plan    No further fevers  ANC 0  US neck - no abscess; large lymph node  throat swab - negative  CT head - negative  Lt back skin Bx site redness has subsided - suture removal done  Note cellulitis around suture site - improved:  Was on cefepime now on Keflex as per ID          Skin abnormality   Assessment & Plan    · Of the back, papular rash, previously dark red in color, palpable, not blanchable; presently this is improving  · Leukemia cutis on biopsy results  · Status post biopsy with dermatologist  · As per Dermatology apply triamcinolone cream  · Improving with triamcinolone  · Patient has a small subcutaneous hematoma without signs infection on the lower left back area where the biopsy was performed, continue to apply warm compresses, at this time continues to improved, continue with clinical monitoring, no need for incision or other surgical intervention at this time        CAP (community acquired pneumonia)   Assessment & Plan    Treated successfully with clinical improvement        MARIA VICTORIA (acute kidney injury) ([address] )   Assessment & Plan    Resolved  Recheck BMP in the future  Thrombocytopenia ([address] )   Assessment & Plan    As per my discussion with the oncologist/hematologist, today, to give patient options:  Option 1:  If patient wants to go home today we will need to transfused a unit of platelets prior to discharge  Option 2 :  If patient refuses to go home today for some valid reasons, no platelet transfusion or any transfusions today and will just repeat the CBC tomorrow    From my discussion with the patient, his firm with his decision not to be discharged today; this is primarily due to the bad weather today and he thinks not a safe discharge today and his family is still preparing their home for him  Recheck CBC tomorrow  Anemia   Assessment & Plan    2" AML - monitor Hb 8 1today  Cbc in AM  No PACKED RBCS blood transfusion TODAY as per hematologist        Lethargic   Assessment & Plan    Resolved  * AML (acute myeloblastic leukemia) ([facility] )   Assessment & Plan    Increased WCC inspite of 5 doses dacogen  Had a family meeting yesterday and according to oncologist, they will continue managing patient's AML; possibility of comfort measures and hospice care were also discussed in the meeting, but at this time, according to Oncology, patient and patient's family wanted to have and continue active treatment  ANC 0; as per my discussion with Hematology, patient will continue to have a low or 0 ANC  Will continue to monitor for fevers  On prophylactic antivirals  Abx per ID; continue Keflex; was on cefepime              VTE Pharmacologic Prophylaxis:   Pharmacologic: Pharmacologic VTE Prophylaxis contraindicated due to Thrombocytopenia  Mechanical VTE Prophylaxis in Place: Yes    Patient Centered Rounds: I have performed bedside rounds with nursing staff today  Discussions with Specialists or Other Care Team Provider:  Case management  Hematologist, Dr Jose Juan Ridley     Education and Discussions with Family / Patient:  Patient  I called patient's spouse, Doretha Madrigal, and left a voicemail message for call back    Time Spent for Care: 30 minutes  More than 50% of total time spent on counseling and coordination of care as described above  Current Length of Stay: 15 day(s)    Current Patient Status: Inpatient   Certification Statement: The patient will continue to require additional inpatient hospital stay due to Above findings and plans  Discharge Plan:  None yet today  Possible discharge tomorrow  Code Status: Level 3 - DNAR and DNI      Subjective:   Presently, patient is doing fine and better    Patient denies any pains or any shortness of breath  Patient told me that his skin lesion at this back has improved significantly  No complaints today  Objective:     Vitals:   Temp (24hrs), Av 9 °F (37 2 °C), Min:98 7 °F (37 1 °C), Max:99 °F (37 2 °C)    HR:  [86-91] 91  Resp:  [18-22] 22  BP: (108-126)/(55-77) 110/61  SpO2:  [93 %-94 %] 93 %  Body mass index is 22 08 kg/m²  Input and Output Summary (last 24 hours): Intake/Output Summary (Last 24 hours) at 18 0846  Last data filed at 18 0129   Gross per 24 hour   Intake             1300 ml   Output              550 ml   Net              750 ml       Physical Exam:     Physical Exam   Constitutional: He is oriented to person, place, and time  No distress  HENT:   Head: Normocephalic and atraumatic  Eyes: Right eye exhibits no discharge  Left eye exhibits no discharge  No scleral icterus  Neck: No JVD present  No tracheal deviation present  Cardiovascular: Normal rate, regular rhythm and normal heart sounds  Exam reveals no gallop and no friction rub  No murmur heard  Pulmonary/Chest: Effort normal and breath sounds normal  No stridor  No respiratory distress  He has no wheezes  He has no rales  Abdominal: Soft  Bowel sounds are normal  He exhibits no distension  There is no tenderness  There is no rebound and no guarding  Musculoskeletal: He exhibits no edema, tenderness or deformity  Neurological: He is alert and oriented to person, place, and time  No cranial nerve deficit  Skin: Skin is warm  No rash noted  He is not diaphoretic  No erythema  No pallor  No more redness/erythema on patient's back skin lesions  Nontender  Psychiatric: He has a normal mood and affect  His behavior is normal  Thought content normal    Vitals reviewed        Additional Data:     Labs:      Results from last 7 days  Lab Units 18  0530 18  0926   WBC Thousand/uL 44 40* 42 03*   HEMOGLOBIN g/dL 8 1* 8 4*   HEMATOCRIT % 23 7* 24 2*   PLATELETS Thousands/uL 21* 28*   LYMPHO PCT %  --  14   MONO PCT MAN %  --  69*   EOSINO PCT MANUAL %  --  0       Results from last 7 days  Lab Units 03/05/18  0544   SODIUM mmol/L 134*   POTASSIUM mmol/L 4 1   CHLORIDE mmol/L 103   CO2 mmol/L 22   BUN mg/dL 20   CREATININE mg/dL 1 07   CALCIUM mg/dL 8 3   TOTAL PROTEIN g/dL 7 6   BILIRUBIN TOTAL mg/dL 0 60   ALK PHOS U/L 100   ALT U/L 21   AST U/L 26   GLUCOSE RANDOM mg/dL 96           * I Have Reviewed All Lab Data Listed Above  * Additional Pertinent Lab Tests Reviewed: [address] Admission Reviewed    Imaging:    Imaging Reports Reviewed Today Include:  Diagnostic imaging studies that were done on this admission  Imaging Personally Reviewed by Myself Includes:  None  Recent Cultures (last 7 days):       Results from last 7 days  Lab Units 03/01/18  1723   BLOOD CULTURE  No Growth After 5 Days  No Growth After 5 Days  Last 24 Hours Medication List:     Current Facility-Administered Medications:  acetaminophen 650 mg Oral Q6H PRN Jenifer Anderson MD   acyclovir 400 mg Oral Q12H Jose Solorio MD PhD   allopurinol 100 mg Oral Daily Clara Kerr MD   baclofen 5 mg Oral BID Agustina Almonte [place]   benzonatate 100 mg Oral TID PRN Jenifer Anderson MD   cephalexin 500 mg Oral Q12H [address] Anabel Aldea, DO   docusate sodium 100 mg Oral BID Hetul Tolbert, DO   guaiFENesin 600 mg Oral Q12H [address] Jenifer Anderson MD   hydroxyurea 1,000 mg Oral Q12H Lali Kraus MD PhD   hydrOXYzine HCL 10 mg Oral TID PRN Agustina Almonte [place]   melatonin 3 mg Oral Daily With [employer] [place]   polyethylene glycol 17 g Oral Daily Hetul Tolbert, DO   triamcinolone  Topical BID Dennis Maher  Today, Patient Was Seen By: Liseth Rodríguez MD    ** Please Note: Dragon 360 Dictation voice to text software may have been used in the creation of this document   **

## 2018-03-07 NOTE — PROGRESS NOTES
Progress note - Palliative and Supportive Care   Reji Trinh 80 y o  male [id number]    Assessment:  Newly diagnosed AML  Pancytopenia  Peripheral artery disease  MARIA VICTORIA  Maybe acquired pneumonia  History of squamous cell cancer  History of melanoma    Plan:  1  Symptom management:      Fatigue:  Patient feels fatigued but knows it is all as side effect of the chemo he has been receiving; he will continue to be transfused as needed    2  Goals:  Treatment focus  Continue all cares outpatient  Will have him follow up with us outpatient  Discharge planning? Code Status:  DNR DNI - Level 3   Decisional apparatus:  Patient is competent on my exam today  If competence is lost, patient's substitute decision maker would default to wife [place]  Advance Directive / Living Will / POLST:  ? Interval history:  Lengthy family meeting yesterday to discuss patient's poor reaction to first chemo treatment  Family and patient want to continue Hydroxyurea  Family has declined rehab facility and would prefer to do in-home VNA  He does not think they will be able to pick him up today because of the inclement weather and instead have planned for discharge tomorrow?     MEDICATIONS / ALLERGIES:    all current active meds have been reviewed and current meds:   Current Facility-Administered Medications   Medication Dose Route Frequency    acetaminophen (TYLENOL) tablet 650 mg  650 mg Oral Q6H PRN    acyclovir (ZOVIRAX) capsule 400 mg  400 mg Oral Q12H [facility]    allopurinol (ZYLOPRIM) tablet 100 mg  100 mg Oral Daily    baclofen tablet 5 mg  5 mg Oral BID    benzonatate (TESSALON PERLES) capsule 100 mg  100 mg Oral TID PRN    cephalexin (KEFLEX) capsule 500 mg  500 mg Oral Q12H [facility]    docusate sodium (COLACE) capsule 100 mg  100 mg Oral BID    guaiFENesin (MUCINEX) 12 hr tablet 600 mg  600 mg Oral Q12H JAMES    hydroxyurea (HYDREA) capsule 1,000 mg  1,000 mg Oral Q12H    hydrOXYzine HCL (ATARAX) tablet 10 mg  10 mg Oral TID PRN    melatonin tablet 3 mg  3 mg Oral Daily With Dinner    polyethylene glycol (MIRALAX) packet 17 g  17 g Oral Daily    triamcinolone (KENALOG) 0 1 % cream   Topical BID       Allergies   Allergen Reactions    Iodinated Diagnostic Agents      IVP dye       OBJECTIVE:    Physical Exam  Physical Exam   Constitutional: He is oriented to person, place, and time  He appears well-developed and well-nourished  He is sleeping  He is easily aroused  Non-toxic appearance  He does not have a sickly appearance  No distress  He is not intubated  HENT:   Head: Normocephalic and atraumatic  Not macrocephalic and not microcephalic  Head is without raccoon's eyes and without Strauss's sign  +dry mucous membranes   Eyes: Lids are normal  Right conjunctiva is not injected  Right conjunctiva has no hemorrhage  Left conjunctiva is not injected  Left conjunctiva has no hemorrhage  No scleral icterus  Neck: Normal range of motion  Pulmonary/Chest: Effort normal  No accessory muscle usage  No apnea, no tachypnea and no bradypnea  He is not intubated  No respiratory distress  Abdominal: He exhibits no distension  Neurological: He is alert, oriented to person, place, and time and easily aroused  No cranial nerve deficit  GCS eye subscore is 4  GCS verbal subscore is 5  Skin: Skin is warm and dry  He is not diaphoretic  There is pallor  Psychiatric: His speech is normal and behavior is normal  Judgment and thought content normal  Cognition and memory are normal  He exhibits a depressed mood  Lab Results:   I have personally reviewed pertinent labs  , CBC:   Lab Results   Component Value Date    WBC 44 40 (HH) 03/07/2018    HGB 8 1 (L) 03/07/2018    HCT 23 7 (L) 03/07/2018    MCV 91 03/07/2018    PLT 21 (LL) 03/07/2018    MCH 30 9 03/07/2018    MCHC 34 2 03/07/2018    RDW 14 9 03/07/2018    MPV 9 1 03/07/2018    NRBC 0 03/07/2018   , CMP: No results found for: NA, K, CL, CO2, ANIONGAP, BUN, CREATININE, GLUCOSE, CALCIUM, AST, ALT, ALKPHOS, PROT, ALBUMIN, BILITOT, EGFR  Imaging Studies:  None new  EKG, Pathology, and Other Studies: none new

## 2018-03-07 NOTE — ASSESSMENT & PLAN NOTE
Increased WCC inspite of 5 doses dacogen  Had a family meeting yesterday and according to oncologist, they will continue managing patient's AML; possibility of comfort measures and hospice care were also discussed in the meeting, but at this time, according to Oncology, patient and patient's family wanted to have and continue active treatment  ANC 0; as per my discussion with Hematology, patient will continue to have a low or 0 ANC    Will continue to monitor for fevers  On prophylactic antivirals  Abx per ID; continue Keflex; was on cefepime

## 2018-03-07 NOTE — ASSESSMENT & PLAN NOTE
· Of the back, papular rash, previously dark red in color, palpable, not blanchable; presently this is improving    · Leukemia cutis on biopsy results  · Status post biopsy with dermatologist  · As per Dermatology apply triamcinolone cream  · Improving with triamcinolone  · Patient has a small subcutaneous hematoma without signs infection on the lower left back area where the biopsy was performed, continue to apply warm compresses, at this time continues to improved, continue with clinical monitoring, no need for incision or other surgical intervention at this time

## 2018-03-07 NOTE — PLAN OF CARE
Problem: OCCUPATIONAL THERAPY ADULT  Goal: Performs self-care activities at highest level of function for planned discharge setting  See evaluation for individualized goals  Treatment Interventions: ADL retraining, Functional transfer training, UE strengthening/ROM, Endurance training, Patient/family training, Equipment evaluation/education, Compensatory technique education, Energy conservation, Activityengagement          See flowsheet documentation for full assessment, interventions and recommendations  Outcome: Progressing  Limitation: Decreased ADL status, Decreased UE strength, Decreased Safe judgement during ADL, Decreased endurance, Decreased self-care trans, Decreased high-level ADLs  Prognosis: Fair  Assessment: Pt participated in skilled OT session this date focusing on self-care routine  Pt agreeable to participate in OT tx session  Upon arrival, pt seated in chair  Following tx session, pt seated in chair all needs within reach  Pt requiring SBA for sit<>stand transfers and to ambulate into bathroom for ADLs  Pt completed grooming including washing/drying hands and face and shaving standing at sink w/ SBA  Pt completed UB bathing and dressing standing at sink w/ SBA, Min A for management of gown  Pt finished LB self-care including bathing/managing socks seated in chair w/ SBA  Pt requiring Min A for toilet transfer  In comparison to previous sessions, pt w/ improvements in mobility, ADL independence  Pt continues to be functioning below baseline level 2* decreased mobility, decreased activity tolerance, decreased dynamic balance  From OT standpoint, anticipate d/c home w/ family support and home OT to address safety and independence w/ ADLs, IADLs, and functional mobility in home environment  Continue to follow pt in hospital for OT treatments to address performance deficits and to maximize safety and independence w/ ADLs and functional mobility       OT Discharge Recommendation: Home OT  OT - OK to Discharge:  Yes

## 2018-03-07 NOTE — RESTORATIVE TECHNICIAN NOTE
Restorative Specialist Mobility Note       Activity: Ambulate in marin, Ambulate in room, Bathroom privileges, Chair, Stand at bedside (Educated/encouraged pt to ambulate with assistance 3-4 x's/day  Chair alarm on   Pt callbell, phone/tray within reach )     Assistive Device: Front wheel walker (Face mask on for neutropenic precautions)       Stevie PENA, Restorative Technician, [employer]

## 2018-03-07 NOTE — OCCUPATIONAL THERAPY NOTE
Occupational Therapy Treatment Note      [facility]    3/7/2018    Patient Active Problem List   Diagnosis    Allergic rhinitis    Atopic dermatitis    Balance problems    Enlarged prostate without lower urinary tract symptoms (luts)    Erectile dysfunction    Folliculitis    Hematuria, gross    Hyperglycemia    Melanoma in situ ([facility] )    Peripheral arterial disease [facility]    Peripheral neuropathy    Peripheral vascular disease ([facility] )    Lethargic    Myalgia    Fever    Monocytosis    Generalized skin papules    SIRS (systemic inflammatory response syndrome) (HCC)    Lethargy    Anemia    Thrombocytopenia (HCC)    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([facility] )    MARIA VICTORIA (acute kidney injury) ([facility] )    CAP (community acquired pneumonia)    AML (acute myeloblastic leukemia) ([facility]    Skin abnormality    Febrile neutropenia (HCC)       History reviewed  No pertinent past medical history  Past Surgical History:   Procedure Laterality Date    COLONOSCOPY N/A 2012    COLONOSCOPY  01/2013    No repeat is recommended  Findings were diverticulosis and mild hemorrhoids   CYSTOSCOPY      onset: 05/06/2016; diagnostic    HERNIA REPAIR  12/09/2003    MI ENDOVENOUS LASER, 1ST VEIN Left 1/15/2016    Procedure: ENDOVASCULAR LASER THERAPY (EVLT) with multiple stab phlebectomies-between 10-20; Surgeon: Lorene Melo MD;  [facility]: [facility];  Service: Vascular    MI PHLEB VEINS - EXTREM - TO 20 Left 1/15/2016    Procedure: LEG ENDOVENOUS LASER OBLITERATION GREATER SAPHENOUS VEIN WITH MULTIPLE VEIN LIGATIONS ;  Surgeon: Lorene Melo MD;  [facility];  Service: Vascular    TONSILLECTOMY N/A         03/07/18 1620   Restrictions/Precautions   Weight Bearing Precautions Per Order No   Other Precautions Chair Alarm; Fall Risk  (neutropenic precautions)   Pain Assessment   Pain Assessment No/denies pain   Pain Score No Pain   ADL   Where Assessed Standing at sink   Grooming Assistance 5  Supervision/Setup   Grooming Deficit Setup;Verbal cueing;Supervision/safety;Standing with assistive device; Shaving; Wash/dry hands; Wash/dry face   UB Bathing Assistance 5  Supervision/Setup   UB Bathing Deficit Setup;Supervision/safety; Increased time to complete   LB Bathing Assistance 5  Supervision/Setup   LB Bathing Deficit Increased time to complete;Supervision/safety;Verbal cueing;Setup   UB Dressing Assistance 5  Supervision/Setup   UB Dressing Deficit Setup; Thread RUE; Thread LUE   LB Dressing Assistance 5  Supervision/Setup   LB Dressing Deficit Setup;Don/doff R sock; Don/doff L sock   LB Dressing Comments seated in chair   Functional Standing Tolerance   Time 25 minutes   Activity grooming/self-care at sink, w/ rest breaks   Transfers   Sit to Stand 5  Supervision   Additional items Increased time required   Stand to Sit 5  Supervision   Additional items Increased time required   Toilet transfer 4  Minimal assistance   Additional items Assist x 1;Standard toilet   Functional Mobility   Functional Mobility 5  Supervision   Additional Comments VC for RW management   Additional items Rolling walker   Cognition   Overall Cognitive Status WFL   Arousal/Participation Cooperative   Attention Within functional limits   Orientation Level Oriented X4   Memory Within functional limits   Following Commands Follows one step commands without difficulty   Activity Tolerance   Activity Tolerance Patient tolerated treatment well   Medical Staff Made Aware ok to see per rn   Assessment   Assessment Pt participated in skilled OT session this date focusing on self-care routine  Pt agreeable to participate in OT tx session  Upon arrival, pt seated in chair  Following tx session, pt seated in chair all needs within reach  Pt requiring SBA for sit<>stand transfers and to ambulate into bathroom for ADLs  Pt completed grooming including washing/drying hands and face and shaving standing at sink w/ SBA   Pt completed UB bathing and dressing standing at sink w/ SBA, Min A for management of gown  Pt finished LB self-care including bathing/managing socks seated in chair w/ SBA  Pt requiring Min A for toilet transfer  In comparison to previous sessions, pt w/ improvements in mobility, ADL independence  Pt continues to be functioning below baseline level 2* decreased mobility, decreased activity tolerance, decreased dynamic balance  From OT standpoint, anticipate d/c home w/ family support and home OT to address safety and independence w/ ADLs, IADLs, and functional mobility in home environment  Continue to follow pt in hospital for OT treatments to address performance deficits and to maximize safety and independence w/ ADLs and functional mobility  Plan   Treatment Interventions ADL retraining;Functional transfer training;UE strengthening/ROM; Endurance training;Patient/family training;Equipment evaluation/education; Compensatory technique education; Energy conservation; Activityengagement   Goal Expiration Date 03/19/18   Treatment Day 1   OT Frequency 3-5x/wk   Recommendation   OT Discharge Recommendation Home OT   OT - OK to Discharge Yes   Barthel Index   Feeding 10   Bathing 0   Grooming Score 5   Dressing Score 5   Bladder Score 10   Bowels Score 10   Toilet Use Score 5   Transfers (Bed/Chair) Score 10   Mobility (Level Surface) Score 0   Stairs Score 0   Barthel Index Score 55   Documentation Completed by Abagail Bamberger, MS, OTR/L

## 2018-03-07 NOTE — ASSESSMENT & PLAN NOTE
No further fevers  ANC 0  US neck - no abscess; large lymph node  throat swab - negative  CT head - negative  Lt back skin Bx site redness has subsided - suture removal done  Note cellulitis around suture site - improved:  Was on cefepime now on Keflex as per ID

## 2018-03-08 NOTE — SOCIAL WORK
LSW met with patient to discuss the current option of rehab  When asked his opinion, patient rolled his eyes  Patient was very weak and struggled to hold the phone to talk to his wife  LSW spoke to wife via home  Wife is very concerned about patient's current status (specifically, his weakness)  Wife states that if patient wants to come home they would find enough support to assist in the home  Wife states that they will discuss further as a family but would also like to continue the referrals to the rehab options - specifically [address]

## 2018-03-08 NOTE — ASSESSMENT & PLAN NOTE
2" AML   Cbc in AM  With patient's hemoglobin more decreased today, a unit of packed RBCs will be transfused as per recommendation by our hematologist

## 2018-03-08 NOTE — PROGRESS NOTES
Rounded with Dr Helena Gonsalves  Pts blood counts are down today and Dr Helena Gonsalves spoke with Dr Caryle Service regarding his counts  Dr Caryle Service would like the patient to be transfused today prior to his discharge  The patient will receive 1 unit PRBCs and 1 unit platelets  Dr Helena Gonsalves asked the patient about the change in his discharge plan, the patient would now like to go to rehab, rather than home  Dr Helena Gonsalves will speak to case management about this change  He did tell the patient that his discharge may not occur today since a facility will need to be arranged  The patient understood this  Dr Helena Gonsalves will call the patient's wife

## 2018-03-08 NOTE — ASSESSMENT & PLAN NOTE
Increased WCC inspite of 5 doses dacogen  Had a family meeting the other day and according to oncologist, they will continue managing patient's AML; possibility of comfort measures and hospice care were also discussed in the meeting, but at this time, according to Oncology, patient and patient's family wanted to have and continue active treatment  ANC 0; as per my discussion with Hematology, patient will continue to have a low or 0 ANC  Will continue to monitor for fevers  On prophylactic antivirals  With fever again today:  Discussed with ID  She will again place the patient on IV antibiotics and order for blood cultures  From my discussion with her, patient will eventually be on prophylactic antibiotic with Levaquin, on top of the acyclovir

## 2018-03-08 NOTE — PROGRESS NOTES
Progress Note - Reji Trinh 7/24/1931, 80 y o  male MRN: [id number]    Unit/Bed#: [facility] 630-01 Encounter: [id number]    Primary Care Provider: Elijah Zhang MD   Date and time admitted to hospital: 2/20/2018  8:34 PM        Febrile neutropenia [facility])   Assessment & Plan    Patient again had a fever today  ANC 0  US neck - no abscess; large lymph node  throat swab - negative  CT head - negative  Lt back skin Bx site redness has subsided - suture removal done  Note cellulitis around suture site - improved:  Was on cefepime and then Keflex as per ID  Case discussed with ID as patient again had significant fever today  From a discussion with her, repeat blood cultures today  She will order the patient back on IV antibiotics  Skin abnormality   Assessment & Plan    · Of the back, papular rash, previously dark red in color, palpable, not blanchable; presently this is improving  · Leukemia cutis on biopsy results  · Status post biopsy with dermatologist  · As per Dermatology apply triamcinolone cream  · Improving with triamcinolone  · Patient has a small subcutaneous hematoma without signs infection on the lower left back area where the biopsy was performed, continue to apply warm compresses, at this time continues to improved, continue with clinical monitoring, no need for incision or other surgical intervention at this time        CAP (community acquired pneumonia)   Assessment & Plan    Treated successfully with clinical improvement        MARIA VICTORIA (acute kidney injury) ([facility] [address] )   Assessment & Plan    Resolved  Recheck BMP         Thrombocytopenia ([facility] [address] )   Assessment & Plan    Patient's platelet counts are below 20,000 at this point  From my discussion with Hematology, we will transfuse a unit of platelets  Recheck CBC tomorrow          Anemia   Assessment & Plan    2" AML   Cbc in AM  With patient's hemoglobin more decreased today, a unit of packed RBCs will be transfused as per recommendation by our hematologist         Lethargic   Assessment & Plan    Resolved  * AML (acute myeloblastic leukemia) ([facility] [address] )   Assessment & Plan    Increased WCC inspite of 5 doses dacogen  Had a family meeting the other day and according to oncologist, they will continue managing patient's AML; possibility of comfort measures and hospice care were also discussed in the meeting, but at this time, according to Oncology, patient and patient's family wanted to have and continue active treatment  ANC 0; as per my discussion with Hematology, patient will continue to have a low or 0 ANC  Will continue to monitor for fevers  On prophylactic antivirals  With fever again today:  Discussed with ID  She will again place the patient on IV antibiotics and order for blood cultures  From my discussion with her, patient will eventually be on prophylactic antibiotic with Levaquin, on top of the acyclovir  VTE Pharmacologic Prophylaxis:   Pharmacologic: Pharmacologic VTE Prophylaxis contraindicated due to Thrombocytopenia  Mechanical VTE Prophylaxis in Place: Yes    Patient Centered Rounds: I have performed bedside rounds with nursing staff today  Discussions with Specialists or Other Care Team Provider:  Case management  Infectious Disease doctor  Hematologist     Education and Discussions with Family / Patient:  Patient  I spoke at length to patient's wife and gave updates for today  I Answered questions and concerns  Time Spent for Care: Approximately 40 minutes  More than 50% of total time spent on counseling and coordination of care as described above  Current Length of Stay: 16 day(s)    Current Patient Status: Inpatient   Certification Statement: The patient will continue to require additional inpatient hospital stay due to Above findings and plans  Discharge Plan:  None yet today  Code Status: Level 3 - DNAR and DNI      Subjective:   Patient told me that his not feeling totally fine today    However, patient denies any shortness of breath or any pains at this point  Patient had been having low-grade fever earlier today but later on developed to a significant fever  No other complaints  Objective:     Vitals:   Temp (24hrs), Av 8 °F (37 7 °C), Min:97 9 °F (36 6 °C), Max:101 7 °F (38 7 °C)    HR:  [] 96  Resp:  [17-20] 20  BP: (119-134)/(62-70) 131/69  SpO2:  [93 %-95 %] 94 %  Body mass index is 22 08 kg/m²  Input and Output Summary (last 24 hours): Intake/Output Summary (Last 24 hours) at 18 1323  Last data filed at 18 1309   Gross per 24 hour   Intake             1380 ml   Output             1250 ml   Net              130 ml       Physical Exam:     Physical Exam   Constitutional: He is oriented to person, place, and time  No distress  HENT:   Head: Normocephalic and atraumatic  Eyes: Right eye exhibits no discharge  Left eye exhibits no discharge  No scleral icterus  Neck: No JVD present  No tracheal deviation present  Cardiovascular: Normal rate, regular rhythm and normal heart sounds  Exam reveals no gallop and no friction rub  No murmur heard  Pulmonary/Chest: Effort normal and breath sounds normal  No stridor  No respiratory distress  He has no wheezes  He has no rales  Abdominal: Soft  Bowel sounds are normal  He exhibits no distension  There is no tenderness  There is no rebound and no guarding  Musculoskeletal: He exhibits no edema, tenderness or deformity  Neurological: He is alert and oriented to person, place, and time  No cranial nerve deficit  Skin: Skin is warm  No rash noted  He is not diaphoretic  No erythema  No pallor  Psychiatric: He has a normal mood and affect  His behavior is normal  Thought content normal    Vitals reviewed        Additional Data:     Labs:      Results from last 7 days  Lab Units 18  0453   WBC Thousand/uL 24 75*   HEMOGLOBIN g/dL 7 6*   HEMATOCRIT % 22 5*   PLATELETS Thousands/uL 17*   LYMPHO PCT % 15 MONO PCT MAN % 41*   EOSINO PCT MANUAL % 0       Results from last 7 days  Lab Units 03/05/18  0544   SODIUM mmol/L 134*   POTASSIUM mmol/L 4 1   CHLORIDE mmol/L 103   CO2 mmol/L 22   BUN mg/dL 20   CREATININE mg/dL 1 07   CALCIUM mg/dL 8 3   TOTAL PROTEIN g/dL 7 6   BILIRUBIN TOTAL mg/dL 0 60   ALK PHOS U/L 100   ALT U/L 21   AST U/L 26   GLUCOSE RANDOM mg/dL 96           * I Have Reviewed All Lab Data Listed Above  * Additional Pertinent Lab Tests Reviewed: [facility] Admission Reviewed    Imaging:    Imaging Reports Reviewed Today Include:  Diagnostic imaging studies that were done on this admission  Imaging Personally Reviewed by Myself Includes:  None  Recent Cultures (last 7 days):       Results from last 7 days  Lab Units 03/01/18  1723   BLOOD CULTURE  No Growth After 5 Days  No Growth After 5 Days  Last 24 Hours Medication List:     Current Facility-Administered Medications:  acetaminophen 650 mg Oral Q6H PRN Laura Simons MD   acyclovir 400 mg Oral Q12H Jihan Chaparro MD PhD   allopurinol 100 mg Oral Daily Loren Hanley MD   baclofen 5 mg Oral BID PRN Orlando Lynch MD   benzonatate 100 mg Oral TID PRN Laura Simons MD   cefepime 2,000 mg Intravenous Q12H Anabel AldDO cherry   docusate sodium 100 mg Oral BID Laxmi Tolbert DO   guaiFENesin 600 mg Oral Q12H [facility] Laura Simons MD   hydroxyurea 1,000 mg Oral Q12H Chato Zhou MD PhD   hydrOXYzine HCL 10 mg Oral TID PRN Briana Clemons Patterson   melatonin 3 mg Oral Daily With Christie Moots Patterson   polyethylene glycol 17 g Oral Daily Laxmi Tolbert DO   triamcinolone  Topical BID [place]  Today, Patient Was Seen By: Vishal Taylor MD    ** Please Note: Dragon 360 Dictation voice to text software may have been used in the creation of this document   **

## 2018-03-08 NOTE — ASSESSMENT & PLAN NOTE
Patient again had a fever today  ANC 0  US neck - no abscess; large lymph node  throat swab - negative  CT head - negative  Lt back skin Bx site redness has subsided - suture removal done  Note cellulitis around suture site - improved:  Was on cefepime and then Keflex as per ID  Case discussed with ID as patient again had significant fever today  From a discussion with her, repeat blood cultures today  She will order the patient back on IV antibiotics

## 2018-03-08 NOTE — PROGRESS NOTES
I spoke again to patient's family including patient's daughter, son-in-law and patient's wife  I told them that patient's prognosis is poor given his leukemia, ANC of 0, having fevers  From my discussion with the oncologist, patient is hospice appropriate at this point  I again discussed with the patient's family regarding options for comfort measures and hospice care  They are going to think this over  I answered all their questions and concerns

## 2018-03-08 NOTE — ASSESSMENT & PLAN NOTE
Patient's platelet counts are below 20,000 at this point  From my discussion with Hematology, we will transfuse a unit of platelets  Recheck CBC tomorrow

## 2018-03-08 NOTE — SOCIAL WORK
Pt/family now requesting d/c to inpt rehab  Pt/family requesting referrals to 1  [facility]  [facility]  [facility]  Referrals made via [facility]

## 2018-03-08 NOTE — PROGRESS NOTES
Progress Note - Infectious Disease   Bella Orozco 80 y o  male MRN: [id number]  Unit/Bed#: [facility] 630-01 Encounter: [id number]      Impression/Recommendations:  1  Febrile neutropenia  Patient has been afebrile up until today  Chest had a fever 101  7   ANC remains low  Oncology is not anticipating recovery of his [address]  No clear source of infection appreciated  Previous blood cultures were negative  No focal complaints today  No shortness of breath, chest pain, hypoxia, abdominal pain  Patient has no central lines or indwelling catheters      -repeat blood cultures x2 sets now  -start cefepime 2 g IV q 12 hours  -monitor signs/symptoms closely  -monitor temperatures     2  B/l cervical tender lymphadenopathy  Also with c/o nasal congestion  Consider viral etiology, as noted above  No oropharyngeal erythema/exudates noted  May related to episode of low-grade fever   Neck ultrasound showing prominent lymph node with no concern for abscess  May be related to underlying malignancy      3  Newly-diagnosed AML  S/p Hydrea and Dacogen  Oncology following closely  Suspect aggressive form of disease  As there is no foreseeable recovery in his [address], will ultimately need antibiotic prophylaxis      4  Diffuse back rash  With concern for possible localized secondary cellulitis/abscess  S/p skin biopsy performed on 2/21 with results still pending  Sweets sx vs leukemic infiltrate per Dermatology   Area is significantly improved on exam   No active drainage seen on exam   No drainage required as per surgery      -antibiotic as above  -local wound care  -serial exams     5  Recent dx of CAP  S/p 7 days of IV ceftriaxone completed on 2/26  No new respiratory symptoms  Will continue to monitor      6  Thrombocytopenia  Relatively stable  C/t monitor closely while on antibiotics        Antibiotics:  Antibiotic D8  Keflex D4     Spoke with slim attending regarding plan of care  Subjective:  Patient denies new complaints  No shortness of breath or cough  No URI symptoms  No new rashes  Denies fevers, chills, or sweats  Denies nausea, vomiting, or diarrhea  Objective:  Vitals:  HR:  [] 96  Resp:  [17-20] 20  BP: (119-134)/(62-70) 131/69  SpO2:  [93 %-95 %] 94 %  Temp (24hrs), Av 5 °F (37 5 °C), Min:97 9 °F (36 6 °C), Max:100 5 °F (38 1 °C)  Current: Temperature: 100 5 °F (38 1 °C)    Physical Exam:   General:  No acute distress  Psychiatric:  Awake and alert  Pulmonary:  Normal respiratory excursion without accessory muscle use  Abdomen:  Soft, nontender  Extremities:  No edema  Skin:  Diffuse rash with scabbed over lesions  Previous biopsy site with improved induration and erythema  No drainage  Lab Results:  I have personally reviewed pertinent labs  Results from last 7 days  Lab Units 18  0544 18  0639 18  0603   SODIUM mmol/L 134* 134* 135*   POTASSIUM mmol/L 4 1 3 9 4 0   CHLORIDE mmol/L 103 104 104   CO2 mmol/L 22 23 23   ANION GAP mmol/L 9 7 8   BUN mg/dL 20 15 15   CREATININE mg/dL 1 07 1 09 1 11   EGFR ml/min/1 73sq m 63 61 60   GLUCOSE RANDOM mg/dL 96 98 117   CALCIUM mg/dL 8 3 8 0* 8 3   AST U/L 26 23  --    ALT U/L 21 23  --    ALK PHOS U/L 100 94  --    TOTAL PROTEIN g/dL 7 6 7 5  --    BILIRUBIN TOTAL mg/dL 0 60 0 68  --        Results from last 7 days  Lab Units 18  0453 18  0530 18  0926   WBC Thousand/uL 24 75* 44 40* 42 03*   HEMOGLOBIN g/dL 7 6* 8 1* 8 4*   PLATELETS Thousands/uL 17* 21* 28*       Results from last 7 days  Lab Units 18  1723   BLOOD CULTURE  No Growth After 5 Days  No Growth After 5 Days  Imaging Studies:   I have personally reviewed pertinent imaging study reports and images in PACS  EKG, Pathology, and Other Studies:   I have personally reviewed pertinent reports

## 2018-03-08 NOTE — RESTORATIVE TECHNICIAN NOTE
Restorative Specialist Mobility Note       Activity: Ambulate in room, Chair, Dangle, Stand at bedside (Educated/encouraged pt to ambulate with assistance 3-4 x's/day  Chair alarm on   Pt callbell, phone/tray within reach )     Assistive Device: Front wheel walker          [employer] BS, Restorative Technician, [employer]

## 2018-03-08 NOTE — SOCIAL WORK
JIM spoke with daughter Inocencio Cole who explained that after further family discussions they realized that [facility] wouldn't be able to provide the physical support that Mindy Jones will require upon returning to home  The family and patient feel that in-patient rehab would be a much better option  This would allow him to gain strength prior to returning to home  The family would like [facility]  JIM explained to family that the [facility] has very specific requirements and that the patient isn't guaranteed to be able to go  If there are no beds or he doesn't qualify the family would need to chose another location    A list of possible rehabs faxed to Inocencio Cole    Discussed with CM and informed that the [facility] referral was already placed

## 2018-03-08 NOTE — PROGRESS NOTES
Patient appears to be doing ok  He denies any nausea, vomiting, chest pain, or shortness of breath  He was found to have a fever today and infectious disease has already evaluated the patient  They have started the patient on cefepime  Patient is scheduled to receive 1 unit of packed red blood cells and 1 unit of platelets, per Heme-Onc  Family has asked for patient to be sent to acute rehab as opposed to home

## 2018-03-09 PROBLEM — E87.1 HYPONATREMIA: Status: ACTIVE | Noted: 2018-01-01

## 2018-03-09 PROBLEM — R32 URINARY INCONTINENCE: Status: ACTIVE | Noted: 2018-01-01

## 2018-03-09 PROBLEM — R78.81 BACTEREMIA: Status: ACTIVE | Noted: 2018-01-01

## 2018-03-09 NOTE — HOSPICE NOTE
Met with family and reviewed home hospice support  Family in agreement with taking pt home  Consents signed and faxed to [facility] office  Equipment ordered for delivery by noon on Sat  Hospital bed, NPPR mattress, half rails, OBT,  Bed pads, commode and transport wheelchair  DUSTIN [place] set up transport to home for 1145am and pt will be same day open once home  Palliative, Dr Eduardo Collins wrote scripts for family to fill today

## 2018-03-09 NOTE — SOCIAL WORK
LSW received a voicemail from daughter, Foster Garza, stating that the family would like to get him home as soon as possible, "even if there is a delay to get DME "      LSW spoke with patient about the change to hospice and patient feels that going home with the support is the best plan  LSW reached out to [address] to discuss the want to get him discharged ASAP  After a discussion, it is agreed by all parties that a discharge tomorrow 3-10 in the morning would be the best option  This would allow the hospice DME to arrive at the home and to have the hospice nurse open the case the same day as his arrival at home        Patient is very grateful for the support given by the hospital staff

## 2018-03-09 NOTE — PROGRESS NOTES
Progress note - Palliative and Supportive Care   Delia Yuen 80 y o  male [id number]    Assessment:  Newly diagnosed AML  Pancytopenia  Peripheral artery disease  MARIA VICTORIA  Maybe acquired pneumonia  History of squamous cell cancer  History of melanoma    Plan:  1  Symptom management:  Hiccups: started thorazine this am    Pain: asymptomatic; will order comfort meds    Nausea: asymptomatic; will order comfort meds    Anxiety: asymptomatic; will order comfort meds    2  Goals:  DC home with home hospice tomorrow  Scripts in the chart- Ativan, haldol, morphine and dulcolax   Equipment to be delivered today  Family has met with hospice liaison     Code Status: DNR/DNI - Level 3   Decisional apparatus:  Patient is competent on my exam today  If competence is lost, patient's substitute decision maker would default to wife by [place]  Advance Directive / Living Will / POLST:  Out of hospital DNR signed      Interval history:  Dr Maria had a discussion with the patient and expressed his concern that there are not able to provide any more treatment yesterday  Patient, his wife and his daughter were able to meet with our hospice liaison,  and myself this am  We have answered all their questions  They are hoping to have equipment delivered to their home later today  The plan is to send him home with hospice tomorrow  We will have a nurse available to to open him tomorrow      MEDICATIONS / ALLERGIES:     all current active meds have been reviewed and current meds:   Current Facility-Administered Medications   Medication Dose Route Frequency    acetaminophen (TYLENOL) tablet 650 mg  650 mg Oral Q6H PRN    acyclovir (ZOVIRAX) capsule 400 mg  400 mg Oral Q12H [address]    allopurinol (ZYLOPRIM) tablet 100 mg  100 mg Oral Daily    baclofen tablet 5 mg  5 mg Oral BID PRN    benzonatate (TESSALON PERLES) capsule 100 mg  100 mg Oral TID PRN    cefepime (MAXIPIME) 2,000 mg in dextrose 5 % 50 mL IVPB  2,000 mg Intravenous Q12H    chlorproMAZINE (THORAZINE) tablet 25 mg  25 mg Oral TID    chlorproMAZINE (THORAZINE) tablet 25 mg  25 mg Oral Q6H PRN    docusate sodium (COLACE) capsule 100 mg  100 mg Oral BID    guaiFENesin (MUCINEX) 12 hr tablet 600 mg  600 mg Oral Q12H JAMES    hydroxyurea (HYDREA) capsule 1,000 mg  1,000 mg Oral Q12H    hydrOXYzine HCL (ATARAX) tablet 10 mg  10 mg Oral TID PRN    melatonin tablet 3 mg  3 mg Oral Daily With Dinner    polyethylene glycol (MIRALAX) packet 17 g  17 g Oral Daily    triamcinolone (KENALOG) 0 1 % cream   Topical BID       Allergies   Allergen Reactions    Iodinated Diagnostic Agents      IVP dye       OBJECTIVE:    Physical Exam  Physical Exam   Constitutional: He is oriented to person, place, and time  Vital signs are normal  He appears well-developed and well-nourished  He is cooperative  Non-toxic appearance  He does not have a sickly appearance  He does not appear ill  No distress  He is not intubated  HENT:   Head: Normocephalic and atraumatic  Not macrocephalic and not microcephalic  Head is without raccoon's eyes and without Strauss's sign  Eyes: Lids are normal  Right conjunctiva is not injected  Right conjunctiva has no hemorrhage  Left conjunctiva is not injected  Left conjunctiva has no hemorrhage  No scleral icterus    +glasses   Neck: Normal range of motion  Pulmonary/Chest: Effort normal  No accessory muscle usage  No apnea, no tachypnea and no bradypnea  He is not intubated  No respiratory distress  Abdominal: He exhibits no distension  Neurological: He is alert and oriented to person, place, and time  He is not disoriented  He displays no atrophy and no tremor  GCS eye subscore is 4  GCS verbal subscore is 5  GCS motor subscore is 6  Skin: Skin is warm and dry  He is not diaphoretic  Psychiatric: He has a normal mood and affect   His speech is normal and behavior is normal  Judgment and thought content normal  Cognition and memory are normal        Lab Results:   I have personally reviewed pertinent labs  , CBC:   Lab Results   Component Value Date    WBC 12 69 (H) 03/09/2018    HGB 7 7 (L) 03/09/2018    HCT 22 3 (L) 03/09/2018    MCV 89 03/09/2018    PLT 30 (LL) 03/09/2018    MCH 30 7 03/09/2018    MCHC 34 5 03/09/2018    RDW 15 3 (H) 03/09/2018    MPV 9 7 03/09/2018    NRBC 0 03/09/2018   , CMP:   Lab Results   Component Value Date     (L) 03/09/2018    K 4 0 03/09/2018     03/09/2018    CO2 21 03/09/2018    ANIONGAP 8 03/09/2018    BUN 25 03/09/2018    CREATININE 0 88 03/09/2018    GLUCOSE 107 03/09/2018    CALCIUM 8 1 (L) 03/09/2018    EGFR 78 03/09/2018     Imaging Studies: none new   EKG, Pathology, and Other Studies: none new

## 2018-03-09 NOTE — PROGRESS NOTES
Progress Note - Infectious Disease   Delia Yuen 80 y o  male MRN: [id number]  Unit/Bed#: [facility] 630-01 Encounter: [id number]      Impression/Recommendations:  1  Febrile neutropenia  Fevers likely secondary to #2  Fever curve improving  ANC remains low  Oncology is not anticipating recovery of his [address]  No focal complaints today  No shortness of breath, chest pain, hypoxia, abdominal pain       -antibiotic plan as below  -monitor signs/symptoms closely  -monitor temperatures    2  Gram-negative jenna bacteremia  Suspect this is secondary to bacterial translocation of gut xavi in the setting of severe neutropenia  No other clear source appreciated  Patient has no urinary symptoms or respiratory symptoms  No central lines or indwelling catheters  No new skin lesions  Patient doing well on IV cefepime  Tentative discharge for tomorrow with plan for home hospice     -continue with IV cefepime   -follow-up blood culture data to guide management  Hopefully we can transition to oral antibiotic to complete 2 weeks  Awaiting I+S to determine if this is a feasible option        3  Newly-diagnosed AML  S/p Hydrea and Dacogen  Oncology following closely  Suspect aggressive form of disease  As there is no foreseeable recovery in his [address], will ultimately need antibiotic prophylaxis after completion of treatment for bacteremia  We can use Levaquin 500 mg p o  Q 24 hours for prophylaxis        4  Diffuse back rash  With concern for possible localized secondary cellulitis/abscess  S/p skin biopsy performed on 2/21 with result confirming leukemia cutis  Area is significantly improved on exam   No active drainage seen on exam   No surgical drainage required as per surgery  -local wound care  -serial exams     5  Thrombocytopenia  Relatively stable  C/t monitor closely while on antibiotics        Antibiotics:  Antibiotic D8  Cefepime D2     Spoke with slim attending regarding plan of care    Spoke with patient regarding plan of care  Subjective:  No shortness of breath or cough  No abdominal pain  No new rashes  Denies fevers, chills, or sweats  Denies nausea, vomiting, or diarrhea  Objective:  Vitals:  HR:  [] 89  Resp:  [18-22] 18  BP: (131-156)/(66-81) 131/66  SpO2:  [94 %-98 %] 97 %  Temp (24hrs), Av 6 °F (37 6 °C), Min:98 2 °F (36 8 °C), Max:102 2 °F (39 °C)  Current: Temperature: 99 5 °F (37 5 °C)    Physical Exam:   General:  No acute distress  Psychiatric:  Awake and alert  Pulmonary:  Normal respiratory excursion without accessory muscle use  Abdomen:  Soft, nontender  Extremities:  No edema  Skin:  Diffuse rash on back with scabbed over lesions  Previous biopsy site with improving induration and erythema  No drainage  Lab Results:  I have personally reviewed pertinent labs  Results from last 7 days  Lab Units 18  0507 18  0544 18  0639   SODIUM mmol/L 130* 134* 134*   POTASSIUM mmol/L 4 0 4 1 3 9   CHLORIDE mmol/L 101 103 104   CO2 mmol/L 21 22 23   ANION GAP mmol/L 8 9 7   BUN mg/dL 25 20 15   CREATININE mg/dL 0 88 1 07 1 09   EGFR ml/min/1 73sq m 78 63 61   GLUCOSE RANDOM mg/dL 107 96 98   CALCIUM mg/dL 8 1* 8 3 8 0*   AST U/L  --  26 23   ALT U/L  --  21 23   ALK PHOS U/L  --  100 94   TOTAL PROTEIN g/dL  --  7 6 7 5   BILIRUBIN TOTAL mg/dL  --  0 60 0 68       Results from last 7 days  Lab Units 18  0507 18  0453 18  0530   WBC Thousand/uL 12 69* 24 75* 44 40*   HEMOGLOBIN g/dL 7 7* 7 6* 8 1*   PLATELETS Thousands/uL 30* 17* 21*       Results from last 7 days  Lab Units 18  1340   GRAM STAIN RESULT  Gram negative rods       Imaging Studies:   I have personally reviewed pertinent imaging study reports and images in PACS  EKG, Pathology, and Other Studies:   I have personally reviewed pertinent reports

## 2018-03-09 NOTE — ASSESSMENT & PLAN NOTE
2" AML   Hemoglobin is stable  No active bleeding  Status post blood transfusion  As per discussion with patient and patient's family, patient opted for comfort measures and hospice care  Thus from a discussion with them, no further blood workup

## 2018-03-09 NOTE — PROGRESS NOTES
Approximately one hour into blood transfusion pt developed a fever of 100 7  Infusion stopped and [facility] notified  Blood bank notified

## 2018-03-09 NOTE — ASSESSMENT & PLAN NOTE
· Of the back, papular rash, previously dark red in color, palpable, not blanchable; presently this has been improving    · Leukemia cutis on biopsy results  · Status post biopsy with dermatologist  · As per Dermatology apply triamcinolone cream  · Improving with triamcinolone  · Patient has a small subcutaneous hematoma without signs infection on the lower left back area where the biopsy was performed, continue to apply warm compresses, at this time continues to improved, continue with clinical monitoring, no need for incision or other surgical intervention at this time

## 2018-03-09 NOTE — CASE MANAGEMENT
Continued Stay Review    Thank you,  [facility] in the [facility] by Praneeth Shah for 2017  Network Utilization Review Department  Phone: [phone]; Fax [phone]  ATTENTION: The Network Utilization Review Department is now centralized for our 7 Facilities  Make a note that we have a new phone and fax numbers for our Department  Please call with any questions or concerns to [phone] and carefully follow the prompts so that you are directed to the right person  All voicemails are confidential  Fax any determinations, approvals, denials, and requests for initial or continue stay review clinical to [phone]  Due to HIGH CALL volume, it would be easier if you could please send faxed requests to expedite your requests and in part, help us provide discharge notifications faster      Date: 3/9/2018  Vital Signs: /66 (BP Location: Left arm)   Pulse 89   Temp 99 5 °F (37 5 °C) (Oral)   Resp 18   Ht 5' 10" (1 778 m)   Wt 69 8 kg (153 lb 14 1 oz)   SpO2 97%   BMI 22 08 kg/m²     Medications:   Scheduled Meds:   Current Facility-Administered Medications:  acetaminophen 650 mg Oral Q6H PRN Pastora Brown MD    acyclovir 400 mg Oral Q12H Gavino Andrade MD PhD    allopurinol 100 mg Oral Daily Gisella Anna MD    baclofen 5 mg Oral BID PRN Orlando Lynch MD    benzonatate 100 mg Oral TID PRN Pastora Brown MD    bisacodyl 10 mg Rectal Daily PRN [facility], DO    cefepime 2,000 mg Intravenous Q12H Anabel Aldea, DO Last Rate: 2,000 mg (03/09/18 1214)   chlorproMAZINE 25 mg Oral TID Leta Peraza PA-C    chlorproMAZINE 25 mg Oral Q6H PRN [facility], DO    docusate sodium 100 mg Oral BID Hetul Tolbert, DO    guaiFENesin 600 mg Oral Q12H [address] Pastora Brown MD    haloperidol 0 5 mg Oral Q2H PRN [facility], DO    hydroxyurea 1,000 mg Oral Q12H Margaret Bose MD PhD    hydrOXYzine HCL 10 mg Oral TID PRN Jeffery Costello [place]    LORazepam 0 5 mg Oral Q2H PRN Rosamaria Fly, DO    melatonin 3 mg Oral Daily With Fernie Sensing [place]    morphine sulfate (concentrate) 5 mg Oral Q2H PRN Rosamraia Fly, DO    polyethylene glycol 17 g Oral Daily Hetul Tolbert, DO    triamcinolone  Topical BID Collins Boron  Continuous Infusions:  NS @ 100 l/hr -  D/c'd on 3/9 @ 09:39  PRN Meds:   Acetaminophen po 3/8 x 3     Baclofen po 3/8 x11    Benzonatate po 3/9 x 1  bisacodyl    chlorproMAZINE    haloperidol    hydrOXYzine HCL    LORazepam    morphine sulfate (concentrate)   Platlets  1 unit on 3/8  PRBC's 1 unit on 3/8    Nursing orders - VS q 3 - Fall precautions - neutropenic precautions - Up with assistance - Diet regular House     Abnormal Labs/Diagnostic Results:      Ref Range & Units 3/9/18 0507 3/8/18 0453 3/7/18 0530 3/6/18 0926 3/5/18 0544   WBC 4 31 - 10 16 Thousand/uL 12 69   24 75   44 40CM   42  03CM   50  77CM     RBC 3 88 - 5 62 Million/uL 2 51   2 51   2 62   2 70   2 62     Hemoglobin 12 0 - 17 0 g/dL 7 7   7 6   8 1   8 4   7 9     Hematocrit 36 5 - 49 3 % 22 3   22 5   23 7   24 2   23 5     MCV 82 - 98 fL 89  90  91  90  90    MCH 26 8 - 34 3 pg 30 7  30 3  30 9  31 1  30 2    MCHC 31 4 - 37 4 g/dL 34 5  33 8  34 2  34 7  33 6    RDW 11 6 - 15 1 % 15 3   14 9  14 9  14 8  14 5    MPV 8 9 - 12 7 fL 9 7  9 4  9 1  9 6  9 4    Platelets 446 - 503 Thousands/uL 30   17CM   21CM   28CM   39CM     nRBC /100 WBCs 0  0  0  0  0              Ref Range & Units 3/9/18 0507 3/5/18 0544 3/4/18 0639 3/2/18 0603 3/1/18 0643   Sodium 136 - 145 mmol/L 130   134   134   135   135     Potassium 3 5 - 5 3 mmol/L 4 0  4 1  3 9  4 0  4 3    Chloride 100 - 108 mmol/L 101  103  104  104  104    CO2 21 - 32 mmol/L 21  22  23  23  23    Anion Gap 4 - 13 mmol/L 8  9  7  8  8    BUN 5 - 25 mg/dL 25  20  15  15  16    Creatinine 0 60 - 1 30 mg/dL 0 88  1 07CM  1 09CM  1  11CM  1 13CM    Glucose 65 - 140 mg/dL 107  96CM  98CM  117CM  97CM Calcium 8 3 - 10 1 mg/dL 8 1   8 3  8 0   8 3  8 0     eGFR ml/min/1 73sq m 78  63  61  61  59                  Age/Sex: 80 y o  male     Assessment/Plan:   Progress note 3/8      Febrile neutropenia ([address] )   Assessment & Plan     Patient again had a fever today  ANC 0  US neck - no abscess; large lymph node  throat swab - negative  CT head - negative  Lt back skin Bx site redness has subsided - suture removal done  Note cellulitis around suture site - improved:  Was on cefepime and then Keflex as per ID  Case discussed with ID as patient again had significant fever today  From a discussion with her, repeat blood cultures today  She will order the patient back on IV antibiotics             Skin abnormality   Assessment & Plan     · Of the back, papular rash, previously dark red in color, palpable, not blanchable; presently this is improving  · Leukemia cutis on biopsy results  · Status post biopsy with dermatologist  · As per Dermatology apply triamcinolone cream  · Improving with triamcinolone  · Patient has a small subcutaneous hematoma without signs infection on the lower left back area where the biopsy was performed, continue to apply warm compresses, at this time continues to improved, continue with clinical monitoring, no need for incision or other surgical intervention at this time          CAP (community acquired pneumonia)   Assessment & Plan     Treated successfully with clinical improvement          MARIA VICTORIA (acute kidney injury) ([address] )   Assessment & Plan     Resolved  Recheck BMP           Thrombocytopenia ([address] )   Assessment & Plan     Patient's platelet counts are below 20,000 at this point  From my discussion with Hematology, we will transfuse a unit of platelets      Recheck CBC tomorrow           Anemia   Assessment & Plan     2" AML   Cbc in AM  With patient's hemoglobin more decreased today, a unit of packed RBCs will be transfused as per recommendation by our hematologist           Latrice Assessment & Plan     Resolved           * AML (acute myeloblastic leukemia) ([facility] [address] )   Assessment & Plan     Increased WCC inspite of 5 doses dacogen  Had a family meeting the other day and according to oncologist, they will continue managing patient's AML; possibility of comfort measures and hospice care were also discussed in the meeting, but at this time, according to Oncology, patient and patient's family wanted to have and continue active treatment  ANC 0; as per my discussion with Hematology, patient will continue to have a low or 0 ANC  Will continue to monitor for fevers  On prophylactic antivirals  With fever again today:  Discussed with ID  She will again place the patient on IV antibiotics and order for blood cultures  From my discussion with her, patient will eventually be on prophylactic antibiotic with Levaquin, on top of the acyclovir             Oncology note 3/8   ASSESSMENT/PLAN:  Jaden James is a 80 y o  male with:    1) AML:  - received hydrea before chemo  - Last day 5/5 Decagen on 3/3;  tollerating well  - continue allopurinol 100 mg   , uric acid 4 6  - continue acyclovir 400 mg po bid     - spiking fever today / neutropenia fever,   infection to be proven otherwise  - ID on board, switched back to iv abx    - cultures pending     - goal of care was again discussed this afternoon due to the new fever  Options will be continue supportive care with transfusion and abx or hospice  Pt / family will consider       - if decided to follow with oncology as outpt, appointment was made for 3/15 with dr Mechelle Reyes     2) pancytopenia   - anemia : 2nd to cancer  Transfuse on 3/4 1 u    - platelet  : 1u on 3/2, 1u on 3/5  - close monitor, keep Hb > 8 and plt > 20k        3) back lump : likely hematoma  - warm patch  - Hb is stable  Infectious Disease note 3/9  Impression/Recommendations:  1  Febrile neutropenia  Fevers likely secondary to #2  Fever curve improving   ANC remains [facility]   Oncology is not anticipating recovery of his ANC   No focal complaints today   No shortness of breath, chest pain, hypoxia, abdominal pain       -antibiotic plan as below  -monitor signs/symptoms closely  -monitor temperatures     2   Gram-negative jenna bacteremia  Suspect this is secondary to bacterial translocation of gut xavi in the setting of severe neutropenia  No other clear source appreciated  Patient has no urinary symptoms or respiratory symptoms  No central lines or indwelling catheters  No new skin lesions  Patient doing well on IV cefepime  Tentative discharge for tomorrow with plan for home hospice      -continue with IV cefepime   -follow-up blood culture data to guide management  Hopefully we can transition to oral antibiotic to complete 2 weeks  Awaiting I+S to determine if this is a feasible option        3  Newly-diagnosed AML  S/p Hydrea and Dacogen  Oncology following closely  [facility] aggressive form of disease   As there is no foreseeable recovery in his [address], will ultimately need antibiotic prophylaxis after completion of treatment for bacteremia  We can use Levaquin 500 mg p o  Q 24 hours for prophylaxis        4  Diffuse back rash  With concern for possible localized secondary cellulitis/abscess  S/p skin biopsy performed on 2/21 with result confirming leukemia cutis  Area is significantly improved on exam   No active drainage seen on exam   No surgical drainage required as per surgery      -local wound care  -serial exams     5  Thrombocytopenia  Relatively stable  C/t monitor closely while on antibiotics          Discharge Plan:  Home with hospice on 3/10  Carolee Villanueva RN Registered Nurse Signed   Hospice Note Date of Service: 3/9/2018  1:58 PM             Met with family and reviewed home hospice support  Family in agreement with taking pt home  Consents signed and faxed to A office  Equipment ordered for delivery by noon on Sat   Hospital bed, NPPR mattress, half rails, OBT,  Bed pads, commode and transport wheelchair  DUSTIN Orlando set up transport to home for 1145am and pt will be same day open once home  Palliative, Dr Kristine Nagy wrote scripts for family to fill today

## 2018-03-09 NOTE — PHYSICAL THERAPY NOTE
Physical Therapy Cancellation Note- noted that pt is opting for comfort care and hospice and will discharge home tomorrow  Will d/c PT    Zahira Mcclelland, PT

## 2018-03-09 NOTE — PROGRESS NOTES
03/09/18 1600   Clinical Encounter Type   Visited With Patient and family together   Routine Visit Introduction

## 2018-03-09 NOTE — PROGRESS NOTES
Progress Note - Dedra Umanzor 7/24/1931, 80 y o  male MRN: [id number]    Unit/Bed#: [facility] 630-01 Encounter: [id number]    Primary Care Provider: Quirino Zhu MD   Date and time admitted to hospital: 2/20/2018  8:34 PM        Urinary incontinence   Assessment & Plan    · From my discussion with patient and patient's family, they are okay with condom catheter  · From my discussion with patient and patient's family, no Hankins catheter  Bacteremia   Assessment & Plan    · Positive for gram-negative on blood cultures  · This was discussed at length with patient and patient's family at bedside  To wait or not to wait for the results of the blood cultures and sensitivities prior to going home  It was explained to them that if he opted to go home tomorrow, we will be guessing what antibiotic to prescribe him  From my discussion with Infectious Disease doctor, she would prefer possibly to wait for the results of the cultures and sensitivities here, prior to discharge, so that she will know what antibiotic to prescribe her at home  According to ID, if he in cyst of going home and blood culture results are not yet back, she wants the patient to be prescribed with Levaquin at 500 mg once a day to complete 14 day course  Results of the cultures will be followed up by their service and/or by the visiting staff from hospice  Patient patient's family still undecided this point  From a discussion with the patient, he does not want further antibiotics after this course  Febrile neutropenia (HCC)   Assessment & Plan    Continue present IV antibiotic  Infectious disease doctor on board  Follow results of the cultures  Skin abnormality   Assessment & Plan    · Of the back, papular rash, previously dark red in color, palpable, not blanchable; presently this has been improving    · Leukemia cutis on biopsy results  · Status post biopsy with dermatologist  · As per Dermatology apply triamcinolone cream  · Improving with triamcinolone  · Patient has a small subcutaneous hematoma without signs infection on the lower left back area where the biopsy was performed, continue to apply warm compresses, at this time continues to improved, continue with clinical monitoring, no need for incision or other surgical intervention at this time        CAP (community acquired pneumonia)   Assessment & Plan    Treated successfully with clinical improvement        MARIA VICTORIA (acute kidney injury) ([facility] [address] )   Assessment & Plan    Resolved          Thrombocytopenia ([facility] [address] )   Assessment & Plan    Patient's platelet counts improved with the platelet transfusion          Anemia   Assessment & Plan    2" AML   Hemoglobin is stable  No active bleeding  Status post blood transfusion  As per discussion with patient and patient's family, patient opted for comfort measures and hospice care  Thus from a discussion with them, no further blood workup  Lethargic   Assessment & Plan    Resolved  * AML (acute myeloblastic leukemia) ([facility] [address] )   Assessment & Plan    Status post 5 doses dacogen  Patient and patient's family, opted for comfort measures and hospice care  This will be provided at home  Patient and patient's family wants patient to go home tomorrow for hospice  VTE Pharmacologic Prophylaxis:   Pharmacologic: Pharmacologic VTE Prophylaxis contraindicated due to End of life comfort/hospice care  Mechanical VTE Prophylaxis in Place: Yes    Patient Centered Rounds: I have performed bedside rounds with nursing staff today  Discussions with Specialists or Other Care Team Provider:  Case management  Infectious Disease doctor  Oncologist   Palliative care staff  Education and Discussions with Family / Patient:  Patient  Patient's family at bedside  I gave updates for today  I answered all questions and concerns  Time Spent for Care: 45 minutes    More than 50% of total time spent on counseling and coordination of care as described above  Current Length of Stay: 17 day(s)    Current Patient Status: Inpatient   Certification Statement: The patient will continue to require additional inpatient hospital stay due to Above findings and plans  Discharge Plan:     Code Status: Level 3 - DNAR and DNI      Subjective:   Presently, patient is having he cups  Otherwise, no other complaints  Patient denies any nausea or vomiting or any pains or shortness of breath  Objective:     Vitals:   Temp (24hrs), Av 6 °F (37 6 °C), Min:98 2 °F (36 8 °C), Max:102 2 °F (39 °C)    HR:  [] 89  Resp:  [18-22] 18  BP: (131-156)/(66-81) 131/66  SpO2:  [94 %-98 %] 97 %  Body mass index is 22 08 kg/m²  Input and Output Summary (last 24 hours): Intake/Output Summary (Last 24 hours) at 18 1433  Last data filed at 18 1233   Gross per 24 hour   Intake          1025 42 ml   Output             1100 ml   Net           -74 58 ml       Physical Exam:     Physical Exam   Constitutional: He is oriented to person, place, and time  No distress  HENT:   Head: Normocephalic and atraumatic  Eyes: Right eye exhibits no discharge  Left eye exhibits no discharge  No scleral icterus  Neck: No JVD present  No tracheal deviation present  Cardiovascular: Normal rate, regular rhythm and normal heart sounds  Exam reveals no gallop and no friction rub  No murmur heard  Pulmonary/Chest: Effort normal and breath sounds normal  No stridor  No respiratory distress  He has no wheezes  He has no rales  Abdominal: Soft  Bowel sounds are normal  He exhibits no distension  There is no tenderness  There is no rebound and no guarding  Musculoskeletal: He exhibits no edema, tenderness or deformity  Neurological: He is alert and oriented to person, place, and time  No cranial nerve deficit  Skin: Skin is warm  No rash noted  He is not diaphoretic  No erythema  No pallor  Psychiatric: He has a normal mood and affect   His behavior is normal  Thought content normal    Vitals reviewed  Additional Data:     Labs:      Results from last 7 days  Lab Units 03/09/18  0507   WBC Thousand/uL 12 69*   HEMOGLOBIN g/dL 7 7*   HEMATOCRIT % 22 3*   PLATELETS Thousands/uL 30*   LYMPHO PCT % 18   MONO PCT MAN % 43*   EOSINO PCT MANUAL % 0       Results from last 7 days  Lab Units 03/09/18  0507 03/05/18  0544   SODIUM mmol/L 130* 134*   POTASSIUM mmol/L 4 0 4 1   CHLORIDE mmol/L 101 103   CO2 mmol/L 21 22   BUN mg/dL 25 20   CREATININE mg/dL 0 88 1 07   CALCIUM mg/dL 8 1* 8 3   TOTAL PROTEIN g/dL  --  7 6   BILIRUBIN TOTAL mg/dL  --  0 60   ALK PHOS U/L  --  100   ALT U/L  --  21   AST U/L  --  26   GLUCOSE RANDOM mg/dL 107 96           * I Have Reviewed All Lab Data Listed Above  * Additional Pertinent Lab Tests Reviewed: [address] Admission Reviewed    Imaging:    Imaging Reports Reviewed Today Include:  Diagnostic imaging studies that were done this admission  Imaging Personally Reviewed by Myself Includes:  None      Recent Cultures (last 7 days):       Results from last 7 days  Lab Units 03/08/18  1340   GRAM STAIN RESULT  Gram negative rods       Last 24 Hours Medication List:     Current Facility-Administered Medications:  acetaminophen 650 mg Oral Q6H PRN Jojo Urbina MD    acyclovir 400 mg Oral Q12H Moraima Wayne MD PhD    allopurinol 100 mg Oral Daily Rhonda Mckay MD    baclofen 5 mg Oral BID PRN Orlando Lynch MD    benzonatate 100 mg Oral TID PRN Jojo Urbina MD    bisacodyl 10 mg Rectal Daily PRN [facility], DO    cefepime 2,000 mg Intravenous Q12H Anabel Aldea, DO Last Rate: 2,000 mg (03/09/18 1214)   chlorproMAZINE 25 mg Oral TID Leta Peraza PA-C    chlorproMAZINE 25 mg Oral Q6H PRN [facility], DO    docusate sodium 100 mg Oral BID Hetul Tolbert, DO    guaiFENesin 600 mg Oral Q12H [address] Jojo Urbina MD    haloperidol 0 5 mg Oral Q2H PRN [facility], DO    hydroxyurea 1,000 mg Oral Q12H Moe Gutierrez MD PhD    hydrOXYzine HCL 10 mg Oral TID PRN Yaneli Grayson [place]    LORazepam 0 5 mg Oral Q2H PRN [facility], DO    melatonin 3 mg Oral Daily With [employer] [place]    morphine sulfate (concentrate) 5 mg Oral Q2H PRN [facility], DO    polyethylene glycol 17 g Oral Daily Hetul Tomasz, DO    triamcinolone  Topical BID Chencho Sane  Today, Patient Was Seen By: Diana Cortes MD    ** Please Note: Dragon 360 Dictation voice to text software may have been used in the creation of this document   **

## 2018-03-09 NOTE — SOCIAL WORK
CM spoke with pt's dtr who advised pt/family requesting d/c plan to home with [facility]  Referral made via [address]  Anticipate d/c to home tomorrow once DME is delivered to residence  Scripts sent to [address] to be filled prior to pt's d/c  BLS auth request submitted to [facility]  Tentative transport arranged for d/c tomorrow via [facility] at 11:45am  Will confirm time with family once Addie Abbasi is received

## 2018-03-09 NOTE — PROGRESS NOTES
Patient: Catie Koch  Patient MRN: [id number]  Service date: 2/25/2018  Attending Physician:       CHIEF COMPLAIN  No chief complaint on file  Heme / Oncology history:  Catie Koch is a 80 y o  male  With newly diagnosed AML  Received hydroxyurea a 1000 milligrams twice a day from 2/23-2/25  On  R [address] now, currently day 5/5  Pt reported doing well  Developed fever this afternoon  No clear source of infection  Abx was switched back to iv  PAST MEDICAL HISTORY:   has no past medical history on file  PAST SURGICAL HISTORY:   has a past surgical history that includes Hernia repair (12/09/2003); Colonoscopy (N/A, 2012); Tonsillectomy (N/A); pr phleb veins - extrem - to 20 (Left, 1/15/2016); pr endovenous laser, 1st vein (Left, 1/15/2016); Colonoscopy (01/2013); and Cystoscopy  CURRENT MEDICATIONS  Scheduled Meds:    Current Facility-Administered Medications:  acetaminophen 650 mg Oral Q6H PRN Italo Mendoza MD    acyclovir 400 mg Oral Q12H Sb Prakash MD PhD    allopurinol 100 mg Oral Daily Miguelangel Luis MD    benzonatate 100 mg Oral TID PRN Italo Mendoza MD    cefTRIAXone 1,000 mg Intravenous Q24H Italo Mendoza MD Last Rate: Stopped (02/24/18 1610)   guaiFENesin 600 mg Oral Q12H [address] Italo Mendoza MD    hydroxyurea 1,000 mg Oral Q12H Carlenestdread Augustin MD    hydrOXYzine HCL 25 mg Oral Q6H PRN Italo Mendoza MD    melatonin 3 mg Oral HS Miguelangel Luis MD    sodium bicarbonate infusion 50 mL/hr Intravenous Continuous Miguelangel Luis MD Last Rate: Stopped (02/24/18 1225)   sodium chloride 50 mL/hr Intravenous Continuous Miguelangel Luis MD Last Rate: Stopped (02/24/18 1225)   triamcinolone  Topical BID [facility]        Continuous Infusions:    sodium bicarbonate infusion 50 mL/hr Last Rate: Stopped (02/24/18 1225)   sodium chloride 50 mL/hr Last Rate: Stopped (02/24/18 1225)     PRN Meds:   acetaminophen    benzonatate    hydrOXYzine HCL    SOCIAL HISTORY:   reports that he has never smoked  He has never used smokeless tobacco  He reports that he drinks alcohol  His drug history is not on file  FAMILY HISTORY:  family history includes Coronary artery disease in his father and mother; Heart disease in his father; Stroke in his mother  ALLERGIES:  is allergic to iodinated diagnostic agents  REVIEW OF SYSTEMS:  Please note that a 14-point review of systems was performed to include Constitutional, HEENT, Respiratory, CVS, GI, , Musculoskeletal, Integumentary, Neurologic, Rheumatologic, Endocrinologic, Psychiatric, Lymphatic, and Hematologic/Oncologic systems were reviewed and are negative unless otherwise stated in HPI  Positive and negative findings pertinent to this evaluation are incorporated into the history of present illness  PHYSICAL EXAMINATION:  Vital Signs: Temp:  [97 5 °F (36 4 °C)-99 °F (37 2 °C)] 98 6 °F (37 °C)  HR:  [76-98] 76  Resp:  [18-21] 21  BP: (104-128)/(58-71) 104/64  Body mass index is 23 19 kg/m²  Body surface area is 1 91 meters squared  Constitutional: Alert and oriented  HEENT: Anicteric, PERRLA  Chest: Decreased breathing sound bilaterally, No wheezes/rales/rhonchi  CVS: Regular rhythm  Normal rate  Abdomen: Soft, nontender, nondistended  Bowel sounds positive X 4  No palpable organomegaly  Back: diffuse erythematous rash  Left flank mass 6 cm in diameter  Extremities: No cyanosis/clubbing/edema  Integumentary: No obvious rashes or bruises  Musculoskeletal: No obvious bony or joint deformities  Psychiatric: Appropriate affect and mood  Lymph Node Survey: No palpable preauricular, submandibular, cervical, supraclavicular, axillary, epitrochlear or inguinal lymphadenopathy      LABS:    Results from last 7 days  Lab Units 02/25/18  0604 02/24/18  2155 02/24/18  0521 02/23/18  1706   WBC Thousand/uL 56 11* 90 45* 106 65* 123 30*   HEMATOCRIT % 22 8* 23 4* 18 7* 20 5*   PLATELETS Thousands/uL 25* 27* 31* 31*   MONO PCT MAN % 62*  -- 58* 69*       Results from last 7 days  Lab Units 02/25/18  0604 02/24/18  0521 02/23/18  0604   SODIUM mmol/L 135* 139 134*   POTASSIUM mmol/L 4 1 4 0 3 9   CHLORIDE mmol/L 106 107 106   CO2 mmol/L 21 22 21   BUN mg/dL 18 12 12       Results from last 7 days  Lab Units 02/25/18  0604 02/24/18  0521 02/23/18  0604  02/20/18  0606 02/19/18  1642   PHOSPHORUS mg/dL 3 8 3 2 1 6*  < > 2 7  --    MAGNESIUM mg/dL 2 2 2 1 2 0  < > 2 0  --    ALBUMIN g/dL  --   --   --   --  2 5* 3 2*   BILIRUBIN TOTAL mg/dL  --   --   --   --  0 66 0 75   ALK PHOS U/L  --   --   --   --  66 79   ALT U/L  --   --   --   --  24 25   AST U/L  --   --   --   --  37 40   < > = values in this interval not displayed      Results from last 7 days  Lab Units 02/20/18  0607 02/19/18  2310   INR  1 39* 1 37*   PTT seconds 41* 46*           Invalid input(s): TNI,  PCT      Results from last 7 days  Lab Units 02/20/18  0606   LD U/L 486*             IMAGING  IR bone marrow biopsy/aspiration   Final Result   Impression:      Technically successful image guided bone marrow aspiration and core biopsy         Workstation performed: [id number]         :      PROBLEM LIST:    Patient Active Problem List   Diagnosis    Allergic rhinitis    Atopic dermatitis    Balance problems    Enlarged prostate without lower urinary tract symptoms (luts)    Erectile dysfunction    Folliculitis    Hematuria, gross    Hyperglycemia    Melanoma in situ ([address] )    Peripheral arterial disease (HCC)    Peripheral neuropathy    Peripheral vascular disease (HCC)    Lethargic    Myalgia    Fever    Monocytosis    Generalized skin papules    SIRS (systemic inflammatory response syndrome) (HCC)    Lethargy    Anemia    Thrombocytopenia (HCC)    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([address] )    MARIA VICTORIA (acute kidney injury) ([address] )    CAP (community acquired pneumonia)    AML (acute myeloblastic leukemia) ([facility] )    Skin abnormality       ASSESSMENT/PLAN:  Kole Bruner is a 80 y o  male with:    1) AML:  - received hydrea before chemo  - Last day 5/5 Decagen on 3/3;  tollerating well  - continue allopurinol 100 mg   , uric acid 4 6  - continue acyclovir 400 mg po bid    - spiking fever today / neutropenia fever,   infection to be proven otherwise  - ID on board, switched back to iv abx    - cultures pending    - goal of care was again discussed this afternoon due to the new fever  Options will be continue supportive care with transfusion and abx or hospice  Pt / family will consider      - if decided to follow with oncology as outpt, appointment was made for 3/15 with dr Ивна Barajas     2) pancytopenia   - anemia : 2nd to cancer  Transfuse on 3/4 1 u    - platelet  : 1u on 3/2, 1u on 3/5  - close monitor, keep Hb > 8 and plt > 20k        3) back lump : likely hematoma  - warm patch  - Hb is stable             Tae Fields MD PhD  Hematology / Oncology

## 2018-03-09 NOTE — ASSESSMENT & PLAN NOTE
Status post 5 doses dacogen  Patient and patient's family, opted for comfort measures and hospice care  This will be provided at home  Patient and patient's family wants patient to go home tomorrow for hospice

## 2018-03-09 NOTE — SOCIAL WORK
CM informed by [facility] that since pt was signed onto hospice, [facility] would not auth BLS transport and pt would be under Medicare   Pt's family, bedside RN, and Monique-liaison [facility] aware of transport time for tomorrow via [facility] at 11:45am

## 2018-03-09 NOTE — PROGRESS NOTES
Heels and buttocks erythema blanchable hydraguard applied , repositioned, offload buttocks and heels

## 2018-03-09 NOTE — RESTORATIVE TECHNICIAN NOTE
Restorative Specialist Mobility Note       Activity: Other (Comment) (Educated/encouraged pt to ambulate with assistance 3-4 x's/day, pt refused 2* not feeling up to it  Bed alarm on  Pt callbell, phone/tray within reach )     Repositioned:  Other (Comment) (Sat pt upright in bed for lunch )          [employer] BS, Restorative Technician, [employer]

## 2018-03-09 NOTE — PROGRESS NOTES
SLIM paged regarding Lactic Acid level of 2 1  Also to request a PRN for hiccups lasting for a few hours off and on all day  Pt and family requesting medication be given for hiccups  [place] from [facility] stated she would review chart  Will watch for orders

## 2018-03-09 NOTE — ASSESSMENT & PLAN NOTE
· Positive for gram-negative on blood cultures  · This was discussed at length with patient and patient's family at bedside  To wait or not to wait for the results of the blood cultures and sensitivities prior to going home  It was explained to them that if he opted to go home tomorrow, we will be guessing what antibiotic to prescribe him  From my discussion with Infectious Disease doctor, she would prefer possibly to wait for the results of the cultures and sensitivities here, prior to discharge, so that she will know what antibiotic to prescribe her at home  According to ID, if he in cyst of going home and blood culture results are not yet back, she wants the patient to be prescribed with Levaquin at 500 mg once a day to complete 14 day course  Results of the cultures will be followed up by their service and/or by the visiting staff from hospice  Patient patient's family still undecided this point  From a discussion with the patient, he does not want further antibiotics after this course

## 2018-03-09 NOTE — PROGRESS NOTES
03/09/18 1656 Rashida Huerta Involvement Patient active with [employer]   Spiritual Beliefs/Perceptions   Concept of God Accepting   Relationship with God Close   Spiritual Strengths Kristal; Prayer   Support Systems Spouse/significant other;Children   Coping Responses   Patient Coping Accepting   Family Coping Accepting   Plan of Care   Comments  propides a safe space for the pt and family to express thoughts  Family articulates hope not based on preferred future outcomes     Assessment Completed by: Unit visit

## 2018-03-09 NOTE — PROGRESS NOTES
During a linen change RN found pt to have a large raised, red and hot area on his right flank  This was previously a scab from a rash but just tonight developed reddness and warmth in the area  SLIM WILBER Lauren at bedside to assess

## 2018-03-09 NOTE — OCCUPATIONAL THERAPY NOTE
Occupational Therapy Discharge Note  Pt w/ plan for hospice, no further acute OT needs identified  D/C OT orders at this time     Sushil Lopez MS, OTR/L

## 2018-03-10 NOTE — DISCHARGE SUMMARY
[facility]    Patient Information: Jannie Harley 80 y o  male MRN: [id number]  Unit/Bed#: [facility] 630-01 Encounter: [id number]    Discharging Physician / Practitioner: Ismael Moyer MD  PCP: Abdelrahman Malone MD  Admission Date: 2/20/2018  Discharge Date: 03/10/18    Reason for Admission:  PATIENT WAS A TRANSFER HERE FOR CHEMOTHERAPY  Discharge Diagnoses:   1  Febrile neutropenia  2  Bacteremia with gram-negative rods on blood culture  3   Urinary incontinence  4   Leukemia cutis  5   Community-acquired pneumonia, resolved  6   Acute kidney injury, resolved  7   Thrombocytopenia, improving, status post platelet transfusion  8   Acute myeloblastic leukemia (AML)  Status post chemotherapy  9   Anemia, secondary to AML, stable  Status post blood transfusion  No active bleeding  10   Lethargy, resolved  Consultations During Hospital Stay:  · Oncologist/hematologist   · Infectious Disease doctor  · General surgeon  · Palliative care/hospice care team     Procedures Performed:     · Please see hospital course below and diagnosis above  Significant Findings:     · Please see hospital course below and diagnosis above  Incidental Findings:   · None  Test Results Pending at Discharge (will require follow up): · Final results of the blood cultures and sensitivities  This should be followed up by the palliative care/hospice care team as per discussion with them  Outpatient Tests Requested:  · None  Complications:  None  Hospital Course: Jannie Harley is a 80 y o  male patient who originally presented to the hospital on 2/20/2018 due to acute myeloblastic leukemia and for chemotherapy  Patient was a transfer from [facility]  Here, patient underwent chemotherapy  Patient was found to have febrile neutropenia here with bacteremia with gram-negative rods  Patient was given IV antibiotic    We do not have the specific organism yet or the sensitivities for the blood cultures  Thus options were given to the patient including waiting for the results of blood cultures and sensitivities here  However, since patient patient's family opted for comfort measures and hospice care, they opted to go home today and not wait for the results of the cultures and sensitivities  This was discussed with infectious disease doctor as well as the hospice care/palliative care doctor  According to my discussion with Dr Shilpi Oglesby, yesterday, about this case, if no final results of the cultures and sensitivities prior to patient being discharged on, we should prescribe the patient with Levaquin at 500 mg once a day to complete 14 days of treatment  Thus patient will need 12 more days of the antibiotic as he had 2 days of the IV antibiotic here  Dr Shilpi Olgesby also mentioned to me prophylactic doses of Levaquin and Acyclovir, indefinitely  However, from my discussion with the patient himself, he does not want any further antibiotics except for the treatment of his bacteremia at this point  Thus no further prophylactic antibiotics will be prescribed after the course of Levaquin for the treatment of his bacteremia  As far as the results of the blood cultures and sensitivities, I discussed this with hospice care team, Dr Raman Osorio, and they will follow up the final results of the cultures and sensitivities  It is important to note, that I explained to the patient and patient's family that the choice of antibiotic that we're going to prescribe him at home, may not be the right antibiotic as we do not have the final results of the cultures and sensitivities yet  They are aware of this, but patient and family still insists of going home today for hospice care  On this admission, patient was also found to have some skin abnormality with rash  Biopsy was done which revealed leukemia cutis    Triamcinolone cream was applied here and there is improvement of this rashes  Patient also had community-acquired pneumonia which was treated successfully with clinical improvement, although patient still has occasional cough with phlegm  Thus Mucinex will be prescribed  On this admission, patient had thrombocytopenia and anemia likely due to the leukemia and chemotherapy, patient was given packed RBCs and platelets  They have improved with the transfusions  Patient has some urinary incontinence and this was discussed with patient patient's family yesterday  They opted for condom catheter  Thus I instructed the patient's nurse, to place condom catheter prior to discharge  Patient had some acute kidney injury here, likely prerenal, which improved with IV fluids  Acute kidney injury had resolved  Initially, patient had lethargy, which had resolved eventually  I spoke to the Infectious Disease doctor on call for today and she is okay with discharge of this patient  I also spoke to Dr Lucia Abarca, and she is okay with discharge of this patient today  Condition at Discharge: Terminal      Discharge Day Visit / Exam:     Subjective:   Presently, patient tells me that his doing fine  Patient denies any more he cups  Patient actually denies any symptoms  When asked, patient denies any shortness of breath, pains, nausea or vomiting  As per my discussion with the patient, he does not want to wait for the final results of the blood cultures and sensitivities, he just wants to go home now for comfort measures and hospice care  Patient's wife at bedside agrees with this  I gave updates for today to the patient patient's wife  I answered questions and concerns        Vitals: Blood Pressure: 106/60 (03/10/18 0645)  Pulse: 95 (03/10/18 0645)  Temperature: 99 8 °F (37 7 °C) (03/10/18 0645)  Temp Source: Oral (03/10/18 0645)  Respirations: 20 (03/10/18 0645)  Height: 5' 10" (177 8 cm) (03/03/18 1832)  Weight - Scale: 69 8 kg (153 lb 14 1 oz) (03/03/18 1832)  SpO2: 94 % (03/10/18 Megan Portillo  Exam:   Physical Exam   Constitutional: He is oriented to person, place, and time  No distress  HENT:   Head: Normocephalic and atraumatic  Eyes: Right eye exhibits no discharge  Left eye exhibits no discharge  No scleral icterus  Neck: No JVD present  No tracheal deviation present  Cardiovascular: Normal rate, regular rhythm and normal heart sounds  Exam reveals no gallop and no friction rub  No murmur heard  Pulmonary/Chest: Effort normal and breath sounds normal  No stridor  No respiratory distress  He has no wheezes  He has no rales  Abdominal: Soft  Bowel sounds are normal  He exhibits no distension  There is no tenderness  There is no rebound and no guarding  Musculoskeletal: He exhibits no edema, tenderness or deformity  Neurological: He is alert and oriented to person, place, and time  No cranial nerve deficit  Skin: Skin is warm  No rash noted  He is not diaphoretic  No erythema  No pallor  Psychiatric: He has a normal mood and affect  His behavior is normal  Thought content normal    Vitals reviewed  Discharge instructions/Information to patient and family:   See after visit summary for information provided to patient and family  Provisions for Follow-Up Care:  See after visit summary for information related to follow-up care and any pertinent home health orders  Disposition: Home with home hospice care  It is important to note, that PT OT was recommended by our physical therapists, thus this will be provided at home  Planned Readmission:  None  Discharge Statement:  I spent 50 minutes discharging the patient  This time was spent on the day of discharge  I had direct contact with the patient on the day of discharge  Greater than 50% of the total time was spent examining patient, answering all patient questions, arranging and discussing plan of care with patient as well as directly providing post-discharge instructions    Additional time then spent on discharge activities  Discharge Medications:  See after visit summary for reconciled discharge medications provided to patient and family  ** Please Note: Dragon 360 Dictation voice to text software may have been used in the creation of this document   **

## 2018-03-10 NOTE — NURSING NOTE
walked patient's spouse to [facility] to obtain medication prescriptions, supplies given to family per Dr Elmore Commander & family request, wife took to gift shop to purchase snacks hx of hypoglycemia reviewed discharge instructions verbally understood

## 2018-03-10 NOTE — PLAN OF CARE
DISCHARGE PLANNING - CARE MANAGEMENT     Discharge to post-acute care or home with appropriate resources Adequate for Discharge        INFECTION - ADULT     Absence or prevention of progression during hospitalization Adequate for Discharge     Absence of fever/infection during neutropenic period Adequate for Discharge        Nutrition/Hydration-ADULT     Nutrient/Hydration intake appropriate for improving, restoring or maintaining nutritional needs Adequate for Discharge        Potential for Falls     Patient will remain free of falls Adequate for Discharge        Prexisting or High Potential for Compromised Skin Integrity     Skin integrity is maintained or improved Adequate for Discharge        SKIN/TISSUE INTEGRITY - ADULT     Skin integrity remains intact Adequate for Discharge

## 2018-03-15 ENCOUNTER — DOCUMENTATION (OUTPATIENT)
Dept: FAMILY MEDICINE CLINIC | Facility: CLINIC | Age: 83
End: 2018-03-15

## 2018-03-15 ENCOUNTER — TELEPHONE (OUTPATIENT)
Dept: FAMILY MEDICINE CLINIC | Facility: CLINIC | Age: 83
End: 2018-03-15

## 2018-03-15 NOTE — PROGRESS NOTES
home brought a death certificate for the patient  He was pronounced dead on the  at 10:45 p m  Dara Soulier Death certificate signed with diagnosis of acute myelogenous leukemia

## 2018-05-02 DIAGNOSIS — R73.9 HYPERGLYCEMIA: ICD-10-CM

## 2018-05-02 DIAGNOSIS — N52.9 MALE ERECTILE DYSFUNCTION: ICD-10-CM

## 2018-05-02 DIAGNOSIS — D03.9 MELANOMA IN SITU (HCC): ICD-10-CM

## 2018-05-02 DIAGNOSIS — N40.0 ENLARGED PROSTATE WITHOUT LOWER URINARY TRACT SYMPTOMS (LUTS): ICD-10-CM

## 2018-05-02 DIAGNOSIS — I73.9 PERIPHERAL VASCULAR DISEASE (HCC): ICD-10-CM

## 2018-09-14 NOTE — PROGRESS NOTES
Patient: Dasha Mederos  Patient MRN: [id number]  Service date: 2/25/2018  Attending Physician:       CHIEF COMPLAIN  No chief complaint on file  Heme / Oncology history:  Dasha Mederos is a 80 y o  male  With newly diagnosed AML  on hydroxyurea a 1000 milligrams twice a day from 2/23  planning for 3 days,  then give him Dacogen  Pt reported doing well  No fever, chills  No bleeding  Nurse reported a back mass at the [address] [place] [address] site for biopsy on left back  Pt reported some mild tenderness  HISTORY OF PRESENT ILLNESS:  Dasha Mederos is a 80 y o  male who presents      PAST MEDICAL HISTORY:   has no past medical history on file  PAST SURGICAL HISTORY:   has a past surgical history that includes Hernia repair (12/09/2003); Colonoscopy (N/A, 2012); Tonsillectomy (N/A); pr phleb veins - extrem - to 20 (Left, 1/15/2016); pr endovenous laser, 1st vein (Left, 1/15/2016); Colonoscopy (01/2013); and Cystoscopy  CURRENT MEDICATIONS  Scheduled Meds:    Current Facility-Administered Medications:  acetaminophen 650 mg Oral Q6H PRN Carlos Nayak MD    acyclovir 400 mg Oral Q12H Yvonne Easton MD PhD    allopurinol 100 mg Oral Daily Abran Grande MD    benzonatate 100 mg Oral TID PRN Carlos Nayak MD    cefTRIAXone 1,000 mg Intravenous Q24H Carlos Nayak MD Last Rate: Stopped (02/24/18 1610)   guaiFENesin 600 mg Oral Q12H [address] Carlos Nayak MD    hydroxyurea 1,000 mg Oral Q12H [address], MD    hydrOXYzine HCL 25 mg Oral Q6H PRN Carlos Nayak MD    melatonin 3 mg Oral HS Abran Grande MD    sodium bicarbonate infusion 50 mL/hr Intravenous Continuous Abran Grande MD Last Rate: Stopped (02/24/18 1225)   sodium chloride 50 mL/hr Intravenous Continuous Abran Grande MD Last Rate: Stopped (02/24/18 1225)   triamcinolone  Topical BID Pallavi Heater        Continuous Infusions:    sodium bicarbonate infusion 50 mL/hr Last Rate: Stopped (02/24/18 1225)   sodium chloride 50 mL/hr Last Rate: Stopped (02/24/18 1225)     PRN Meds:   acetaminophen    benzonatate    hydrOXYzine HCL    SOCIAL HISTORY:   reports that he has never smoked  He has never used smokeless tobacco  He reports that he drinks alcohol  His drug history is not on file  FAMILY HISTORY:  family history includes Coronary artery disease in his father and mother; Heart disease in his father; Stroke in his mother  ALLERGIES:  is allergic to iodinated diagnostic agents  REVIEW OF SYSTEMS:  Please note that a 14-point review of systems was performed to include Constitutional, HEENT, Respiratory, CVS, GI, , Musculoskeletal, Integumentary, Neurologic, Rheumatologic, Endocrinologic, Psychiatric, Lymphatic, and Hematologic/Oncologic systems were reviewed and are negative unless otherwise stated in HPI  Positive and negative findings pertinent to this evaluation are incorporated into the history of present illness  PHYSICAL EXAMINATION:  Vital Signs: Temp:  [97 5 °F (36 4 °C)-99 °F (37 2 °C)] 98 6 °F (37 °C)  HR:  [76-98] 76  Resp:  [18-21] 21  BP: (104-128)/(58-71) 104/64  Body mass index is 23 19 kg/m²  Body surface area is 1 91 meters squared  Constitutional: Alert and oriented  HEENT: Anicteric, PERRLA  Chest: Decreased breathing sound bilaterally, No wheezes/rales/rhonchi  CVS: Regular rhythm  Normal rate  Abdomen: Soft, nontender, nondistended  Bowel sounds positive X 4  No palpable organomegaly  Back: diffuse erythematous rash  Left flank mass 6 cm in diameter  Extremities: No cyanosis/clubbing/edema  Integumentary: No obvious rashes or bruises  Musculoskeletal: No obvious bony or joint deformities  Psychiatric: Appropriate affect and mood  Lymph Node Survey: No palpable preauricular, submandibular, cervical, supraclavicular, axillary, epitrochlear or inguinal lymphadenopathy      LABS:    Results from last 7 days  Lab Units 02/25/18  0604 02/24/18  2155 02/24/18  0521 02/23/18  1706   WBC Thousand/uL 56 11* 90 45* 106 65* 123 30*   HEMATOCRIT % 22 8* 23 4* 18 7* 20 5*   PLATELETS Thousands/uL 25* 27* 31* 31*   MONO PCT MAN % 62*  --  58* 69*       Results from last 7 days  Lab Units 02/25/18  0604 02/24/18  0521 02/23/18  0604   SODIUM mmol/L 135* 139 134*   POTASSIUM mmol/L 4 1 4 0 3 9   CHLORIDE mmol/L 106 107 106   CO2 mmol/L 21 22 21   BUN mg/dL 18 12 12       Results from last 7 days  Lab Units 02/25/18  0604 02/24/18  0521 02/23/18  0604  02/20/18  0606 02/19/18  1642   PHOSPHORUS mg/dL 3 8 3 2 1 6*  < > 2 7  --    MAGNESIUM mg/dL 2 2 2 1 2 0  < > 2 0  --    ALBUMIN g/dL  --   --   --   --  2 5* 3 2*   BILIRUBIN TOTAL mg/dL  --   --   --   --  0 66 0 75   ALK PHOS U/L  --   --   --   --  66 79   ALT U/L  --   --   --   --  24 25   AST U/L  --   --   --   --  37 40   < > = values in this interval not displayed      Results from last 7 days  Lab Units 02/20/18  0607 02/19/18  2310   INR  1 39* 1 37*   PTT seconds 41* 46*           Invalid input(s): TNI,  PCT      Results from last 7 days  Lab Units 02/20/18  0606   LD U/L 486*             IMAGING  IR bone marrow biopsy/aspiration   Final Result   Impression:      Technically successful image guided bone marrow aspiration and core biopsy         Workstation performed: [id number]         :      PROBLEM LIST:    Patient Active Problem List   Diagnosis    Allergic rhinitis    Atopic dermatitis    Balance problems    Enlarged prostate without lower urinary tract symptoms (luts)    Erectile dysfunction    Folliculitis    Hematuria, gross    Hyperglycemia    Melanoma in situ ([facility] [address] )    Peripheral arterial disease (HCC)    Peripheral neuropathy    Peripheral vascular disease (HCC)    Lethargic    Myalgia    Fever    Monocytosis    Generalized skin papules    SIRS (systemic inflammatory response syndrome) (HCC)    Lethargy    Anemia    Thrombocytopenia (HCC)    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([address] )    MARIA VICTORIA (acute kidney injury) ([address] )    CAP (community acquired pneumonia)    AML (acute myeloblastic leukemia) ([facility] [address] )    Skin abnormality       ASSESSMENT/PLAN:  Etta Parra is a 80 y o  male with:    1) AML:  - continue hydrea 1 g bid D#2    - continue allopurinol 100 mg  will check uric acid in Am    - add acyclovir 400 mg po bid     2) anemia : 2nd to cancer    - platelet stable in 26B, likely not from DIC   - 2 u ordered by primary team    - close monitor, keep Hb > 8 and plt > 20k        3) back lump : likely hematoma  - warm patch  - Hb is stable     - low threshold for Ultrasound for further evaluation    Kellie Michelle MD PhD  Hematology / Oncology 14-Sep-2018 20:58

## 2021-12-27 NOTE — PROGRESS NOTES
Pharmacy Note - Admission Medication History    Pertinent Provider Information: filled the following with [facility]: Carvedilol/furosemide 7/12 for 30 day supply and lisinopril/spironolactone 11/9 for 30 day supply. Called [facility] to use central search function but it is not active at this time.        ______________________________________________________________________    Prior To Admission (PTA) med list completed and updated in EMR.       PTA Med List   Medication Sig Last Dose     carvedilol (COREG) 12.5 MG tablet Take 1 tablet (12.5 mg) by mouth 2 times daily (with meals) 12/26/2021 at Unknown time     furosemide (LASIX) 40 MG tablet Take 1 tablet (40 mg) by mouth daily      lisinopril (ZESTRIL) 20 MG tablet Take 1 tablet (20 mg) by mouth daily 12/26/2021 at Unknown time     spironolactone (ALDACTONE) 25 MG tablet Take 1 tablet (25 mg) by mouth daily 12/26/2021 at Unknown time       Information source(s): Patient, Patient's pharmacy and no recent CareEverywhere visits  Method of interview communication: in-person    Summary of Changes to PTA Med List  New: none  Discontinued: norco-old script      Patient was asked about OTC/herbal products specifically.  PTA med list reflects this.    In the past week, patient estimated taking medication this percent of the time:  greater than 90%.    Allergies were reviewed, assessed, and updated with the patient.      Patient does not use any multi-dose medications prior to admission.    The information provided in this note is only as accurate as the sources available at the time of the update(s).    Thank you for the opportunity to participate in the care of this patient.    Amauri Martinez [facility]  12/26/2021 10:38 PM     Progress Note - Infectious Disease   Chago Sarmiento 80 y o  male MRN: [id number]  Unit/Bed#: [facility] 630-01 Encounter: [id number]      Impression/Recommendations:  1  Febrile neutropenia  Pt had a temp of 100 4 on 03/01   No other fevers since   ANC still low  No clear source of infection appreciated  Blood cultures remain negative  Patient did complain of bilateral anterior cervical lymphadenopathy so consideration for viral URI  Also consideration for underlying malignancy causing low-grade temperature  Does have mild abscess formation/cellulitis of one of his back lesions, which is now markedly improved  As patient has remained afebrile and blood cultures are negative monitor off cefepime      -discontinue cefepime  -start cephalexin as stated below  -monitor signs/symptoms closely     2  B/l cervical tender lymphadenopathy  Also with c/o nasal congestion  Consider viral etiology, as noted above  No oropharyngeal erythema/exudates noted  May related to episode of low-grade fever  Neck ultrasound showing prominent lymph node with no concern for abscess  May be related to underlying malignancy      3  Newly-diagnosed AML  S/p Hydrea with good response  Currently receiving Dacogen  Oncology following closely      4  Diffuse back rash  With concern for possible localized secondary cellulitis/abscess  S/p skin biopsy performed on 2/21 with results still pending  Sweets sx vs leukemic infiltrate per Dermatology  Area is significantly improved on exam   No active drainage seen on exam   No drainage required as per surgery      -transition to oral Keflex  Give 5 more days through 3/10  -local wound care  -serial exams     5  Recent dx of CAP  S/p 7 days of IV ceftriaxone completed on 2/26  No new respiratory symptoms  Will continue to monitor      6  Thrombocytopenia  Relatively stable  C/t monitor closely while on antibiotics          Antibiotics:  cefepime D5     Spoke with slim attending regarding plan of care     Subjective:  Patient is more upset today about his underlying malignancy  He denies fevers or chills  Through pain of little bit prove  Denies any back pain  Denies nausea, vomiting, or diarrhea  No abdominal pain  No shortness of breath  Objective:  Vitals:  HR:  [84-97] 84  Resp:  [18] 18  BP: (109-128)/(56-70) 111/56  SpO2:  [93 %-94 %] 94 %  Temp (24hrs), Av 7 °F (37 6 °C), Min:99 4 °F (37 4 °C), Max:99 9 °F (37 7 °C)  Current: Temperature: 99 4 °F (37 4 °C)    Physical Exam:   General:  No acute distress  Psychiatric:  Awake and alert  HEENT:  Mildly improved bilateral anterior cervical lymphadenopathy  Pulmonary:  Normal respiratory excursion without accessory muscle use  Abdomen:  Soft, nontender  Extremities:  No edema  Skin:  Diffuse rash with scabbed over lesions  Previous biopsy site with significantly improved induration and erythema  No drainage  Lab Results:  I have personally reviewed pertinent labs  Results from last 7 days  Lab Units 18  0544 18  0639 18  0603   SODIUM mmol/L 134* 134* 135*   POTASSIUM mmol/L 4 1 3 9 4 0   CHLORIDE mmol/L 103 104 104   CO2 mmol/L 22 23 23   ANION GAP mmol/L 9 7 8   BUN mg/dL 20 15 15   CREATININE mg/dL 1 07 1 09 1 11   EGFR ml/min/1 73sq m 63 61 60   GLUCOSE RANDOM mg/dL 96 98 117   CALCIUM mg/dL 8 3 8 0* 8 3   AST U/L 26 23  --    ALT U/L 21 23  --    ALK PHOS U/L 100 94  --    TOTAL PROTEIN g/dL 7 6 7 5  --    BILIRUBIN TOTAL mg/dL 0 60 0 68  --        Results from last 7 days  Lab Units 18  0544 18  0846 18  1136   WBC Thousand/uL 50 77* 42 27* 22 46*   HEMOGLOBIN g/dL 7 9* 7 3* 7 8*   PLATELETS Thousands/uL 39* 45* 52*       Results from last 7 days  Lab Units 18  1723   BLOOD CULTURE  No Growth at 72 hrs  No Growth at 72 hrs  Imaging Studies:   I have personally reviewed pertinent imaging study reports and images in PACS      EKG, Pathology, and Other Studies:   I have personally reviewed pertinent reports

## 2022-07-29 NOTE — PROGRESS NOTES
Progress note - Palliative and Supportive Care   Aki Mole 80 y o  male [id number]    Assessment:  Newly diagnosed AML  Thrombocytopenia  Anemia  Peripheral artery disease  MARIA VITCORIA  Maybe acquired pneumonia  History of squamous cell cancer  History of melanoma     Plan:  1  Symptom management: asymptomatic      2  Goals:   family for a meeting se up for tomorrow at 1pm     Code Status: DNR/DNI - Level 3   Decisional apparatus:  Patient is competent on my exam today  If competence is lost, patient's substitute decision maker would default to wife by [place]  Advance Directive / Living Will / POLST:        Interval history:  Given patient's WBC count is elevating, Hematology-Oncology, is suggesting that his disease is very aggressive  They are hoping have a family meeting to discuss future options  Patient tells me that he thinks he is almost out of options and he is very disappointed  He thinks his family has gone behind his back to schedule a family meeting and he cant blame them  He does not feel he is giving them enough information and they are confused  He denies any nausea, vomiting, chest pain, shortness of breath      MEDICATIONS / ALLERGIES:    all current active meds have been reviewed and current meds:   Current Facility-Administered Medications   Medication Dose Route Frequency    acetaminophen (TYLENOL) tablet 650 mg  650 mg Oral Q6H PRN    acyclovir (ZOVIRAX) capsule 400 mg  400 mg Oral Q12H [address]    allopurinol (ZYLOPRIM) tablet 100 mg  100 mg Oral Daily    baclofen tablet 5 mg  5 mg Oral BID    benzonatate (TESSALON PERLES) capsule 100 mg  100 mg Oral TID PRN    [START ON 3/6/2018] cephalexin (KEFLEX) capsule 500 mg  500 mg Oral Q12H [address]    docusate sodium (COLACE) capsule 100 mg  100 mg Oral BID    guaiFENesin (MUCINEX) 12 hr tablet 600 mg  600 mg Oral Q12H JAMES    hydrOXYzine HCL (ATARAX) tablet 10 mg  10 mg Oral TID PRN    melatonin tablet 3 mg  3 mg Oral Daily With [employer] polyethylene glycol (MIRALAX) packet 17 g  17 g Oral Daily    triamcinolone (KENALOG) 0 1 % cream   Topical BID       Allergies   Allergen Reactions    Iodinated Diagnostic Agents      IVP dye       OBJECTIVE:    Physical Exam  Physical Exam   Constitutional: He appears well-developed and well-nourished  He is cooperative  He does not have a sickly appearance  He does not appear ill  No distress  He is not intubated  +patient is sitting in his chair again   HENT:   Head: Not macrocephalic and not microcephalic  Head is without raccoon's eyes and without Strauss's sign  +wearing glasses   Eyes: Lids are normal  Right conjunctiva is not injected  Right conjunctiva has no hemorrhage  Left conjunctiva is not injected  Left conjunctiva has no hemorrhage  No scleral icterus  Neck: Normal range of motion  Pulmonary/Chest: Effort normal  No accessory muscle usage  No apnea, no tachypnea and no bradypnea  He is not intubated  No respiratory distress  Abdominal: He exhibits no distension  Neurological: He is alert  No cranial nerve deficit  GCS eye subscore is 4  GCS verbal subscore is 5  GCS motor subscore is 6  Skin: Skin is warm and dry  He is not diaphoretic  Psychiatric: His speech is normal and behavior is normal  Thought content normal  He exhibits a depressed mood  He exhibits abnormal recent memory  +patient is disappointed and sad       Lab Results:   I have personally reviewed pertinent labs  , CBC:   Lab Results   Component Value Date    WBC 50 77 (HH) 03/05/2018    HGB 7 9 (L) 03/05/2018    HCT 23 5 (L) 03/05/2018    MCV 90 03/05/2018    PLT 39 (LL) 03/05/2018    MCH 30 2 03/05/2018    MCHC 33 6 03/05/2018    RDW 14 5 03/05/2018    MPV 9 4 03/05/2018    NRBC 0 03/05/2018   , CMP:   Lab Results   Component Value Date     (L) 03/05/2018    K 4 1 03/05/2018     03/05/2018    CO2 22 03/05/2018    ANIONGAP 9 03/05/2018    BUN 20 03/05/2018    CREATININE 1 07 03/05/2018    GLUCOSE 96 03/05/2018    CALCIUM 8 3 03/05/2018    AST 26 03/05/2018    ALT 21 03/05/2018    ALKPHOS 100 03/05/2018    PROT 7 6 03/05/2018    BILITOT 0 60 03/05/2018    EGFR 63 03/05/2018     Imaging Studies: none new today  EKG, Pathology, and Other Studies: reviewed Unable to assess